# Patient Record
Sex: FEMALE | Race: WHITE | NOT HISPANIC OR LATINO
[De-identification: names, ages, dates, MRNs, and addresses within clinical notes are randomized per-mention and may not be internally consistent; named-entity substitution may affect disease eponyms.]

---

## 2017-05-02 ENCOUNTER — APPOINTMENT (OUTPATIENT)
Dept: VASCULAR SURGERY | Facility: CLINIC | Age: 72
End: 2017-05-02

## 2017-05-02 VITALS
TEMPERATURE: 97.6 F | DIASTOLIC BLOOD PRESSURE: 72 MMHG | HEART RATE: 73 BPM | HEIGHT: 58 IN | BODY MASS INDEX: 31.49 KG/M2 | WEIGHT: 150 LBS | SYSTOLIC BLOOD PRESSURE: 104 MMHG

## 2017-05-02 RX ORDER — ATORVASTATIN CALCIUM 80 MG/1
TABLET, FILM COATED ORAL
Refills: 0 | Status: ACTIVE | COMMUNITY

## 2017-05-02 RX ORDER — MONTELUKAST SODIUM 10 MG/1
10 TABLET, FILM COATED ORAL
Refills: 0 | Status: ACTIVE | COMMUNITY

## 2017-05-02 RX ORDER — MULTIVITAMIN
TABLET ORAL
Refills: 0 | Status: ACTIVE | COMMUNITY

## 2017-05-02 RX ORDER — FLUTICASONE PROPIONATE AND SALMETEROL 50; 100 UG/1; UG/1
100-50 POWDER RESPIRATORY (INHALATION)
Refills: 0 | Status: ACTIVE | COMMUNITY

## 2017-08-11 ENCOUNTER — APPOINTMENT (OUTPATIENT)
Dept: ORTHOPEDIC SURGERY | Facility: CLINIC | Age: 72
End: 2017-08-11
Payer: MEDICARE

## 2017-08-11 VITALS
HEART RATE: 58 BPM | BODY MASS INDEX: 36.44 KG/M2 | HEIGHT: 56 IN | DIASTOLIC BLOOD PRESSURE: 83 MMHG | SYSTOLIC BLOOD PRESSURE: 143 MMHG | WEIGHT: 162 LBS

## 2017-08-11 DIAGNOSIS — Z82.62 FAMILY HISTORY OF OSTEOPOROSIS: ICD-10-CM

## 2017-08-11 DIAGNOSIS — Z87.09 PERSONAL HISTORY OF OTHER DISEASES OF THE RESPIRATORY SYSTEM: ICD-10-CM

## 2017-08-11 DIAGNOSIS — Z86.39 PERSONAL HISTORY OF OTHER ENDOCRINE, NUTRITIONAL AND METABOLIC DISEASE: ICD-10-CM

## 2017-08-11 DIAGNOSIS — M17.0 BILATERAL PRIMARY OSTEOARTHRITIS OF KNEE: ICD-10-CM

## 2017-08-11 DIAGNOSIS — Z82.61 FAMILY HISTORY OF ARTHRITIS: ICD-10-CM

## 2017-08-11 PROCEDURE — 99203 OFFICE O/P NEW LOW 30 MIN: CPT

## 2017-08-11 PROCEDURE — 73562 X-RAY EXAM OF KNEE 3: CPT | Mod: LT

## 2017-08-11 PROCEDURE — 72170 X-RAY EXAM OF PELVIS: CPT | Mod: 59

## 2017-08-11 RX ORDER — ESOMEPRAZOLE MAGNESIUM 5 MG/1
GRANULE, DELAYED RELEASE ORAL
Refills: 0 | Status: DISCONTINUED | COMMUNITY
End: 2017-08-11

## 2017-08-11 RX ORDER — BUTALBITAL, ACETAMINOPHEN AND CAFFEINE 300; 50; 40 MG/1; MG/1; MG/1
50-300-40 CAPSULE ORAL
Refills: 0 | Status: ACTIVE | COMMUNITY

## 2017-08-15 ENCOUNTER — OUTPATIENT (OUTPATIENT)
Dept: OUTPATIENT SERVICES | Facility: HOSPITAL | Age: 72
LOS: 1 days | End: 2017-08-15
Payer: MEDICARE

## 2017-08-15 VITALS
OXYGEN SATURATION: 98 % | SYSTOLIC BLOOD PRESSURE: 140 MMHG | TEMPERATURE: 98 F | WEIGHT: 162.26 LBS | HEART RATE: 66 BPM | DIASTOLIC BLOOD PRESSURE: 82 MMHG | HEIGHT: 58 IN | RESPIRATION RATE: 16 BRPM

## 2017-08-15 DIAGNOSIS — Z90.12 ACQUIRED ABSENCE OF LEFT BREAST AND NIPPLE: Chronic | ICD-10-CM

## 2017-08-15 DIAGNOSIS — Z87.19 PERSONAL HISTORY OF OTHER DISEASES OF THE DIGESTIVE SYSTEM: Chronic | ICD-10-CM

## 2017-08-15 DIAGNOSIS — Z98.890 OTHER SPECIFIED POSTPROCEDURAL STATES: Chronic | ICD-10-CM

## 2017-08-15 DIAGNOSIS — M17.11 UNILATERAL PRIMARY OSTEOARTHRITIS, RIGHT KNEE: ICD-10-CM

## 2017-08-15 DIAGNOSIS — C50.912 MALIGNANT NEOPLASM OF UNSPECIFIED SITE OF LEFT FEMALE BREAST: ICD-10-CM

## 2017-08-15 DIAGNOSIS — Z01.818 ENCOUNTER FOR OTHER PREPROCEDURAL EXAMINATION: ICD-10-CM

## 2017-08-15 LAB
ALBUMIN SERPL ELPH-MCNC: 3.9 G/DL — SIGNIFICANT CHANGE UP (ref 3.3–5)
ALP SERPL-CCNC: 73 U/L — SIGNIFICANT CHANGE UP (ref 30–120)
ALT FLD-CCNC: 29 U/L DA — SIGNIFICANT CHANGE UP (ref 10–60)
ANION GAP SERPL CALC-SCNC: 10 MMOL/L — SIGNIFICANT CHANGE UP (ref 5–17)
APTT BLD: 27.6 SEC — SIGNIFICANT CHANGE UP (ref 27.5–37.4)
AST SERPL-CCNC: 28 U/L — SIGNIFICANT CHANGE UP (ref 10–40)
BILIRUB SERPL-MCNC: 0.4 MG/DL — SIGNIFICANT CHANGE UP (ref 0.2–1.2)
BLD GP AB SCN SERPL QL: SIGNIFICANT CHANGE UP
BUN SERPL-MCNC: 16 MG/DL — SIGNIFICANT CHANGE UP (ref 7–23)
CALCIUM SERPL-MCNC: 10.2 MG/DL — SIGNIFICANT CHANGE UP (ref 8.4–10.5)
CHLORIDE SERPL-SCNC: 110 MMOL/L — HIGH (ref 96–108)
CO2 SERPL-SCNC: 25 MMOL/L — SIGNIFICANT CHANGE UP (ref 22–31)
CREAT SERPL-MCNC: 0.84 MG/DL — SIGNIFICANT CHANGE UP (ref 0.5–1.3)
GLUCOSE SERPL-MCNC: 100 MG/DL — HIGH (ref 70–99)
HCT VFR BLD CALC: 45.4 % — HIGH (ref 34.5–45)
HGB BLD-MCNC: 14.7 G/DL — SIGNIFICANT CHANGE UP (ref 11.5–15.5)
INR BLD: 1.02 RATIO — SIGNIFICANT CHANGE UP (ref 0.88–1.16)
MCHC RBC-ENTMCNC: 30.6 PG — SIGNIFICANT CHANGE UP (ref 27–34)
MCHC RBC-ENTMCNC: 32.3 GM/DL — SIGNIFICANT CHANGE UP (ref 32–36)
MCV RBC AUTO: 94.8 FL — SIGNIFICANT CHANGE UP (ref 80–100)
MRSA PCR RESULT.: SIGNIFICANT CHANGE UP
PLATELET # BLD AUTO: 175 K/UL — SIGNIFICANT CHANGE UP (ref 150–400)
POTASSIUM SERPL-MCNC: 4.2 MMOL/L — SIGNIFICANT CHANGE UP (ref 3.5–5.3)
POTASSIUM SERPL-SCNC: 4.2 MMOL/L — SIGNIFICANT CHANGE UP (ref 3.5–5.3)
PROT SERPL-MCNC: 7.2 G/DL — SIGNIFICANT CHANGE UP (ref 6–8.3)
PROTHROM AB SERPL-ACNC: 11.1 SEC — SIGNIFICANT CHANGE UP (ref 9.8–12.7)
RBC # BLD: 4.8 M/UL — SIGNIFICANT CHANGE UP (ref 3.8–5.2)
RBC # FLD: 11.9 % — SIGNIFICANT CHANGE UP (ref 10.3–14.5)
S AUREUS DNA NOSE QL NAA+PROBE: SIGNIFICANT CHANGE UP
SODIUM SERPL-SCNC: 145 MMOL/L — SIGNIFICANT CHANGE UP (ref 135–145)
WBC # BLD: 6.1 K/UL — SIGNIFICANT CHANGE UP (ref 3.8–10.5)
WBC # FLD AUTO: 6.1 K/UL — SIGNIFICANT CHANGE UP (ref 3.8–10.5)

## 2017-08-15 PROCEDURE — 85730 THROMBOPLASTIN TIME PARTIAL: CPT

## 2017-08-15 PROCEDURE — 93010 ELECTROCARDIOGRAM REPORT: CPT | Mod: NC

## 2017-08-15 PROCEDURE — 80053 COMPREHEN METABOLIC PANEL: CPT

## 2017-08-15 PROCEDURE — 93005 ELECTROCARDIOGRAM TRACING: CPT

## 2017-08-15 PROCEDURE — 86850 RBC ANTIBODY SCREEN: CPT

## 2017-08-15 PROCEDURE — G0463: CPT

## 2017-08-15 PROCEDURE — 86901 BLOOD TYPING SEROLOGIC RH(D): CPT

## 2017-08-15 PROCEDURE — 87641 MR-STAPH DNA AMP PROBE: CPT

## 2017-08-15 PROCEDURE — 87640 STAPH A DNA AMP PROBE: CPT

## 2017-08-15 PROCEDURE — 86900 BLOOD TYPING SEROLOGIC ABO: CPT

## 2017-08-15 PROCEDURE — 85027 COMPLETE CBC AUTOMATED: CPT

## 2017-08-15 PROCEDURE — 85610 PROTHROMBIN TIME: CPT

## 2017-08-15 NOTE — H&P PST ADULT - PSH
H/O hiatal hernia  surgery approx 2002  S/P carpal tunnel release  right  S/P lumpectomy, left breast  approx 2008

## 2017-08-15 NOTE — H&P PST ADULT - PMH
Essential hypertension    Gastroesophageal reflux disease, esophagitis presence not specified    Hyperlipidemia, unspecified hyperlipidemia type    Malignant neoplasm of left female breast, unspecified estrogen receptor status, unspecified site of breast  2008- treated with lumpectomy, chemotherapy and radiation therapy.  Nonintractable migraine, unspecified migraine type    Obesity, Class I, BMI 30-34.9    Primary osteoarthritis of right knee    Uncomplicated asthma, unspecified asthma severity  mild-controlled w/ medication- has never been in ER or hospitalized for asthma. never had a severe exacerbation.

## 2017-08-15 NOTE — H&P PST ADULT - ASSESSMENT
71yo female patient scheduled for surgery on 8/23/17. She will obtain medical clearance from her PMD. She will be NPO as per Anesthesia and will take Pantoprazole, Atenolol, Topiramate (and if needed Butalbital) on AM of surgery with a sip of water. She will use Advair. All other pre-op instructions reviewed with patient.   Denies any metal allergies.

## 2017-08-15 NOTE — H&P PST ADULT - PROBLEM SELECTOR PROBLEM 2
Malignant neoplasm of left female breast, unspecified estrogen receptor status, unspecified site of breast

## 2017-08-15 NOTE — H&P PST ADULT - FAMILY HISTORY
Father  Still living? No  Family history of myocardial infarction at age less than 60, Age at diagnosis: Age Unknown     Sibling  Still living? No  Family history of acute myocardial infarction, Age at diagnosis: Age Unknown

## 2017-08-15 NOTE — H&P PST ADULT - HISTORY OF PRESENT ILLNESS
73yo female patient with approximately 3yr history of bilateral knee pain, worse on the right. She has had Cortisone injections with temporary relief, each time with diminishing improvement. She rates the pain at 5/10 when at rest, 7/10 when ambulatory, sometimes as high as 10/10. She is taking Meloxicam or Ibuprofen prn for pain with relief.   She was told that TKR is recommended and the plan is to do staged bilateral TKR. She presents today for PSTs.

## 2017-08-22 RX ORDER — DOCUSATE SODIUM 100 MG
100 CAPSULE ORAL THREE TIMES A DAY
Qty: 0 | Refills: 0 | Status: DISCONTINUED | OUTPATIENT
Start: 2017-08-23 | End: 2017-08-28

## 2017-08-22 RX ORDER — SODIUM CHLORIDE 9 MG/ML
1000 INJECTION, SOLUTION INTRAVENOUS
Qty: 0 | Refills: 0 | Status: DISCONTINUED | OUTPATIENT
Start: 2017-08-23 | End: 2017-08-24

## 2017-08-22 RX ORDER — ONDANSETRON 8 MG/1
4 TABLET, FILM COATED ORAL EVERY 6 HOURS
Qty: 0 | Refills: 0 | Status: DISCONTINUED | OUTPATIENT
Start: 2017-08-23 | End: 2017-08-28

## 2017-08-22 RX ORDER — POLYETHYLENE GLYCOL 3350 17 G/17G
17 POWDER, FOR SOLUTION ORAL DAILY
Qty: 0 | Refills: 0 | Status: DISCONTINUED | OUTPATIENT
Start: 2017-08-23 | End: 2017-08-28

## 2017-08-22 RX ORDER — PANTOPRAZOLE SODIUM 20 MG/1
40 TABLET, DELAYED RELEASE ORAL
Qty: 0 | Refills: 0 | Status: DISCONTINUED | OUTPATIENT
Start: 2017-08-23 | End: 2017-08-28

## 2017-08-22 RX ORDER — MAGNESIUM HYDROXIDE 400 MG/1
30 TABLET, CHEWABLE ORAL DAILY
Qty: 0 | Refills: 0 | Status: DISCONTINUED | OUTPATIENT
Start: 2017-08-23 | End: 2017-08-28

## 2017-08-22 RX ORDER — SENNA PLUS 8.6 MG/1
2 TABLET ORAL AT BEDTIME
Qty: 0 | Refills: 0 | Status: DISCONTINUED | OUTPATIENT
Start: 2017-08-23 | End: 2017-08-28

## 2017-08-23 ENCOUNTER — RESULT REVIEW (OUTPATIENT)
Age: 72
End: 2017-08-23

## 2017-08-23 ENCOUNTER — APPOINTMENT (OUTPATIENT)
Dept: ORTHOPEDIC SURGERY | Facility: HOSPITAL | Age: 72
End: 2017-08-23

## 2017-08-23 ENCOUNTER — INPATIENT (INPATIENT)
Facility: HOSPITAL | Age: 72
LOS: 8 days | Discharge: NURSING, REHAB OR LONG-TERM CARE | DRG: 462 | End: 2017-09-01
Attending: ORTHOPAEDIC SURGERY | Admitting: ORTHOPAEDIC SURGERY
Payer: MEDICARE

## 2017-08-23 VITALS
HEIGHT: 58 IN | DIASTOLIC BLOOD PRESSURE: 73 MMHG | SYSTOLIC BLOOD PRESSURE: 153 MMHG | WEIGHT: 160.06 LBS | RESPIRATION RATE: 17 BRPM | OXYGEN SATURATION: 100 % | TEMPERATURE: 98 F | HEART RATE: 60 BPM

## 2017-08-23 DIAGNOSIS — K21.9 GASTRO-ESOPHAGEAL REFLUX DISEASE WITHOUT ESOPHAGITIS: ICD-10-CM

## 2017-08-23 DIAGNOSIS — Z90.12 ACQUIRED ABSENCE OF LEFT BREAST AND NIPPLE: Chronic | ICD-10-CM

## 2017-08-23 DIAGNOSIS — Z98.890 OTHER SPECIFIED POSTPROCEDURAL STATES: Chronic | ICD-10-CM

## 2017-08-23 DIAGNOSIS — M17.11 UNILATERAL PRIMARY OSTEOARTHRITIS, RIGHT KNEE: ICD-10-CM

## 2017-08-23 DIAGNOSIS — Z87.19 PERSONAL HISTORY OF OTHER DISEASES OF THE DIGESTIVE SYSTEM: Chronic | ICD-10-CM

## 2017-08-23 DIAGNOSIS — Z01.818 ENCOUNTER FOR OTHER PREPROCEDURAL EXAMINATION: ICD-10-CM

## 2017-08-23 DIAGNOSIS — H10.9 UNSPECIFIED CONJUNCTIVITIS: ICD-10-CM

## 2017-08-23 DIAGNOSIS — J45.909 UNSPECIFIED ASTHMA, UNCOMPLICATED: ICD-10-CM

## 2017-08-23 DIAGNOSIS — E78.5 HYPERLIPIDEMIA, UNSPECIFIED: ICD-10-CM

## 2017-08-23 DIAGNOSIS — I10 ESSENTIAL (PRIMARY) HYPERTENSION: ICD-10-CM

## 2017-08-23 LAB
ANION GAP SERPL CALC-SCNC: 12 MMOL/L — SIGNIFICANT CHANGE UP (ref 5–17)
BUN SERPL-MCNC: 14 MG/DL — SIGNIFICANT CHANGE UP (ref 7–23)
CALCIUM SERPL-MCNC: 8.7 MG/DL — SIGNIFICANT CHANGE UP (ref 8.4–10.5)
CHLORIDE SERPL-SCNC: 109 MMOL/L — HIGH (ref 96–108)
CO2 SERPL-SCNC: 22 MMOL/L — SIGNIFICANT CHANGE UP (ref 22–31)
CREAT SERPL-MCNC: 0.76 MG/DL — SIGNIFICANT CHANGE UP (ref 0.5–1.3)
GLUCOSE SERPL-MCNC: 212 MG/DL — HIGH (ref 70–99)
HCT VFR BLD CALC: 38 % — SIGNIFICANT CHANGE UP (ref 34.5–45)
HGB BLD-MCNC: 12.3 G/DL — SIGNIFICANT CHANGE UP (ref 11.5–15.5)
MCHC RBC-ENTMCNC: 30.7 PG — SIGNIFICANT CHANGE UP (ref 27–34)
MCHC RBC-ENTMCNC: 32.4 GM/DL — SIGNIFICANT CHANGE UP (ref 32–36)
MCV RBC AUTO: 94.8 FL — SIGNIFICANT CHANGE UP (ref 80–100)
PLATELET # BLD AUTO: 153 K/UL — SIGNIFICANT CHANGE UP (ref 150–400)
POTASSIUM SERPL-MCNC: 3.8 MMOL/L — SIGNIFICANT CHANGE UP (ref 3.5–5.3)
POTASSIUM SERPL-SCNC: 3.8 MMOL/L — SIGNIFICANT CHANGE UP (ref 3.5–5.3)
RBC # BLD: 4.01 M/UL — SIGNIFICANT CHANGE UP (ref 3.8–5.2)
RBC # FLD: 12.6 % — SIGNIFICANT CHANGE UP (ref 10.3–14.5)
SODIUM SERPL-SCNC: 143 MMOL/L — SIGNIFICANT CHANGE UP (ref 135–145)
WBC # BLD: 10.6 K/UL — HIGH (ref 3.8–10.5)
WBC # FLD AUTO: 10.6 K/UL — HIGH (ref 3.8–10.5)

## 2017-08-23 PROCEDURE — 99222 1ST HOSP IP/OBS MODERATE 55: CPT

## 2017-08-23 PROCEDURE — 88305 TISSUE EXAM BY PATHOLOGIST: CPT | Mod: 26

## 2017-08-23 PROCEDURE — 73562 X-RAY EXAM OF KNEE 3: CPT | Mod: 26,RT

## 2017-08-23 PROCEDURE — 27447 TOTAL KNEE ARTHROPLASTY: CPT | Mod: RT

## 2017-08-23 PROCEDURE — 88311 DECALCIFY TISSUE: CPT | Mod: 26

## 2017-08-23 PROCEDURE — 27447 TOTAL KNEE ARTHROPLASTY: CPT | Mod: 82,RT

## 2017-08-23 RX ORDER — ACETAMINOPHEN 500 MG
1000 TABLET ORAL EVERY 6 HOURS
Qty: 0 | Refills: 0 | Status: COMPLETED | OUTPATIENT
Start: 2017-08-23 | End: 2017-08-24

## 2017-08-23 RX ORDER — ENOXAPARIN SODIUM 100 MG/ML
30 INJECTION SUBCUTANEOUS EVERY 12 HOURS
Qty: 0 | Refills: 0 | Status: COMPLETED | OUTPATIENT
Start: 2017-08-24 | End: 2017-08-27

## 2017-08-23 RX ORDER — APREPITANT 80 MG/1
40 CAPSULE ORAL ONCE
Qty: 0 | Refills: 0 | Status: COMPLETED | OUTPATIENT
Start: 2017-08-23 | End: 2017-08-23

## 2017-08-23 RX ORDER — BUDESONIDE AND FORMOTEROL FUMARATE DIHYDRATE 160; 4.5 UG/1; UG/1
2 AEROSOL RESPIRATORY (INHALATION)
Qty: 0 | Refills: 0 | Status: DISCONTINUED | OUTPATIENT
Start: 2017-08-23 | End: 2017-08-28

## 2017-08-23 RX ORDER — SODIUM CHLORIDE 9 MG/ML
1000 INJECTION, SOLUTION INTRAVENOUS
Qty: 0 | Refills: 0 | Status: DISCONTINUED | OUTPATIENT
Start: 2017-08-23 | End: 2017-08-23

## 2017-08-23 RX ORDER — OXYCODONE HYDROCHLORIDE 5 MG/1
5 TABLET ORAL
Qty: 0 | Refills: 0 | Status: DISCONTINUED | OUTPATIENT
Start: 2017-08-23 | End: 2017-08-24

## 2017-08-23 RX ORDER — MONTELUKAST 4 MG/1
10 TABLET, CHEWABLE ORAL DAILY
Qty: 0 | Refills: 0 | Status: DISCONTINUED | OUTPATIENT
Start: 2017-08-23 | End: 2017-08-28

## 2017-08-23 RX ORDER — ATENOLOL 25 MG/1
50 TABLET ORAL
Qty: 0 | Refills: 0 | Status: DISCONTINUED | OUTPATIENT
Start: 2017-08-23 | End: 2017-08-28

## 2017-08-23 RX ORDER — HYDROMORPHONE HYDROCHLORIDE 2 MG/ML
0.5 INJECTION INTRAMUSCULAR; INTRAVENOUS; SUBCUTANEOUS
Qty: 0 | Refills: 0 | Status: DISCONTINUED | OUTPATIENT
Start: 2017-08-23 | End: 2017-08-23

## 2017-08-23 RX ORDER — ACETAMINOPHEN 500 MG
1000 TABLET ORAL EVERY 8 HOURS
Qty: 0 | Refills: 0 | Status: DISCONTINUED | OUTPATIENT
Start: 2017-08-24 | End: 2017-08-28

## 2017-08-23 RX ORDER — ERYTHROMYCIN BASE 5 MG/GRAM
1 OINTMENT (GRAM) OPHTHALMIC (EYE)
Qty: 0 | Refills: 0 | Status: DISCONTINUED | OUTPATIENT
Start: 2017-08-23 | End: 2017-08-24

## 2017-08-23 RX ORDER — ALBUTEROL 90 UG/1
2 AEROSOL, METERED ORAL EVERY 6 HOURS
Qty: 0 | Refills: 0 | Status: DISCONTINUED | OUTPATIENT
Start: 2017-08-23 | End: 2017-09-01

## 2017-08-23 RX ORDER — CEFAZOLIN SODIUM 1 G
2000 VIAL (EA) INJECTION EVERY 8 HOURS
Qty: 0 | Refills: 0 | Status: COMPLETED | OUTPATIENT
Start: 2017-08-23 | End: 2017-08-24

## 2017-08-23 RX ORDER — HYDROMORPHONE HYDROCHLORIDE 2 MG/ML
0.5 INJECTION INTRAMUSCULAR; INTRAVENOUS; SUBCUTANEOUS
Qty: 0 | Refills: 0 | Status: DISCONTINUED | OUTPATIENT
Start: 2017-08-23 | End: 2017-08-28

## 2017-08-23 RX ORDER — ACETAMINOPHEN 500 MG
1000 TABLET ORAL ONCE
Qty: 0 | Refills: 0 | Status: COMPLETED | OUTPATIENT
Start: 2017-08-23 | End: 2017-08-23

## 2017-08-23 RX ORDER — OXYCODONE HYDROCHLORIDE 5 MG/1
10 TABLET ORAL
Qty: 0 | Refills: 0 | Status: DISCONTINUED | OUTPATIENT
Start: 2017-08-23 | End: 2017-08-24

## 2017-08-23 RX ORDER — TOPIRAMATE 25 MG
50 TABLET ORAL
Qty: 0 | Refills: 0 | Status: DISCONTINUED | OUTPATIENT
Start: 2017-08-23 | End: 2017-08-28

## 2017-08-23 RX ORDER — ONDANSETRON 8 MG/1
4 TABLET, FILM COATED ORAL ONCE
Qty: 0 | Refills: 0 | Status: DISCONTINUED | OUTPATIENT
Start: 2017-08-23 | End: 2017-08-23

## 2017-08-23 RX ORDER — CELECOXIB 200 MG/1
200 CAPSULE ORAL
Qty: 0 | Refills: 0 | Status: DISCONTINUED | OUTPATIENT
Start: 2017-08-23 | End: 2017-08-28

## 2017-08-23 RX ORDER — TRANEXAMIC ACID 100 MG/ML
1000 INJECTION, SOLUTION INTRAVENOUS ONCE
Qty: 0 | Refills: 0 | Status: COMPLETED | OUTPATIENT
Start: 2017-08-23 | End: 2017-08-23

## 2017-08-23 RX ORDER — ATORVASTATIN CALCIUM 80 MG/1
10 TABLET, FILM COATED ORAL AT BEDTIME
Qty: 0 | Refills: 0 | Status: DISCONTINUED | OUTPATIENT
Start: 2017-08-23 | End: 2017-08-28

## 2017-08-23 RX ORDER — CELECOXIB 200 MG/1
200 CAPSULE ORAL ONCE
Qty: 0 | Refills: 0 | Status: COMPLETED | OUTPATIENT
Start: 2017-08-23 | End: 2017-08-23

## 2017-08-23 RX ORDER — CEFAZOLIN SODIUM 1 G
2000 VIAL (EA) INJECTION ONCE
Qty: 0 | Refills: 0 | Status: COMPLETED | OUTPATIENT
Start: 2017-08-23 | End: 2017-08-23

## 2017-08-23 RX ADMIN — CELECOXIB 200 MILLIGRAM(S): 200 CAPSULE ORAL at 17:58

## 2017-08-23 RX ADMIN — Medication 1000 MILLIGRAM(S): at 21:21

## 2017-08-23 RX ADMIN — CELECOXIB 200 MILLIGRAM(S): 200 CAPSULE ORAL at 07:28

## 2017-08-23 RX ADMIN — Medication 100 MILLIGRAM(S): at 21:19

## 2017-08-23 RX ADMIN — Medication 100 MILLIGRAM(S): at 17:10

## 2017-08-23 RX ADMIN — APREPITANT 40 MILLIGRAM(S): 80 CAPSULE ORAL at 07:28

## 2017-08-23 RX ADMIN — SODIUM CHLORIDE 75 MILLILITER(S): 9 INJECTION, SOLUTION INTRAVENOUS at 18:45

## 2017-08-23 RX ADMIN — Medication 50 MILLIGRAM(S): at 17:58

## 2017-08-23 RX ADMIN — Medication 400 MILLIGRAM(S): at 21:18

## 2017-08-23 RX ADMIN — HYDROMORPHONE HYDROCHLORIDE 0.5 MILLIGRAM(S): 2 INJECTION INTRAMUSCULAR; INTRAVENOUS; SUBCUTANEOUS at 16:47

## 2017-08-23 RX ADMIN — ATORVASTATIN CALCIUM 10 MILLIGRAM(S): 80 TABLET, FILM COATED ORAL at 21:18

## 2017-08-23 RX ADMIN — HYDROMORPHONE HYDROCHLORIDE 0.5 MILLIGRAM(S): 2 INJECTION INTRAMUSCULAR; INTRAVENOUS; SUBCUTANEOUS at 16:17

## 2017-08-23 RX ADMIN — SENNA PLUS 2 TABLET(S): 8.6 TABLET ORAL at 18:07

## 2017-08-23 RX ADMIN — SODIUM CHLORIDE 75 MILLILITER(S): 9 INJECTION, SOLUTION INTRAVENOUS at 07:29

## 2017-08-23 RX ADMIN — SODIUM CHLORIDE 100 MILLILITER(S): 9 INJECTION, SOLUTION INTRAVENOUS at 12:07

## 2017-08-23 RX ADMIN — ATENOLOL 50 MILLIGRAM(S): 25 TABLET ORAL at 17:58

## 2017-08-23 RX ADMIN — Medication 1000 MILLIGRAM(S): at 16:42

## 2017-08-23 RX ADMIN — Medication 400 MILLIGRAM(S): at 16:42

## 2017-08-23 RX ADMIN — MONTELUKAST 10 MILLIGRAM(S): 4 TABLET, CHEWABLE ORAL at 21:23

## 2017-08-23 NOTE — PHYSICAL THERAPY INITIAL EVALUATION ADULT - GAIT DEVIATIONS NOTED, PT EVAL
decreased velocity of limb motion/decreased step length/decreased janneth/decreased weight-shifting ability

## 2017-08-23 NOTE — CONSULT NOTE ADULT - SUBJECTIVE AND OBJECTIVE BOX
Patient is 73 yo female with hx of HTN, and GERD presenting with S/P right Total knee replacement.  Pain well controlled.  + right eye redness with some clear discharge no crusting. + blurry vision.  no decrease in vision.  Offers no other complaints      Constitutional: No fever, fatigue or weight loss.  Skin: No rash.  Eyes: No recent vision problems or eye pain.  ENT: No congestion, ear pain, or sore throat.  Endocrine: No thyroid problems.  Cardiovascular: No chest pain or palpation.  Respiratory: No cough, shortness of breath, congestion, or wheezing.  Gastrointestinal: No abdominal pain, nausea, vomiting, or diarrhea.  Genitourinary: No dysuria.  Musculoskeletal: No joint swelling.  Neurologic: No headache.      Allergies:  No Known Allergies:        Intolerances:  	Demerol HCl: Drug, Nausea, Vomiting    Past Medical History:  Essential hypertension    Gastroesophageal reflux disease, esophagitis presence not specified    Hyperlipidemia, unspecified hyperlipidemia type    Malignant neoplasm of left female breast, unspecified estrogen receptor status, unspecified site of breast  2008- treated with lumpectomy, chemotherapy and radiation therapy.  Nonintractable migraine, unspecified migraine type    Obesity, Class I, BMI 30-34.9    Primary osteoarthritis of right knee    Uncomplicated asthma, unspecified asthma severity  mild-controlled w/ medication- has never been in ER or hospitalized for asthma. never had a severe exacerbation..    Past Surgical History:  H/O hiatal hernia  surgery approx 2002  S/P carpal tunnel release  right  S/P lumpectomy, left breast  approx 2008.        Home Medications:   * Patient Currently Takes Medications as of 23-Aug-2017 07:17 documented in Prescription Writer  · 	pantoprazole 40 mg oral delayed release tablet: Last Dose Taken: 23-Aug-2017 4:00 AM, 1 tab(s) orally once a day  · 	atenolol 50 mg oral tablet: Last Dose Taken: 23-Aug-2017 4:00 AM, 1 tab(s) orally 2 times a day  · 	Singulair 10 mg oral tablet: Last Dose Taken: 22-Aug-2017 , 1 tab(s) orally once a day  · 	Lipitor 40 mg oral tablet: Last Dose Taken: 22-Aug-2017 , 1 tab(s) orally once a day  · 	topiramate 50 mg oral tablet: Last Dose Taken: 23-Aug-2017 4:00 AM, 1 tab(s) orally 2 times a day  · 	meloxicam 7.5 mg oral tablet: Last Dose Taken: 16-Aug-2017 , 1 tab(s) orally once a day  · 	ibuprofen 800 mg oral tablet: Last Dose Taken: 09-Aug-2017 , 1 tab(s) orally 3 times a day, As Needed  · 	Advair Diskus 250 mcg-50 mcg inhalation powder: Last Dose Taken: 23-Aug-2017 4:00 AM, 1 puff(s) inhaled 2 times a day  · 	aspirin 81 mg oral tablet: Last Dose Taken: 16-Aug-2017 , 1 tab(s) orally once a day  · 	Centrum Silver Women's: Last Dose Taken: 16-Aug-2017 , 1 tab(s) orally once a day  · 	Super B Complex oral tablet: Last Dose Taken: 16-Aug-2017 , 1 tab(s) orally once a day  · 	biotin: Last Dose Taken: 16-Aug-2017 , 1 cap(s) orally once a day  · 	Caltrate 600 + D oral tablet: Last Dose Taken: 16-Aug-2017 , 1 tab(s) orally once a day  · 	Colace 100 mg oral capsule: Last Dose Taken: 22-Aug-2017 , 1 cap(s) orally 2 times a day  · 	Claritin 10 mg oral tablet: Last Dose Taken: 23-Aug-2017 4:00 AM, 1 tab(s) orally once a day, As Needed  · 	butalbital: Last Dose Taken: 20-Aug-2017 , 1 tab(s) orally once a day, As Needed        SHX denies ny ETOH/Tob/Illicit drug usage            Vital Signs Last 24 Hrs  T(C): 36.5 (23 Aug 2017 11:06), Max: 36.7 (23 Aug 2017 07:02)  T(F): 97.7 (23 Aug 2017 11:06), Max: 98 (23 Aug 2017 07:02)  HR: 59 (23 Aug 2017 11:45) (56 - 65)  BP: 133/71 (23 Aug 2017 11:45) (111/71 - 153/73)  BP(mean): --  RR: 13 (23 Aug 2017 11:45) (12 - 24)  SpO2: 100% (23 Aug 2017 11:45) (100% - 100%)      PHYSICAL EXAM-  GENERAL: NAD, well-groomed, well-developed  HEAD:  Atraumatic, Normocephalic  EYES: EOMI, PERRLA, Right subconjunctival hemorrhage with injection.  Clear Discharge.    NECK: Supple, No JVD, Normal thyroid  NERVOUS SYSTEM:  Alert & Oriented X3, Motor Strength 5/5 B/L upper and lower extremities; DTRs 2+ intact and symmetric  CHEST/LUNG: Clear to percussion bilaterally; No rales, rhonchi, wheezing, or rubs  HEART: Regular rate and rhythm; No murmurs, rubs, or gallops  ABDOMEN: Soft, Nontender, Nondistended; Bowel sounds present  EXTREMITIES:  2+ Peripheral Pulses, No clubbing, cyanosis, or edema  SKIN: No rashes or lesions

## 2017-08-23 NOTE — PROVIDER CONTACT NOTE (OTHER) - SITUATION
right eye redness started yesterday at home,Rx with visine drops at home,no pain or blurry vision ysterday

## 2017-08-23 NOTE — CONSULT NOTE ADULT - SUBJECTIVE AND OBJECTIVE BOX
[facility] Ophthalmology Consult Note    HPI:  73 yo F POD0 s/p knee replacement OD, has been on baby aspirin for a long time, woke up yesterday morning with red eye, no pain, blurry vision or other ocular or visual complaints. No straining, does not remember rubbing eye.    PMH: HTN, GERD, HLD, breast cancer in remission, OA  Meds:   Patient Currently Takes Medications as of 23-Aug-2017 07:17 documented in Prescription Writer  · 	pantoprazole 40 mg oral delayed release tablet: Last Dose Taken: 23-Aug-2017 4:00 AM, 1 tab(s) orally once a day  · 	atenolol 50 mg oral tablet: Last Dose Taken: 23-Aug-2017 4:00 AM, 1 tab(s) orally 2 times a day  · 	Singulair 10 mg oral tablet: Last Dose Taken: 22-Aug-2017 , 1 tab(s) orally once a day  · 	Lipitor 40 mg oral tablet: Last Dose Taken: 22-Aug-2017 , 1 tab(s) orally once a day  · 	topiramate 50 mg oral tablet: Last Dose Taken: 23-Aug-2017 4:00 AM, 1 tab(s) orally 2 times a day  · 	meloxicam 7.5 mg oral tablet: Last Dose Taken: 16-Aug-2017 , 1 tab(s) orally once a day  · 	ibuprofen 800 mg oral tablet: Last Dose Taken: 09-Aug-2017 , 1 tab(s) orally 3 times a day, As Needed  · 	Advair Diskus 250 mcg-50 mcg inhalation powder: Last Dose Taken: 23-Aug-2017 4:00 AM, 1 puff(s) inhaled 2 times a day  · 	aspirin 81 mg oral tablet: Last Dose Taken: 16-Aug-2017 , 1 tab(s) orally once a day  · 	Centrum Silver Women's: Last Dose Taken: 16-Aug-2017 , 1 tab(s) orally once a day  · 	Super B Complex oral tablet: Last Dose Taken: 16-Aug-2017 , 1 tab(s) orally once a day  · 	biotin: Last Dose Taken: 16-Aug-2017 , 1 cap(s) orally once a day  · 	Caltrate 600 + D oral tablet: Last Dose Taken: 16-Aug-2017 , 1 tab(s) orally once a day  · 	Colace 100 mg oral capsule: Last Dose Taken: 22-Aug-2017 , 1 cap(s) orally 2 times a day  · 	Claritin 10 mg oral tablet: Last Dose Taken: 23-Aug-2017 4:00 AM, 1 tab(s) orally once a day, As Needed  · 	butalbital: Last Dose Taken: 20-Aug-2017 , 1 tab(s) orally once a day, As Needed  POcHx (including surgeries/lasers/trauma):  None  Drops: None  FamHx: None  Social Hx: None  Allergies: NKDA    ROS:  General (neg), Vision (per HPI), Head and Neck (neg), Pulm (neg), CV (neg), GI (neg),  (neg), Musculoskeletal (neg), Skin/Integ (neg), Neuro (neg), Endocrine (neg), Heme (neg), All/Immuno (neg)    Mood and Affect Appropriate ( x ),  Oriented to Time, Place, and Person x 3 ( x )    Ophthalmology Exam    Visual acuity (sc): 20/20 OU  Pupils: PERRL OU, no APD  Ttono: 16 OU  Extraocular movements (EOMs): Full OU, no pain, no diplopia   Confrontational Visual Field (CVF):  Full OU  Color Plates: 12/12 OU    Pen Light Exam (PLE)  External:  Flat OU  Lids/Lashes/Lacrimal Ducts: Flat OU    Sclera/Conjunctiva:  OD scattered subconjunctival hemorrhage, no injection. W+Q OS  Cornea: Cl OU  Anterior Chamber: D+F OU  Iris:  Flat OU  Lens:  NS OU    Fundus Exam: dilated with 1% tropicamide and 2.5% phenylephrine  Approval obtained from primary team for dilation  Patient aware that pupils can remained dilated for at least 4-6 hours  Exam performed with 20D lens    Vitreous: wnl OU  Disc, cup/disc: sharp and pink, 0.4 OU  Macula:  wnl OU  Vessels:  wnl OU  Periphery: wnl OU

## 2017-08-23 NOTE — CONSULT NOTE ADULT - ATTENDING COMMENTS
I have reviewed the residents note including the history, exam, assessment, and plan.  I agree with the residents assessment and plan.    Deepika Dominguez MD
Plan of care was discussed with patient in great details, All questions were answered to their satisfaction.  Seems to understand, and in agreement

## 2017-08-23 NOTE — CONSULT NOTE ADULT - PROBLEM SELECTOR RECOMMENDATION 9
S/P right Total knee replacement.  Continue with pain management, DVT proph, and wound care as per Ortho.  PT/OT

## 2017-08-23 NOTE — CONSULT NOTE ADULT - ASSESSMENT
Assessment: Subconjunctival hemorrhage OD. Otherwise normal eye exam.    Plan: No ophthalmologic intervention      Follow-Up:  Patient should follow up his/her ophthalmologist or in the [facility] Op[facility] Practice within 1 week of discharge (or within 1 week of discharge from rehab).  [address] [place] [address].  [place] [address] [phone]    D/W Dr Dominguez (attending) Assessment: Subconjunctival hemorrhage OD, likely from baby ASA.  First episode. Otherwise normal eye exam.    Plan: No ophthalmologic intervention  AT PRN      Follow-Up:  Patient should follow up his/her ophthalmologist or in the [facility] Ophthalmology Practice within 1 week of discharge (or within 1 week of discharge from rehab).  [address] [place] [address].  [place] [address] [phone]    D/W Dr Dominguez (attending)

## 2017-08-23 NOTE — PROGRESS NOTE ADULT - SUBJECTIVE AND OBJECTIVE BOX
Orthopaedic Post Op Note    Procedure: Right  TKR  Surgeon: Michi Samuel MD    72y Female comfortable, without complaints. Reported pain score = 2  Denies N/V, CP, SOB, numbness/tingling of extremities.    PE:  Vital Signs Last 24 Hrs  T(C): 36.5 (23 Aug 2017 11:06), Max: 36.7 (23 Aug 2017 07:02)  T(F): 97.7 (23 Aug 2017 11:06), Max: 98 (23 Aug 2017 07:02)  HR: 66 (23 Aug 2017 15:35) (56 - 67)  BP: 141/73 (23 Aug 2017 15:35) (111/71 - 153/73)    RR: 20 (23 Aug 2017 15:35) (12 - 24)  SpO2: 100% (23 Aug 2017 15:35) (98% - 100%)  General: Pt alert and oriented   Lungs: + BS CTA bilaterally  Heart: +S1 & S2 heard, RRR  Abd: + BS heard, soft, NT, ND  Right  Knee Dressing: C/D/I   Bilateral LEs:  Motor:   5/5 dorsiflexion, plantarflexion, EHL  Sensation intact to LT   + DP Pulses      A/P: 72y Female POD#0 s/p Right TKR  - Stable  - Acetaminophen, Celebrex, Dilaudid/Oxycodone for Pain Control   - DVT ppx: Lovenox  - Julianne op IV abx: Ancef  - PT, OT per protocol  - F/U AM Labs  Patient is staged for Left TKR on 8/28/17

## 2017-08-23 NOTE — PHYSICAL THERAPY INITIAL EVALUATION ADULT - RANGE OF MOTION EXAMINATION, REHAB EVAL
Right knee 0-50 deg/bilateral upper extremity ROM was WFL (within functional limits)/deficits as listed below

## 2017-08-24 ENCOUNTER — TRANSCRIPTION ENCOUNTER (OUTPATIENT)
Age: 72
End: 2017-08-24

## 2017-08-24 LAB
ANION GAP SERPL CALC-SCNC: 8 MMOL/L — SIGNIFICANT CHANGE UP (ref 5–17)
BUN SERPL-MCNC: 12 MG/DL — SIGNIFICANT CHANGE UP (ref 7–23)
CALCIUM SERPL-MCNC: 9.1 MG/DL — SIGNIFICANT CHANGE UP (ref 8.4–10.5)
CHLORIDE SERPL-SCNC: 111 MMOL/L — HIGH (ref 96–108)
CO2 SERPL-SCNC: 26 MMOL/L — SIGNIFICANT CHANGE UP (ref 22–31)
CREAT SERPL-MCNC: 0.65 MG/DL — SIGNIFICANT CHANGE UP (ref 0.5–1.3)
GLUCOSE SERPL-MCNC: 110 MG/DL — HIGH (ref 70–99)
HCT VFR BLD CALC: 35.6 % — SIGNIFICANT CHANGE UP (ref 34.5–45)
HGB BLD-MCNC: 11.9 G/DL — SIGNIFICANT CHANGE UP (ref 11.5–15.5)
MCHC RBC-ENTMCNC: 31.7 PG — SIGNIFICANT CHANGE UP (ref 27–34)
MCHC RBC-ENTMCNC: 33.4 GM/DL — SIGNIFICANT CHANGE UP (ref 32–36)
MCV RBC AUTO: 94.9 FL — SIGNIFICANT CHANGE UP (ref 80–100)
PLATELET # BLD AUTO: 147 K/UL — LOW (ref 150–400)
POTASSIUM SERPL-MCNC: 4.1 MMOL/L — SIGNIFICANT CHANGE UP (ref 3.5–5.3)
POTASSIUM SERPL-SCNC: 4.1 MMOL/L — SIGNIFICANT CHANGE UP (ref 3.5–5.3)
RBC # BLD: 3.76 M/UL — LOW (ref 3.8–5.2)
RBC # FLD: 12.2 % — SIGNIFICANT CHANGE UP (ref 10.3–14.5)
SODIUM SERPL-SCNC: 145 MMOL/L — SIGNIFICANT CHANGE UP (ref 135–145)
WBC # BLD: 7.5 K/UL — SIGNIFICANT CHANGE UP (ref 3.8–10.5)
WBC # FLD AUTO: 7.5 K/UL — SIGNIFICANT CHANGE UP (ref 3.8–10.5)

## 2017-08-24 PROCEDURE — 99232 SBSQ HOSP IP/OBS MODERATE 35: CPT

## 2017-08-24 RX ORDER — TRAMADOL HYDROCHLORIDE 50 MG/1
100 TABLET ORAL EVERY 6 HOURS
Qty: 0 | Refills: 0 | Status: DISCONTINUED | OUTPATIENT
Start: 2017-08-24 | End: 2017-08-24

## 2017-08-24 RX ORDER — TRAMADOL HYDROCHLORIDE 50 MG/1
25 TABLET ORAL EVERY 4 HOURS
Qty: 0 | Refills: 0 | Status: DISCONTINUED | OUTPATIENT
Start: 2017-08-24 | End: 2017-08-24

## 2017-08-24 RX ORDER — TRAMADOL HYDROCHLORIDE 50 MG/1
100 TABLET ORAL EVERY 6 HOURS
Qty: 0 | Refills: 0 | Status: DISCONTINUED | OUTPATIENT
Start: 2017-08-24 | End: 2017-08-25

## 2017-08-24 RX ORDER — TRAMADOL HYDROCHLORIDE 50 MG/1
50 TABLET ORAL EVERY 4 HOURS
Qty: 0 | Refills: 0 | Status: DISCONTINUED | OUTPATIENT
Start: 2017-08-24 | End: 2017-08-24

## 2017-08-24 RX ORDER — TRAMADOL HYDROCHLORIDE 50 MG/1
50 TABLET ORAL EVERY 4 HOURS
Qty: 0 | Refills: 0 | Status: DISCONTINUED | OUTPATIENT
Start: 2017-08-24 | End: 2017-08-25

## 2017-08-24 RX ORDER — FLUTICASONE PROPIONATE 50 MCG
2 SPRAY, SUSPENSION NASAL DAILY
Qty: 0 | Refills: 0 | Status: DISCONTINUED | OUTPATIENT
Start: 2017-08-24 | End: 2017-09-01

## 2017-08-24 RX ORDER — LORATADINE 10 MG/1
10 TABLET ORAL DAILY
Qty: 0 | Refills: 0 | Status: DISCONTINUED | OUTPATIENT
Start: 2017-08-24 | End: 2017-08-28

## 2017-08-24 RX ADMIN — ATENOLOL 50 MILLIGRAM(S): 25 TABLET ORAL at 05:13

## 2017-08-24 RX ADMIN — CELECOXIB 200 MILLIGRAM(S): 200 CAPSULE ORAL at 09:00

## 2017-08-24 RX ADMIN — TRAMADOL HYDROCHLORIDE 50 MILLIGRAM(S): 50 TABLET ORAL at 09:25

## 2017-08-24 RX ADMIN — Medication 1000 MILLIGRAM(S): at 10:29

## 2017-08-24 RX ADMIN — Medication 1000 MILLIGRAM(S): at 19:04

## 2017-08-24 RX ADMIN — TRAMADOL HYDROCHLORIDE 50 MILLIGRAM(S): 50 TABLET ORAL at 13:34

## 2017-08-24 RX ADMIN — Medication 50 MILLIGRAM(S): at 05:13

## 2017-08-24 RX ADMIN — TRAMADOL HYDROCHLORIDE 100 MILLIGRAM(S): 50 TABLET ORAL at 20:12

## 2017-08-24 RX ADMIN — ATENOLOL 50 MILLIGRAM(S): 25 TABLET ORAL at 17:31

## 2017-08-24 RX ADMIN — PANTOPRAZOLE SODIUM 40 MILLIGRAM(S): 20 TABLET, DELAYED RELEASE ORAL at 05:13

## 2017-08-24 RX ADMIN — TRAMADOL HYDROCHLORIDE 50 MILLIGRAM(S): 50 TABLET ORAL at 14:00

## 2017-08-24 RX ADMIN — Medication 1000 MILLIGRAM(S): at 09:53

## 2017-08-24 RX ADMIN — Medication 400 MILLIGRAM(S): at 03:05

## 2017-08-24 RX ADMIN — ATORVASTATIN CALCIUM 10 MILLIGRAM(S): 80 TABLET, FILM COATED ORAL at 19:21

## 2017-08-24 RX ADMIN — Medication 2 SPRAY(S): at 08:30

## 2017-08-24 RX ADMIN — ENOXAPARIN SODIUM 30 MILLIGRAM(S): 100 INJECTION SUBCUTANEOUS at 21:43

## 2017-08-24 RX ADMIN — ENOXAPARIN SODIUM 30 MILLIGRAM(S): 100 INJECTION SUBCUTANEOUS at 08:43

## 2017-08-24 RX ADMIN — CELECOXIB 200 MILLIGRAM(S): 200 CAPSULE ORAL at 19:04

## 2017-08-24 RX ADMIN — Medication 100 MILLIGRAM(S): at 00:06

## 2017-08-24 RX ADMIN — CELECOXIB 200 MILLIGRAM(S): 200 CAPSULE ORAL at 08:26

## 2017-08-24 RX ADMIN — Medication 1000 MILLIGRAM(S): at 03:08

## 2017-08-24 RX ADMIN — MONTELUKAST 10 MILLIGRAM(S): 4 TABLET, CHEWABLE ORAL at 12:21

## 2017-08-24 RX ADMIN — Medication 1000 MILLIGRAM(S): at 17:31

## 2017-08-24 RX ADMIN — BUDESONIDE AND FORMOTEROL FUMARATE DIHYDRATE 2 PUFF(S): 160; 4.5 AEROSOL RESPIRATORY (INHALATION) at 05:15

## 2017-08-24 RX ADMIN — Medication 50 MILLIGRAM(S): at 17:31

## 2017-08-24 RX ADMIN — TRAMADOL HYDROCHLORIDE 50 MILLIGRAM(S): 50 TABLET ORAL at 08:40

## 2017-08-24 RX ADMIN — TRAMADOL HYDROCHLORIDE 100 MILLIGRAM(S): 50 TABLET ORAL at 19:21

## 2017-08-24 RX ADMIN — CELECOXIB 200 MILLIGRAM(S): 200 CAPSULE ORAL at 17:31

## 2017-08-24 RX ADMIN — Medication 100 MILLIGRAM(S): at 05:13

## 2017-08-24 NOTE — PROGRESS NOTE ADULT - SUBJECTIVE AND OBJECTIVE BOX
pain well controlled.  Offers no other complaints                 Constitutional: No fever, fatigue or weight loss.  Skin: No rash.  Eyes: No recent vision problems or eye pain.  ENT: No congestion, ear pain, or sore throat.  Endocrine: No thyroid problems.  Cardiovascular: No chest pain or palpation.  Respiratory: No cough, shortness of breath, congestion, or wheezing.  Gastrointestinal: No abdominal pain, nausea, vomiting, or diarrhea.  Genitourinary: No dysuria.  Musculoskeletal: No joint swelling.  Neurologic: No headache.      Vital Signs Last 24 Hrs  T(C): 36.7 (08-24-17 @ 07:30), Max: 36.7 (08-24-17 @ 07:30)  T(F): 98 (08-24-17 @ 07:30), Max: 98 (08-24-17 @ 07:30)  HR: 56 (08-24-17 @ 07:30) (56 - 67)  BP: 127/66 (08-24-17 @ 07:30) (110/67 - 146/60)  BP(mean): --  RR: 16 (08-24-17 @ 07:30) (12 - 24)  SpO2: 97% (08-24-17 @ 07:30) (95% - 100%)        PHYSICAL EXAM-  GENERAL: NAD, well-groomed, well-developed  HEAD:  Atraumatic, Normocephalic  EYES: EOMI, PERRLA, conjunctiva and sclera clear  NECK: Supple, No JVD, Normal thyroid  NERVOUS SYSTEM:  Alert & Oriented X3, Motor Strength 5/5 B/L upper and lower extremities; DTRs 2+ intact and symmetric  CHEST/LUNG: Clear to percussion bilaterally; No rales, rhonchi, wheezing, or rubs  HEART: Regular rate and rhythm; No murmurs, rubs, or gallops  ABDOMEN: Soft, Nontender, Nondistended; Bowel sounds present  EXTREMITIES:  2+ Peripheral Pulses, No clubbing, cyanosis, or edema  SKIN: No rashes or lesions                                  11.9   7.5   )-----------( 147      ( 24 Aug 2017 07:11 )             35.6     08-24    145  |  111<H>  |  12  ----------------------------<  110<H>  4.1   |  26  |  0.65    Ca    9.1      24 Aug 2017 07:11                      MEDICATIONS  (STANDING):  lactated ringers. 1000 milliLiter(s) (75 mL/Hr) IV Continuous <Continuous>  pantoprazole    Tablet 40 milliGRAM(s) Oral before breakfast  senna 2 Tablet(s) Oral at bedtime  docusate sodium 100 milliGRAM(s) Oral three times a day  enoxaparin Injectable 30 milliGRAM(s) SubCutaneous every 12 hours  celecoxib 200 milliGRAM(s) Oral two times a day after meals  acetaminophen   Tablet. 1000 milliGRAM(s) Oral every 8 hours  topiramate 50 milliGRAM(s) Oral two times a day  buDESOnide 160 MICROgram(s)/formoterol 4.5 MICROgram(s) Inhaler 2 Puff(s) Inhalation two times a day  atorvastatin 10 milliGRAM(s) Oral at bedtime  ATENolol  Tablet 50 milliGRAM(s) Oral two times a day  montelukast 10 milliGRAM(s) Oral daily  erythromycin   Ointment 1 Application(s) Right EYE four times a day  fluticasone propionate 50 MICROgram(s)/spray Nasal Spray 2 Spray(s) Both Nostrils daily    MEDICATIONS  (PRN):  aluminum hydroxide/magnesium hydroxide/simethicone Suspension 30 milliLiter(s) Oral four times a day PRN Indigestion  ondansetron Injectable 4 milliGRAM(s) IV Push every 6 hours PRN Nausea and/or Vomiting  magnesium hydroxide Suspension 30 milliLiter(s) Oral daily PRN Constipation  polyethylene glycol 3350 17 Gram(s) Oral daily PRN Constipation  HYDROmorphone  Injectable 0.5 milliGRAM(s) IV Push every 3 hours PRN Severe Pain (7 - 10)  ALBUTerol    90 MICROgram(s) HFA Inhaler 2 Puff(s) Inhalation every 6 hours PRN Shortness of Breath and/or Wheezing  traMADol 50 milliGRAM(s) Oral every 4 hours PRN Moderate Pain (4 - 6)          RADIOLOGY RESULTS:

## 2017-08-24 NOTE — DISCHARGE NOTE ADULT - CARE PROVIDER_API CALL
Michi Samuel), Orthopaedic Surgery  [address] [place] [address]  Phone: [phone]  Fax: [phone]

## 2017-08-24 NOTE — DISCHARGE NOTE ADULT - HOSPITAL COURSE
This patient was admitted to [facility] with a history of severe degenerative joint disease of the bilateral knees.  Patient went to Pre-Surgical Testing at [facility] and was medically cleared to undergo elective procedure. Patient had Right knee replacement on 8/24/17 and Left knee replacement on*****. No operative or patrick-operative complications arose during patients hospital course.  Patient received antibiotic according to SCIP guidelines for infection prevention.  ***** was given for DVT prophylaxis.  Anesthesia, Medical Hospitalist, Physical Therapy and Occupational Therapy were consulted. Patient is stable for discharge with a good prognosis.  Appropriate discharge instructions and medications are provided in this document. This patient was admitted to [facility] with a history of severe degenerative joint disease of the bilateral knees.  Patient went to Pre-Surgical Testing at [facility] and was medically cleared to undergo elective procedure. Patient had Right knee replacement on 8/24/17 and Left knee replacement on 8/28/2017, both performed by Dr. Samuel. No operative or patrick-operative complications arose during patients hospital course.  Patient received antibiotic according to SCIP guidelines for infection prevention.  Lovenox was given for DVT prophylaxis following the 1st procedure and Eliquis was given for DVT prophylaxis following the second procedure.  Anesthesia, Medical Hospitalist, Physical Therapy and Occupational Therapy were consulted. Patient is stable for discharge with a good prognosis.  Appropriate discharge instructions and medications are provided in this document. This patient was admitted to [facility] with a history of severe degenerative joint disease of the bilateral knees failing conservative Tx, planned for staged bilateral TKR,  Right 1st, Left 2nd..  Patient went to Pre-Surgical Testing at [facility] and had Medical & Cardiology clearances to undergo elective procedures. Patient had Right knee replacement on 8/23/17 by Dr. Samuel under Regional anesthesia by Dr. R. Pallack  and Left TKR on 8/28/2017 by Dr. Samuel under regional anesthesia by Dr. JACQUELINE Agustin. No operative or patrick-operative complications arose during patients hospital course.  Patient received antibiotic according to SCIP guidelines for infection prevention.  Lovenox was given for DVT prophylaxis + Venodynes following the 1st procedure and Eliquis was given for DVT prophylaxis + Venodynes bilat. following the second procedure.  Anesthesia, Medical Hospitalist, Physical Therapy and Occupational Therapy were consulted. Labs followed by Medicine & Orthopedics. She had a stable Neuro & vascular exam Post-Op. Had clean incision with no dehiscence or SSI. Prineo closures intact. Patient is stable for discharge to [facility] with a good prognosis.  Appropriate discharge instructions and medications are provided in this document.

## 2017-08-24 NOTE — DISCHARGE NOTE ADULT - PATIENT PORTAL LINK FT
“You can access the Follow[facility] Patient Portal, offered by [facility], by registering with the following website: [email or web address]”

## 2017-08-24 NOTE — DISCHARGE NOTE ADULT - INSTRUCTIONS
For Constipation :   • Increase your water intake. Drink at least 8 glasses of water daily.  • Try adding fiber to your diet by eating fruits, vegetables and foods that are rich in grains.  • If you do experience constipation, you may take an over-the-counter stool softener/laxative such as Julianne Colace, [place] or  Milk of Magnesia.

## 2017-08-24 NOTE — DISCHARGE NOTE ADULT - PLAN OF CARE
Improvement of Activities of Daily Living Physical Therapy/Occupational Therapy for ambulation, transfers, stairs, ADL's, Range of Motion Exercises, Isometrics.  Full weight bearing as tolerated with rolling walker  Range of Motion Goals: Flexion 120 degrees; Extension 0 degrees  Keep incision clean and dry.  Suture/prineo dressing removal 14 days after surgery at rehab facility or Surgeon's office  May shower post-op day #5 if no drainage from incision  Follow all verbal and written instructions. Take medications as prescribed. DO NOT drive, operate machinery, and/or make important decisions while on prescription pain medication. DO NOT hesitate to call Doctor's office with questions or concerns.  - Call your doctor if you experience:  • An increase in pain not controlled by pain medication or change in activity or  position.  • Temperature greater than 101° F.  • Redness, increased swelling or foul smelling drainage from or around the  incision.  • Numbness, tingling or a change in color or temperature of the operative extremity.  • Call your doctor immediately if you experience chest pain, shortness of breath or calf pain. See below.

## 2017-08-24 NOTE — DISCHARGE NOTE ADULT - MEDICATION SUMMARY - MEDICATIONS TO STOP TAKING
I will STOP taking the medications listed below when I get home from the hospital:  None I will STOP taking the medications listed below when I get home from the hospital:    meloxicam 7.5 mg oral tablet  -- 1 tab(s) by mouth once a day    ibuprofen 800 mg oral tablet  -- 1 tab(s) by mouth 3 times a day, As Needed

## 2017-08-24 NOTE — PROGRESS NOTE ADULT - SUBJECTIVE AND OBJECTIVE BOX
ORTHOPEDIC PA PROGRESS NOTE  ALIYA MCNEILL      72y Female                                                                                                                               POD # 1       STATUS POST:               Pre-Op Dx: Primary osteoarthritis of right knee    Post-Op Dx:  Primary osteoarthritis of right knee    Procedure: Right knee replacement by Dr. Samuel 8/23/17                                                Pain (0-10):  Pt reports 5/10 pain controlled with medication. Pt denies any CP, SOB, fever, chills, numbness/tingling. Pt is positive void. negative BM.  Current Pain Management:   [X ] Po Analgesics [X ] IM /IV Analgesics     T(F): 98  HR: 56  BP: 127/66  RR: 16  SpO2: 97%                         11.9   7.5   )-----------( 147      ( 24 Aug 2017 07:11 )             35.6         08-24    145  |  111<H>  |  12  ----------------------------<  110<H>  4.1   |  26  |  0.65    Ca    9.1      24 Aug 2017 07:11      Physical Exam :    -   Dressing  C/D/I.   -   Distal Neurvascular status intact grossly.   -   Warm well perfused; capillary refill <3 seconds   -   (+)EHL/FHL   -   (+) Sensation to light touch  -   (-) Calf tenderness Bilaterally    A/P: 72y Female s/p Right knee replacement     -   Continue incentive  -   Continue PT/OT  -   f/u am labs  -   Pt did not like oxycodone so changed pain meds to tramadol  -   Pain control   -   Medicine to follow  -   DVT ppx:     [ x]SCDs       [x ] Lovenox  -   Weight bearing status:  WBAT [x ]          -  Dispo:     Pt staged for Left knee replacement with Dr. Claros on monday 8/24/17, dispo pending after

## 2017-08-24 NOTE — DISCHARGE NOTE ADULT - CARE PLAN
Principal Discharge DX:	Primary osteoarthritis of right knee  Goal:	Improvement of Activities of Daily Living  Instructions for follow-up, activity and diet:	Physical Therapy/Occupational Therapy for ambulation, transfers, stairs, ADL's, Range of Motion Exercises, Isometrics.  Full weight bearing as tolerated with rolling walker  Range of Motion Goals: Flexion 120 degrees; Extension 0 degrees  Keep incision clean and dry.  Suture/prineo dressing removal 14 days after surgery at rehab facility or Surgeon's office  May shower post-op day #5 if no drainage from incision  Follow all verbal and written instructions. Take medications as prescribed. DO NOT drive, operate machinery, and/or make important decisions while on prescription pain medication. DO NOT hesitate to call Doctor's office with questions or concerns.  - Call your doctor if you experience:  • An increase in pain not controlled by pain medication or change in activity or  position.  • Temperature greater than 101° F.  • Redness, increased swelling or foul smelling drainage from or around the  incision.  • Numbness, tingling or a change in color or temperature of the operative extremity.  • Call your doctor immediately if you experience chest pain, shortness of breath or calf pain. Principal Discharge DX:	Primary osteoarthritis of right knee  Goal:	Improvement of Activities of Daily Living  Instructions for follow-up, activity and diet:	See below.

## 2017-08-24 NOTE — OCCUPATIONAL THERAPY INITIAL EVALUATION ADULT - ADDITIONAL COMMENTS
Pt lives with spouse in  a private home with 6 LISA + railing Pt lives with spouse in  a private home with 6 LISA + railing. Pt has a tub. No DME/AE in place at this time

## 2017-08-24 NOTE — OCCUPATIONAL THERAPY INITIAL EVALUATION ADULT - ANTICIPATED DISCHARGE DISPOSITION, OT EVAL
pending progress after second TKR home w/ level of assist/rehabilitation facility/pending progress after second TKR

## 2017-08-24 NOTE — DISCHARGE NOTE ADULT - MEDICATION SUMMARY - MEDICATIONS TO TAKE
I will START or STAY ON the medications listed below when I get home from the hospital:    acetaminophen 500 mg oral tablet  -- 2 tab(s) by mouth every 8 hours  -- Indication: For Pain adjunct    celecoxib 200 mg oral capsule  -- 1 cap(s) by mouth 2 times a day (with meals)  -- Indication: For Pain adjunct    aspirin 81 mg oral tablet  -- 1 tab(s) by mouth once a day - as Ecotrin only  -- Indication: For Heart    oxyCODONE 10 mg oral tablet  -- 1 tab(s) by mouth every 4 hours, As needed, Moderate Pain (4 - 6)  -- Indication: For Surgical pain    apixaban 2.5 mg oral tablet  -- 1 tab(s) by mouth every 12 hours x 10 days more to complete total 12 days Post-Op  -- Indication: For VTE Prophylaxis    topiramate 50 mg oral tablet  -- 1 tab(s) by mouth 2 times a day  -- Indication: For CNS    Claritin 10 mg oral tablet  -- 1 tab(s) by mouth once a day, As Needed  -- Indication: For Allergy    Lipitor 40 mg oral tablet  -- 1 tab(s) by mouth once a day  -- Indication: For Lipids    butalbital  -- 1 tab(s) by mouth once a day, As Needed  -- Indication: For CNS    atenolol 50 mg oral tablet  -- 1 tab(s) by mouth 2 times a day  -- Indication: For Heart    albuterol 90 mcg/inh inhalation aerosol  -- 2 puff(s) inhaled every 6 hours, As needed, Shortness of Breath and/or Wheezing  -- Indication: For Lungs    Advair Diskus 250 mcg-50 mcg inhalation powder  -- 1 puff(s) inhaled 2 times a day  -- Indication: For Lungs    mometasone 0.1% topical cream  -- Apply on skin to affected area 2 times a day, As Needed to affected area  -- Indication: For Skin    lidocaine 5% topical film  -- Apply on skin to affected area once a day, As Needed for knee pain. Not to be placed near incisions.  -- Indication: For Knee pain adjunct    senna oral tablet  -- 2 tab(s) by mouth once a day (at bedtime). Stop for loose BMs  -- Indication: For GI    polyethylene glycol 3350 oral powder for reconstitution  -- 17 gram(s) by mouth once a day, As needed, Constipation  -- Indication: For GI    Colace 100 mg oral capsule  -- 1 cap(s) by mouth 2 times a day. Stop for loose BMs  -- Indication: For GI    Singulair 10 mg oral tablet  -- 1 tab(s) by mouth once a day  -- Indication: For Lungs    azelastine 137 mcg/inh (0.1%) nasal spray  -- 2 spray(s) into nose 2 times a day  -- Indication: For Nasal    pantoprazole 40 mg oral delayed release tablet  -- 1 tab(s) by mouth once a day  -- Indication: For GI    fluticasone 0.5 mg/2 mL inhalation suspension  -- 2 inhaler(s) inhaled once a day  -- Indication: For Lungs    Centrum Silver Women's  -- 1 tab(s) by mouth once a day  -- Indication: For Vitamins    Super B Complex oral tablet  -- 1 tab(s) by mouth once a day  -- Indication: For Vitamins    Caltrate 600 + D oral tablet  -- 1 tab(s) by mouth once a day  -- Indication: For Vitamins    biotin  -- 1 cap(s) by mouth once a day  -- Indication: For Vitamins I will START or STAY ON the medications listed below when I get home from the hospital:    acetaminophen 500 mg oral tablet  -- 2 tab(s) by mouth every 8 hours  -- Indication: For Pain adjunct    celecoxib 200 mg oral capsule  -- 1 cap(s) by mouth 2 times a day (with meals)  -- Indication: For Pain adjunct    aspirin 81 mg oral tablet  -- 1 tab(s) by mouth once a day - as Ecotrin only  -- Indication: For Heart    oxyCODONE 10 mg oral tablet  -- 1 tab(s) by mouth every 3 hours, As needed, Moderate Pain (4 - 6)  -- Indication: For Pain    apixaban 2.5 mg oral tablet  -- 1 tab(s) by mouth every 12 hours x 10 days more to complete total 12 days Post-Op  -- Indication: For VTE Prophylaxis    topiramate 50 mg oral tablet  -- 1 tab(s) by mouth 2 times a day  -- Indication: For CNS    Claritin 10 mg oral tablet  -- 1 tab(s) by mouth once a day, As Needed  -- Indication: For Allergy    Lipitor 40 mg oral tablet  -- 1 tab(s) by mouth once a day  -- Indication: For Lipids    butalbital  -- 1 tab(s) by mouth once a day, As Needed  -- Indication: For CNS    atenolol 50 mg oral tablet  -- 1 tab(s) by mouth 2 times a day  -- Indication: For Heart    albuterol 90 mcg/inh inhalation aerosol  -- 2 puff(s) inhaled every 6 hours, As needed, Shortness of Breath and/or Wheezing  -- Indication: For Lungs    Advair Diskus 250 mcg-50 mcg inhalation powder  -- 1 puff(s) inhaled 2 times a day  -- Indication: For Lungs    mometasone 0.1% topical cream  -- Apply on skin to affected area 2 times a day, As Needed to affected area  -- Indication: For Skin    lidocaine 5% topical film  -- Apply on skin to affected area once a day, As Needed for knee pain. Not to be placed near incisions.  -- Indication: For Knee pain adjunct    senna oral tablet  -- 2 tab(s) by mouth once a day (at bedtime). Stop for loose BMs  -- Indication: For GI    polyethylene glycol 3350 oral powder for reconstitution  -- 17 gram(s) by mouth once a day, As needed, Constipation  -- Indication: For GI    Colace 100 mg oral capsule  -- 1 cap(s) by mouth 2 times a day. Stop for loose BMs  -- Indication: For GI    Singulair 10 mg oral tablet  -- 1 tab(s) by mouth once a day  -- Indication: For Lungs    azelastine 137 mcg/inh (0.1%) nasal spray  -- 2 spray(s) into nose 2 times a day  -- Indication: For Nasal    pantoprazole 40 mg oral delayed release tablet  -- 1 tab(s) by mouth once a day  -- Indication: For GI    fluticasone 0.5 mg/2 mL inhalation suspension  -- 2 inhaler(s) inhaled once a day  -- Indication: For Lungs    Centrum Silver Women's  -- 1 tab(s) by mouth once a day  -- Indication: For Vitamins    Super B Complex oral tablet  -- 1 tab(s) by mouth once a day  -- Indication: For Vitamins    Caltrate 600 + D oral tablet  -- 1 tab(s) by mouth once a day  -- Indication: For Vitamins    biotin  -- 1 cap(s) by mouth once a day  -- Indication: For Vitamins

## 2017-08-24 NOTE — ANESTHESIA FOLLOW-UP NOTE - NSEVALATIONFT_GEN_ALL_CORE
pt arrived in preop prior to procedure with right red eye. Pt asymptomatic. in postop s/p procedure pt c/o right eye slightly blurry, no pain. Pt was evaluated by ophthalmology which said pt has subconjunctival hemorrhage, no intervention needed. POD#1 right eye redness improved. Pt denies pain or blurry vision. Pt comfortable.

## 2017-08-24 NOTE — DISCHARGE NOTE ADULT - NS AS ACTIVITY OBS
No Heavy lifting/straining/Showering allowed after post-operative day 5 of left knee/Do not make important decisions/Do not drive or operate machinery

## 2017-08-24 NOTE — DISCHARGE NOTE ADULT - ADDITIONAL INSTRUCTIONS
follow up with Dr. Samuel Sept 5th at 1:40pm PT/OT Total Knee Protocol; Ambulation, transfers, stairs, & ADLs.  Active & Passive knee ROM.  Full weight bearing as tolerated on operative knee. Ambulation with walker as advised by PT/OT.  ROM Goals: Ext = 0 drg.; Flex = 110-120 deg as tolerated  Apply Ice paks to operative knee daily for 30 min. every 3 hrs and post PT.  Eliquis for 10 days more, then follow with Ecotrin 162mg BID x 4 wks more( if no ASA contraindications).  CBC / Chem / LFTs per house MD in rehab to follow Eliquis.  Notify Surgeon for signs of knee cellulitis, severe knee pain, fever, fall/injury to operative knee/leg.  Routine office visit for Prineo tape removal in rehab surgeon's office 9/5/17 at 140 PM.  Instruct patient to follow up with her PCP within 2 weeks from arrival home for follow up exam/labs.

## 2017-08-25 LAB
ANION GAP SERPL CALC-SCNC: 8 MMOL/L — SIGNIFICANT CHANGE UP (ref 5–17)
BUN SERPL-MCNC: 12 MG/DL — SIGNIFICANT CHANGE UP (ref 7–23)
CALCIUM SERPL-MCNC: 9.1 MG/DL — SIGNIFICANT CHANGE UP (ref 8.4–10.5)
CHLORIDE SERPL-SCNC: 110 MMOL/L — HIGH (ref 96–108)
CO2 SERPL-SCNC: 25 MMOL/L — SIGNIFICANT CHANGE UP (ref 22–31)
CREAT SERPL-MCNC: 0.81 MG/DL — SIGNIFICANT CHANGE UP (ref 0.5–1.3)
GLUCOSE SERPL-MCNC: 119 MG/DL — HIGH (ref 70–99)
HCT VFR BLD CALC: 34.8 % — SIGNIFICANT CHANGE UP (ref 34.5–45)
HGB BLD-MCNC: 12.1 G/DL — SIGNIFICANT CHANGE UP (ref 11.5–15.5)
MCHC RBC-ENTMCNC: 32.7 PG — SIGNIFICANT CHANGE UP (ref 27–34)
MCHC RBC-ENTMCNC: 34.8 GM/DL — SIGNIFICANT CHANGE UP (ref 32–36)
MCV RBC AUTO: 93.8 FL — SIGNIFICANT CHANGE UP (ref 80–100)
PLATELET # BLD AUTO: 138 K/UL — LOW (ref 150–400)
POTASSIUM SERPL-MCNC: 4.6 MMOL/L — SIGNIFICANT CHANGE UP (ref 3.5–5.3)
POTASSIUM SERPL-SCNC: 4.6 MMOL/L — SIGNIFICANT CHANGE UP (ref 3.5–5.3)
RBC # BLD: 3.71 M/UL — LOW (ref 3.8–5.2)
RBC # FLD: 11.8 % — SIGNIFICANT CHANGE UP (ref 10.3–14.5)
SODIUM SERPL-SCNC: 143 MMOL/L — SIGNIFICANT CHANGE UP (ref 135–145)
WBC # BLD: 7.5 K/UL — SIGNIFICANT CHANGE UP (ref 3.8–10.5)
WBC # FLD AUTO: 7.5 K/UL — SIGNIFICANT CHANGE UP (ref 3.8–10.5)

## 2017-08-25 PROCEDURE — 99232 SBSQ HOSP IP/OBS MODERATE 35: CPT

## 2017-08-25 PROCEDURE — 93970 EXTREMITY STUDY: CPT | Mod: 26

## 2017-08-25 RX ORDER — TRAMADOL HYDROCHLORIDE 50 MG/1
50 TABLET ORAL EVERY 4 HOURS
Qty: 0 | Refills: 0 | Status: DISCONTINUED | OUTPATIENT
Start: 2017-08-25 | End: 2017-08-28

## 2017-08-25 RX ORDER — TRAMADOL HYDROCHLORIDE 50 MG/1
100 TABLET ORAL EVERY 4 HOURS
Qty: 0 | Refills: 0 | Status: DISCONTINUED | OUTPATIENT
Start: 2017-08-25 | End: 2017-08-25

## 2017-08-25 RX ORDER — TRAMADOL HYDROCHLORIDE 50 MG/1
100 TABLET ORAL EVERY 4 HOURS
Qty: 0 | Refills: 0 | Status: DISCONTINUED | OUTPATIENT
Start: 2017-08-25 | End: 2017-08-28

## 2017-08-25 RX ADMIN — Medication 1000 MILLIGRAM(S): at 12:52

## 2017-08-25 RX ADMIN — CELECOXIB 200 MILLIGRAM(S): 200 CAPSULE ORAL at 17:34

## 2017-08-25 RX ADMIN — Medication 50 MILLIGRAM(S): at 06:05

## 2017-08-25 RX ADMIN — ENOXAPARIN SODIUM 30 MILLIGRAM(S): 100 INJECTION SUBCUTANEOUS at 08:22

## 2017-08-25 RX ADMIN — ATENOLOL 50 MILLIGRAM(S): 25 TABLET ORAL at 17:34

## 2017-08-25 RX ADMIN — TRAMADOL HYDROCHLORIDE 100 MILLIGRAM(S): 50 TABLET ORAL at 23:05

## 2017-08-25 RX ADMIN — ATENOLOL 50 MILLIGRAM(S): 25 TABLET ORAL at 06:05

## 2017-08-25 RX ADMIN — ENOXAPARIN SODIUM 30 MILLIGRAM(S): 100 INJECTION SUBCUTANEOUS at 21:22

## 2017-08-25 RX ADMIN — TRAMADOL HYDROCHLORIDE 100 MILLIGRAM(S): 50 TABLET ORAL at 22:06

## 2017-08-25 RX ADMIN — PANTOPRAZOLE SODIUM 40 MILLIGRAM(S): 20 TABLET, DELAYED RELEASE ORAL at 06:05

## 2017-08-25 RX ADMIN — TRAMADOL HYDROCHLORIDE 100 MILLIGRAM(S): 50 TABLET ORAL at 08:22

## 2017-08-25 RX ADMIN — TRAMADOL HYDROCHLORIDE 100 MILLIGRAM(S): 50 TABLET ORAL at 17:48

## 2017-08-25 RX ADMIN — BUDESONIDE AND FORMOTEROL FUMARATE DIHYDRATE 2 PUFF(S): 160; 4.5 AEROSOL RESPIRATORY (INHALATION) at 06:06

## 2017-08-25 RX ADMIN — TRAMADOL HYDROCHLORIDE 100 MILLIGRAM(S): 50 TABLET ORAL at 12:52

## 2017-08-25 RX ADMIN — CELECOXIB 200 MILLIGRAM(S): 200 CAPSULE ORAL at 08:23

## 2017-08-25 RX ADMIN — Medication 2 SPRAY(S): at 12:52

## 2017-08-25 RX ADMIN — Medication 50 MILLIGRAM(S): at 17:34

## 2017-08-25 RX ADMIN — Medication 1000 MILLIGRAM(S): at 03:10

## 2017-08-25 RX ADMIN — TRAMADOL HYDROCHLORIDE 100 MILLIGRAM(S): 50 TABLET ORAL at 13:30

## 2017-08-25 RX ADMIN — Medication 1000 MILLIGRAM(S): at 21:24

## 2017-08-25 RX ADMIN — ATORVASTATIN CALCIUM 10 MILLIGRAM(S): 80 TABLET, FILM COATED ORAL at 21:22

## 2017-08-25 RX ADMIN — MONTELUKAST 10 MILLIGRAM(S): 4 TABLET, CHEWABLE ORAL at 17:34

## 2017-08-25 RX ADMIN — Medication 1000 MILLIGRAM(S): at 21:22

## 2017-08-25 RX ADMIN — CELECOXIB 200 MILLIGRAM(S): 200 CAPSULE ORAL at 08:22

## 2017-08-25 RX ADMIN — TRAMADOL HYDROCHLORIDE 100 MILLIGRAM(S): 50 TABLET ORAL at 09:05

## 2017-08-25 RX ADMIN — Medication 1000 MILLIGRAM(S): at 12:53

## 2017-08-25 RX ADMIN — TRAMADOL HYDROCHLORIDE 100 MILLIGRAM(S): 50 TABLET ORAL at 18:34

## 2017-08-25 NOTE — PROGRESS NOTE ADULT - SUBJECTIVE AND OBJECTIVE BOX
SUBJECTIVE: Patient seen and examined. No nausea/vomiting, nor shortness of breath. Patient has also had loose stool and has stopped the stool softners. Patient agrees to proceed for the left TKR this coming monday    OBJECTIVE:   Vital Signs Last 24 Hrs  T(C): 36.9 (25 Aug 2017 07:30), Max: 37 (24 Aug 2017 19:00)  T(F): 98.4 (25 Aug 2017 07:30), Max: 98.6 (24 Aug 2017 19:00)  HR: 59 (25 Aug 2017 07:30) (59 - 95)  BP: 152/86 (25 Aug 2017 07:30) (106/65 - 152/86)  BP(mean): --  RR: 17 (25 Aug 2017 07:30) (16 - 17)  SpO2: 97% (25 Aug 2017 07:30) (94% - 98%)    PAIN SCORE:   3      SCALE USED: (1-10 VNRS)        Affected extremity:          Dressing: clean/dry/intact  and removed this morning. Surgical incision with Prineo and no erythema.          Sensation:  intact to light touch and +2DP         Motor exam:          5/ 5 Tibialis Anterior/Gastrocnemius-Soleus , EHL         warm well perfused; capillary refill <3 seconds     LABS:                        12.1   7.5   )-----------( 138      ( 25 Aug 2017 07:41 )             34.8     08-25    143  |  110<H>  |  12  ----------------------------<  119<H>  4.6   |  25  |  0.81    Ca    9.1      25 Aug 2017 07:41        CAPILLARY BLOOD GLUCOSE          MEDICATIONS:    Anticoagulation:  enoxaparin Injectable 30 milliGRAM(s) SubCutaneous every 12 hours      Antibiotics: finished      Pain medications:   ondansetron Injectable 4 milliGRAM(s) IV Push every 6 hours PRN  celecoxib 200 milliGRAM(s) Oral two times a day after meals  HYDROmorphone  Injectable 0.5 milliGRAM(s) IV Push every 3 hours PRN  acetaminophen   Tablet. 1000 milliGRAM(s) Oral every 8 hours  topiramate 50 milliGRAM(s) Oral two times a day  traMADol 50 milliGRAM(s) Oral every 4 hours PRN  traMADol 100 milliGRAM(s) Oral every 6 hours PRN      A/P :  s/p Right TKR stage 1  POD # 2; preop for monday for the left knee  -    Pain control  -    DVT ppx: Lovenox then will stop on Sunday for surgery on Monday. Us doppler to r/o DVT  -   Noted AM labs  -    Weight bearing status: WBAT   -    Physical Therapy  -    Dispo: Home

## 2017-08-25 NOTE — PROGRESS NOTE ADULT - SUBJECTIVE AND OBJECTIVE BOX
Pain well controlled.  Offers no other complaints      Constitutional: No fever, fatigue or weight loss.  Skin: No rash.  Eyes: No recent vision problems or eye pain.  ENT: No congestion, ear pain, or sore throat.  Endocrine: No thyroid problems.  Cardiovascular: No chest pain or palpation.  Respiratory: No cough, shortness of breath, congestion, or wheezing.  Gastrointestinal: No abdominal pain, nausea, vomiting, or diarrhea.  Genitourinary: No dysuria.  Musculoskeletal: No joint swelling.  Neurologic: No headache.      MEDICATIONS  (STANDING):  pantoprazole    Tablet 40 milliGRAM(s) Oral before breakfast  senna 2 Tablet(s) Oral at bedtime  docusate sodium 100 milliGRAM(s) Oral three times a day  enoxaparin Injectable 30 milliGRAM(s) SubCutaneous every 12 hours  celecoxib 200 milliGRAM(s) Oral two times a day after meals  acetaminophen   Tablet. 1000 milliGRAM(s) Oral every 8 hours  topiramate 50 milliGRAM(s) Oral two times a day  buDESOnide 160 MICROgram(s)/formoterol 4.5 MICROgram(s) Inhaler 2 Puff(s) Inhalation two times a day  atorvastatin 10 milliGRAM(s) Oral at bedtime  ATENolol  Tablet 50 milliGRAM(s) Oral two times a day  montelukast 10 milliGRAM(s) Oral daily  fluticasone propionate 50 MICROgram(s)/spray Nasal Spray 2 Spray(s) Both Nostrils daily    MEDICATIONS  (PRN):  aluminum hydroxide/magnesium hydroxide/simethicone Suspension 30 milliLiter(s) Oral four times a day PRN Indigestion  ondansetron Injectable 4 milliGRAM(s) IV Push every 6 hours PRN Nausea and/or Vomiting  magnesium hydroxide Suspension 30 milliLiter(s) Oral daily PRN Constipation  polyethylene glycol 3350 17 Gram(s) Oral daily PRN Constipation  bisacodyl Suppository 10 milliGRAM(s) Rectal daily PRN If no bowel movement by postoperative day #2  HYDROmorphone  Injectable 0.5 milliGRAM(s) IV Push every 3 hours PRN Severe Pain (7 - 10)  ALBUTerol    90 MICROgram(s) HFA Inhaler 2 Puff(s) Inhalation every 6 hours PRN Shortness of Breath and/or Wheezing  traMADol 50 milliGRAM(s) Oral every 4 hours PRN Mild Pain (1 - 3)  loratadine 10 milliGRAM(s) Oral daily PRN allergies  traMADol 100 milliGRAM(s) Oral every 6 hours PRN Moderate Pain (4 - 6)      Vital Signs Last 24 Hrs  T(C): 36.9 (25 Aug 2017 07:30), Max: 37 (24 Aug 2017 19:00)  T(F): 98.4 (25 Aug 2017 07:30), Max: 98.6 (24 Aug 2017 19:00)  HR: 59 (25 Aug 2017 07:30) (59 - 95)  BP: 152/86 (25 Aug 2017 07:30) (106/65 - 152/86)  BP(mean): --  RR: 17 (25 Aug 2017 07:30) (16 - 17)  SpO2: 97% (25 Aug 2017 07:30) (94% - 98%)        PHYSICAL EXAM-  GENERAL: NAD, well-groomed, well-developed  HEAD:  Atraumatic, Normocephalic  EYES: EOMI, PERRLA, conjunctiva and sclera clear  NECK: Supple, No JVD, Normal thyroid  NERVOUS SYSTEM:  Alert & Oriented X3, Motor Strength 5/5 B/L upper and lower extremities; DTRs 2+ intact and symmetric  CHEST/LUNG: Clear to percussion bilaterally; No rales, rhonchi, wheezing, or rubs  HEART: Regular rate and rhythm; No murmurs, rubs, or gallops  ABDOMEN: Soft, Nontender, Nondistended; Bowel sounds present  EXTREMITIES:  2+ Peripheral Pulses, No clubbing, cyanosis, or edema  SKIN: No rashes or lesions                                12.1   7.5   )-----------( 138      ( 25 Aug 2017 07:41 )             34.8     08-25    143  |  110<H>  |  12  ----------------------------<  119<H>  4.6   |  25  |  0.81    Ca    9.1      25 Aug 2017 07:41

## 2017-08-26 LAB
ANION GAP SERPL CALC-SCNC: 8 MMOL/L — SIGNIFICANT CHANGE UP (ref 5–17)
BUN SERPL-MCNC: 12 MG/DL — SIGNIFICANT CHANGE UP (ref 7–23)
CALCIUM SERPL-MCNC: 9.2 MG/DL — SIGNIFICANT CHANGE UP (ref 8.4–10.5)
CHLORIDE SERPL-SCNC: 108 MMOL/L — SIGNIFICANT CHANGE UP (ref 96–108)
CO2 SERPL-SCNC: 26 MMOL/L — SIGNIFICANT CHANGE UP (ref 22–31)
CREAT SERPL-MCNC: 0.78 MG/DL — SIGNIFICANT CHANGE UP (ref 0.5–1.3)
GLUCOSE SERPL-MCNC: 117 MG/DL — HIGH (ref 70–99)
HCT VFR BLD CALC: 35 % — SIGNIFICANT CHANGE UP (ref 34.5–45)
HGB BLD-MCNC: 11.3 G/DL — LOW (ref 11.5–15.5)
MCHC RBC-ENTMCNC: 30.8 PG — SIGNIFICANT CHANGE UP (ref 27–34)
MCHC RBC-ENTMCNC: 32.3 GM/DL — SIGNIFICANT CHANGE UP (ref 32–36)
MCV RBC AUTO: 95.4 FL — SIGNIFICANT CHANGE UP (ref 80–100)
PLATELET # BLD AUTO: 166 K/UL — SIGNIFICANT CHANGE UP (ref 150–400)
POTASSIUM SERPL-MCNC: 4.2 MMOL/L — SIGNIFICANT CHANGE UP (ref 3.5–5.3)
POTASSIUM SERPL-SCNC: 4.2 MMOL/L — SIGNIFICANT CHANGE UP (ref 3.5–5.3)
RBC # BLD: 3.68 M/UL — LOW (ref 3.8–5.2)
RBC # FLD: 12.4 % — SIGNIFICANT CHANGE UP (ref 10.3–14.5)
SODIUM SERPL-SCNC: 142 MMOL/L — SIGNIFICANT CHANGE UP (ref 135–145)
WBC # BLD: 6.7 K/UL — SIGNIFICANT CHANGE UP (ref 3.8–10.5)
WBC # FLD AUTO: 6.7 K/UL — SIGNIFICANT CHANGE UP (ref 3.8–10.5)

## 2017-08-26 PROCEDURE — 99233 SBSQ HOSP IP/OBS HIGH 50: CPT

## 2017-08-26 RX ADMIN — PANTOPRAZOLE SODIUM 40 MILLIGRAM(S): 20 TABLET, DELAYED RELEASE ORAL at 05:54

## 2017-08-26 RX ADMIN — ENOXAPARIN SODIUM 30 MILLIGRAM(S): 100 INJECTION SUBCUTANEOUS at 21:00

## 2017-08-26 RX ADMIN — CELECOXIB 200 MILLIGRAM(S): 200 CAPSULE ORAL at 17:21

## 2017-08-26 RX ADMIN — Medication 1000 MILLIGRAM(S): at 21:11

## 2017-08-26 RX ADMIN — TRAMADOL HYDROCHLORIDE 100 MILLIGRAM(S): 50 TABLET ORAL at 08:49

## 2017-08-26 RX ADMIN — Medication 1000 MILLIGRAM(S): at 17:25

## 2017-08-26 RX ADMIN — TRAMADOL HYDROCHLORIDE 100 MILLIGRAM(S): 50 TABLET ORAL at 17:22

## 2017-08-26 RX ADMIN — TRAMADOL HYDROCHLORIDE 100 MILLIGRAM(S): 50 TABLET ORAL at 14:00

## 2017-08-26 RX ADMIN — CELECOXIB 200 MILLIGRAM(S): 200 CAPSULE ORAL at 17:25

## 2017-08-26 RX ADMIN — TRAMADOL HYDROCHLORIDE 100 MILLIGRAM(S): 50 TABLET ORAL at 18:02

## 2017-08-26 RX ADMIN — ENOXAPARIN SODIUM 30 MILLIGRAM(S): 100 INJECTION SUBCUTANEOUS at 08:49

## 2017-08-26 RX ADMIN — Medication 50 MILLIGRAM(S): at 05:54

## 2017-08-26 RX ADMIN — Medication 2 SPRAY(S): at 12:21

## 2017-08-26 RX ADMIN — TRAMADOL HYDROCHLORIDE 100 MILLIGRAM(S): 50 TABLET ORAL at 09:30

## 2017-08-26 RX ADMIN — SENNA PLUS 2 TABLET(S): 8.6 TABLET ORAL at 21:10

## 2017-08-26 RX ADMIN — Medication 1000 MILLIGRAM(S): at 05:56

## 2017-08-26 RX ADMIN — TRAMADOL HYDROCHLORIDE 100 MILLIGRAM(S): 50 TABLET ORAL at 13:13

## 2017-08-26 RX ADMIN — Medication 1000 MILLIGRAM(S): at 05:54

## 2017-08-26 RX ADMIN — ATENOLOL 50 MILLIGRAM(S): 25 TABLET ORAL at 17:21

## 2017-08-26 RX ADMIN — CELECOXIB 200 MILLIGRAM(S): 200 CAPSULE ORAL at 08:53

## 2017-08-26 RX ADMIN — TRAMADOL HYDROCHLORIDE 50 MILLIGRAM(S): 50 TABLET ORAL at 21:00

## 2017-08-26 RX ADMIN — Medication 100 MILLIGRAM(S): at 21:10

## 2017-08-26 RX ADMIN — CELECOXIB 200 MILLIGRAM(S): 200 CAPSULE ORAL at 08:48

## 2017-08-26 RX ADMIN — BUDESONIDE AND FORMOTEROL FUMARATE DIHYDRATE 2 PUFF(S): 160; 4.5 AEROSOL RESPIRATORY (INHALATION) at 05:59

## 2017-08-26 RX ADMIN — ATORVASTATIN CALCIUM 10 MILLIGRAM(S): 80 TABLET, FILM COATED ORAL at 21:10

## 2017-08-26 RX ADMIN — Medication 50 MILLIGRAM(S): at 17:21

## 2017-08-26 RX ADMIN — BUDESONIDE AND FORMOTEROL FUMARATE DIHYDRATE 2 PUFF(S): 160; 4.5 AEROSOL RESPIRATORY (INHALATION) at 18:42

## 2017-08-26 RX ADMIN — Medication 1000 MILLIGRAM(S): at 17:20

## 2017-08-26 RX ADMIN — MONTELUKAST 10 MILLIGRAM(S): 4 TABLET, CHEWABLE ORAL at 17:21

## 2017-08-26 RX ADMIN — TRAMADOL HYDROCHLORIDE 50 MILLIGRAM(S): 50 TABLET ORAL at 21:30

## 2017-08-26 RX ADMIN — ATENOLOL 50 MILLIGRAM(S): 25 TABLET ORAL at 05:54

## 2017-08-26 NOTE — PROGRESS NOTE ADULT - SUBJECTIVE AND OBJECTIVE BOX
Patient is a 72y old  Female who presents with a chief complaint of Knee Pain (24 Aug 2017 10:26)      INTERVAL HPI/OVERNIGHT EVENTS: no acute events overnight.     MEDICATIONS  (STANDING):  pantoprazole    Tablet 40 milliGRAM(s) Oral before breakfast  senna 2 Tablet(s) Oral at bedtime  docusate sodium 100 milliGRAM(s) Oral three times a day  enoxaparin Injectable 30 milliGRAM(s) SubCutaneous every 12 hours  celecoxib 200 milliGRAM(s) Oral two times a day after meals  acetaminophen   Tablet. 1000 milliGRAM(s) Oral every 8 hours  topiramate 50 milliGRAM(s) Oral two times a day  buDESOnide 160 MICROgram(s)/formoterol 4.5 MICROgram(s) Inhaler 2 Puff(s) Inhalation two times a day  atorvastatin 10 milliGRAM(s) Oral at bedtime  ATENolol  Tablet 50 milliGRAM(s) Oral two times a day  montelukast 10 milliGRAM(s) Oral daily  fluticasone propionate 50 MICROgram(s)/spray Nasal Spray 2 Spray(s) Both Nostrils daily    MEDICATIONS  (PRN):  aluminum hydroxide/magnesium hydroxide/simethicone Suspension 30 milliLiter(s) Oral four times a day PRN Indigestion  ondansetron Injectable 4 milliGRAM(s) IV Push every 6 hours PRN Nausea and/or Vomiting  magnesium hydroxide Suspension 30 milliLiter(s) Oral daily PRN Constipation  polyethylene glycol 3350 17 Gram(s) Oral daily PRN Constipation  bisacodyl Suppository 10 milliGRAM(s) Rectal daily PRN If no bowel movement by postoperative day #2  HYDROmorphone  Injectable 0.5 milliGRAM(s) IV Push every 3 hours PRN Severe Pain (7 - 10)  ALBUTerol    90 MICROgram(s) HFA Inhaler 2 Puff(s) Inhalation every 6 hours PRN Shortness of Breath and/or Wheezing  loratadine 10 milliGRAM(s) Oral daily PRN allergies  traMADol 50 milliGRAM(s) Oral every 4 hours PRN Mild Pain (1 - 3)  traMADol 100 milliGRAM(s) Oral every 4 hours PRN Moderate Pain (4 - 6)      Allergies    No Known Allergies    Intolerances    Demerol HCl (Nausea; Vomiting)      REVIEW OF SYSTEMS:  CONSTITUTIONAL: No fever, weight loss, or fatigue  EYES: No eye pain, visual disturbances, or discharge  ENMT:  No difficulty hearing, tinnitus, vertigo; No sinus or throat pain  NECK: No pain or stiffness  BREASTS: No pain, masses, or nipple discharge  RESPIRATORY: No cough, wheezing, chills or hemoptysis; No shortness of breath  CARDIOVASCULAR: No chest pain, palpitations, dizziness, or leg swelling  GASTROINTESTINAL: No abdominal or epigastric pain. No nausea, vomiting, or hematemesis; No diarrhea or constipation. No melena or hematochezia.  GENITOURINARY: No dysuria, frequency, hematuria, or incontinence  NEUROLOGICAL: No headaches, memory loss, loss of strength, numbness, or tremors  SKIN: No itching, burning, rashes, or lesions   LYMPH NODES: No enlarged glands  ENDOCRINE: No heat or cold intolerance; No hair loss; No polydipsia or polyuria  MUSCULOSKELETAL:c/o knee pain.       Vital Signs Last 24 Hrs  T(C): 36.5 (26 Aug 2017 08:13), Max: 36.8 (25 Aug 2017 23:00)  T(F): 97.7 (26 Aug 2017 08:13), Max: 98.2 (25 Aug 2017 23:00)  HR: 60 (26 Aug 2017 08:13) (60 - 67)  BP: 119/63 (26 Aug 2017 08:13) (99/72 - 124/75)  BP(mean): --  RR: 16 (26 Aug 2017 08:13) (16 - 16)  SpO2: 94% (26 Aug 2017 08:13) (94% - 98%)    PHYSICAL EXAM:  GENERAL: NAD, well-groomed, well-developed  HEAD:  Atraumatic, Normocephalic  EYES: EOMI, PERRLA, conjunctiva and sclera clear    NECK: Supple,   NERVOUS SYSTEM:  Alert & Oriented X3, Good concentration; no focal neurology.   CHEST/LUNG: Clear to auscultation bilaterally; No rales, rhonchi, wheezing, or rubs  HEART: Regular rate and rhythm; No murmurs, rubs, or gallops  ABDOMEN: Soft, Nontender, Nondistended; Bowel sounds present  EXTREMITIES:  2+ Peripheral Pulses, No clubbing, cyanosis, or edema      LABS:                        11.3   6.7   )-----------( 166      ( 26 Aug 2017 07:15 )             35.0     26 Aug 2017 07:15    142    |  108    |  12     ----------------------------<  117    4.2     |  26     |  0.78     Ca    9.2        26 Aug 2017 07:15              CAPILLARY BLOOD GLUCOSE          RADIOLOGY & ADDITIONAL TESTS:    Imaging Personally Reviewed:  [ ] YES     Consultant(s) Notes Reviewed:      Care Discussed with Consultants/Other Providers:

## 2017-08-26 NOTE — PROGRESS NOTE ADULT - SUBJECTIVE AND OBJECTIVE BOX
ORTHOPEDIC PA PROGRESS NOTE  ALIYA MCNEILL      72y Female                                                                                                                               POD #  3      STATUS POST:               Pre-Op Dx: Primary osteoarthritis of left knee    Post-Op Dx:  Primary osteoarthritis of right knee    Procedure: Right knee replacement                                                Pain (0-10):   Current Pain Management:  [ ] PCA   [X ] Po Analgesics [ ] IM /IV Anagesics     T(F): --  HR: 62  BP: 124/75  RR: --  SpO2: --                        11.3   6.7   )-----------( 166      ( 26 Aug 2017 07:15 )             35.0                     08-26    142  |  108  |  12  ----------------------------<  117<H>  4.2   |  26  |  0.78    Ca    9.2      26 Aug 2017 07:15      Physical Exam :    -   Dressing changed sterile.   -   Wound C/D/I.   -   Distal Neurvascular status intact grossly.   -   Warm well perfused; capillary refill <3 seconds   -   (+)EHL/FHL 5/5  -   (+) Sensation to light touch  -   (-) Calf tenderness Bilaterally  < from: US Duplex Venous Lower Ext Complete, Bilateral (08.25.17 @ 12:23) >  EXAM:  US DPLX LWR EXT VEINS COMPL BI                                  PROCEDURE DATE:  08/25/2017          INTERPRETATION:  CLINICAL INFORMATION: Status post right total knee   replacement. Leg pain.    COMPARISON: None available.    TECHNIQUE: Duplex sonography of the BILATERAL LOWER extremities with   color and spectral Doppler, with and without compression.      FINDINGS:    There is normal compressibility of the bilateral common femoral, femoral   and popliteal veins. No calf vein thrombosis is detected.    Doppler examination shows normal spontaneous and phasic flow.    IMPRESSION:     No evidence of bilateral lower extremity deep venous thrombosis.                      NICOLE TROTTER M.D., ATTENDING RADIOLOGIST  This document has been electronically signed. Aug 25 2017  1:44PM    < end of copied text >      A/P: 72y Female s/p Primary osteoarthritis of right knee  -   staged for Monday Lt TKA  -   Ortho Stable  -   Pain control   -   Medicine to follow  -   DVT ppx:     [ ]SCDs     [ ] ASA     [ ] Eliquis     [ ] Lovenox  -   Weight bearing status:  WBAT [X ]        PWB    [ ]     TTWB  [ ]      NWB  [ ]   -  Dispo:     Home [ ]     Acute Rehab [ ]     [facility] [X ]     TBD [ ]

## 2017-08-27 LAB
ANION GAP SERPL CALC-SCNC: 5 MMOL/L — SIGNIFICANT CHANGE UP (ref 5–17)
BLD GP AB SCN SERPL QL: SIGNIFICANT CHANGE UP
BUN SERPL-MCNC: 15 MG/DL — SIGNIFICANT CHANGE UP (ref 7–23)
CALCIUM SERPL-MCNC: 8.7 MG/DL — SIGNIFICANT CHANGE UP (ref 8.4–10.5)
CHLORIDE SERPL-SCNC: 108 MMOL/L — SIGNIFICANT CHANGE UP (ref 96–108)
CO2 SERPL-SCNC: 28 MMOL/L — SIGNIFICANT CHANGE UP (ref 22–31)
CREAT SERPL-MCNC: 0.78 MG/DL — SIGNIFICANT CHANGE UP (ref 0.5–1.3)
GLUCOSE SERPL-MCNC: 104 MG/DL — HIGH (ref 70–99)
HCT VFR BLD CALC: 33.5 % — LOW (ref 34.5–45)
HGB BLD-MCNC: 11 G/DL — LOW (ref 11.5–15.5)
INR BLD: 1.07 RATIO — SIGNIFICANT CHANGE UP (ref 0.88–1.16)
MCHC RBC-ENTMCNC: 31 PG — SIGNIFICANT CHANGE UP (ref 27–34)
MCHC RBC-ENTMCNC: 32.8 GM/DL — SIGNIFICANT CHANGE UP (ref 32–36)
MCV RBC AUTO: 94.4 FL — SIGNIFICANT CHANGE UP (ref 80–100)
PLATELET # BLD AUTO: 161 K/UL — SIGNIFICANT CHANGE UP (ref 150–400)
POTASSIUM SERPL-MCNC: 3.4 MMOL/L — LOW (ref 3.5–5.3)
POTASSIUM SERPL-SCNC: 3.4 MMOL/L — LOW (ref 3.5–5.3)
PROTHROM AB SERPL-ACNC: 11.7 SEC — SIGNIFICANT CHANGE UP (ref 9.8–12.7)
RBC # BLD: 3.54 M/UL — LOW (ref 3.8–5.2)
RBC # FLD: 12 % — SIGNIFICANT CHANGE UP (ref 10.3–14.5)
SODIUM SERPL-SCNC: 141 MMOL/L — SIGNIFICANT CHANGE UP (ref 135–145)
WBC # BLD: 5.1 K/UL — SIGNIFICANT CHANGE UP (ref 3.8–10.5)
WBC # FLD AUTO: 5.1 K/UL — SIGNIFICANT CHANGE UP (ref 3.8–10.5)

## 2017-08-27 PROCEDURE — 99233 SBSQ HOSP IP/OBS HIGH 50: CPT

## 2017-08-27 RX ORDER — POTASSIUM CHLORIDE 20 MEQ
40 PACKET (EA) ORAL ONCE
Qty: 0 | Refills: 0 | Status: COMPLETED | OUTPATIENT
Start: 2017-08-27 | End: 2017-08-27

## 2017-08-27 RX ORDER — DIPHENHYDRAMINE HCL 50 MG
25 CAPSULE ORAL EVERY 4 HOURS
Qty: 0 | Refills: 0 | Status: DISCONTINUED | OUTPATIENT
Start: 2017-08-27 | End: 2017-08-27

## 2017-08-27 RX ADMIN — Medication 50 MILLIGRAM(S): at 17:41

## 2017-08-27 RX ADMIN — CELECOXIB 200 MILLIGRAM(S): 200 CAPSULE ORAL at 17:44

## 2017-08-27 RX ADMIN — Medication 1000 MILLIGRAM(S): at 05:53

## 2017-08-27 RX ADMIN — TRAMADOL HYDROCHLORIDE 100 MILLIGRAM(S): 50 TABLET ORAL at 14:00

## 2017-08-27 RX ADMIN — Medication 1000 MILLIGRAM(S): at 21:39

## 2017-08-27 RX ADMIN — TRAMADOL HYDROCHLORIDE 100 MILLIGRAM(S): 50 TABLET ORAL at 13:04

## 2017-08-27 RX ADMIN — Medication 100 MILLIGRAM(S): at 05:53

## 2017-08-27 RX ADMIN — TRAMADOL HYDROCHLORIDE 50 MILLIGRAM(S): 50 TABLET ORAL at 09:00

## 2017-08-27 RX ADMIN — ATORVASTATIN CALCIUM 10 MILLIGRAM(S): 80 TABLET, FILM COATED ORAL at 21:38

## 2017-08-27 RX ADMIN — TRAMADOL HYDROCHLORIDE 50 MILLIGRAM(S): 50 TABLET ORAL at 08:25

## 2017-08-27 RX ADMIN — Medication 100 MILLIGRAM(S): at 21:42

## 2017-08-27 RX ADMIN — TRAMADOL HYDROCHLORIDE 100 MILLIGRAM(S): 50 TABLET ORAL at 17:42

## 2017-08-27 RX ADMIN — TRAMADOL HYDROCHLORIDE 100 MILLIGRAM(S): 50 TABLET ORAL at 18:15

## 2017-08-27 RX ADMIN — ATENOLOL 50 MILLIGRAM(S): 25 TABLET ORAL at 17:41

## 2017-08-27 RX ADMIN — Medication 1000 MILLIGRAM(S): at 16:55

## 2017-08-27 RX ADMIN — ENOXAPARIN SODIUM 30 MILLIGRAM(S): 100 INJECTION SUBCUTANEOUS at 08:25

## 2017-08-27 RX ADMIN — Medication 50 MILLIGRAM(S): at 05:53

## 2017-08-27 RX ADMIN — CELECOXIB 200 MILLIGRAM(S): 200 CAPSULE ORAL at 17:41

## 2017-08-27 RX ADMIN — TRAMADOL HYDROCHLORIDE 100 MILLIGRAM(S): 50 TABLET ORAL at 21:39

## 2017-08-27 RX ADMIN — Medication 1000 MILLIGRAM(S): at 15:35

## 2017-08-27 RX ADMIN — CELECOXIB 200 MILLIGRAM(S): 200 CAPSULE ORAL at 08:25

## 2017-08-27 RX ADMIN — PANTOPRAZOLE SODIUM 40 MILLIGRAM(S): 20 TABLET, DELAYED RELEASE ORAL at 05:52

## 2017-08-27 RX ADMIN — Medication 40 MILLIEQUIVALENT(S): at 13:01

## 2017-08-27 RX ADMIN — Medication 2 SPRAY(S): at 08:26

## 2017-08-27 RX ADMIN — MONTELUKAST 10 MILLIGRAM(S): 4 TABLET, CHEWABLE ORAL at 17:41

## 2017-08-27 RX ADMIN — BUDESONIDE AND FORMOTEROL FUMARATE DIHYDRATE 2 PUFF(S): 160; 4.5 AEROSOL RESPIRATORY (INHALATION) at 17:44

## 2017-08-27 RX ADMIN — CELECOXIB 200 MILLIGRAM(S): 200 CAPSULE ORAL at 09:00

## 2017-08-27 RX ADMIN — Medication 100 MILLIGRAM(S): at 13:01

## 2017-08-27 RX ADMIN — TRAMADOL HYDROCHLORIDE 100 MILLIGRAM(S): 50 TABLET ORAL at 22:30

## 2017-08-27 RX ADMIN — ATENOLOL 50 MILLIGRAM(S): 25 TABLET ORAL at 05:53

## 2017-08-27 RX ADMIN — Medication 1000 MILLIGRAM(S): at 21:42

## 2017-08-27 RX ADMIN — BUDESONIDE AND FORMOTEROL FUMARATE DIHYDRATE 2 PUFF(S): 160; 4.5 AEROSOL RESPIRATORY (INHALATION) at 08:00

## 2017-08-27 NOTE — PROGRESS NOTE ADULT - SUBJECTIVE AND OBJECTIVE BOX
Patient is a 72y old  Female who presents with a chief complaint of Knee Pain (24 Aug 2017 10:26)      INTERVAL HPI/OVERNIGHT EVENTS: no acute events overnight. pt is active with PT , seen and examined at the bed side , no acute complaints .   stage 2 left TKR in am.     MEDICATIONS  (STANDING):  pantoprazole    Tablet 40 milliGRAM(s) Oral before breakfast  senna 2 Tablet(s) Oral at bedtime  docusate sodium 100 milliGRAM(s) Oral three times a day  celecoxib 200 milliGRAM(s) Oral two times a day after meals  acetaminophen   Tablet. 1000 milliGRAM(s) Oral every 8 hours  topiramate 50 milliGRAM(s) Oral two times a day  buDESOnide 160 MICROgram(s)/formoterol 4.5 MICROgram(s) Inhaler 2 Puff(s) Inhalation two times a day  atorvastatin 10 milliGRAM(s) Oral at bedtime  ATENolol  Tablet 50 milliGRAM(s) Oral two times a day  montelukast 10 milliGRAM(s) Oral daily  fluticasone propionate 50 MICROgram(s)/spray Nasal Spray 2 Spray(s) Both Nostrils daily    MEDICATIONS  (PRN):  aluminum hydroxide/magnesium hydroxide/simethicone Suspension 30 milliLiter(s) Oral four times a day PRN Indigestion  ondansetron Injectable 4 milliGRAM(s) IV Push every 6 hours PRN Nausea and/or Vomiting  magnesium hydroxide Suspension 30 milliLiter(s) Oral daily PRN Constipation  polyethylene glycol 3350 17 Gram(s) Oral daily PRN Constipation  bisacodyl Suppository 10 milliGRAM(s) Rectal daily PRN If no bowel movement by postoperative day #2  HYDROmorphone  Injectable 0.5 milliGRAM(s) IV Push every 3 hours PRN Severe Pain (7 - 10)  ALBUTerol    90 MICROgram(s) HFA Inhaler 2 Puff(s) Inhalation every 6 hours PRN Shortness of Breath and/or Wheezing  loratadine 10 milliGRAM(s) Oral daily PRN allergies  traMADol 50 milliGRAM(s) Oral every 4 hours PRN Mild Pain (1 - 3)  traMADol 100 milliGRAM(s) Oral every 4 hours PRN Moderate Pain (4 - 6)      Allergies    No Known Allergies    Intolerances    Demerol HCl (Nausea; Vomiting)      REVIEW OF SYSTEMS:  CONSTITUTIONAL: No fever, weight loss, or fatigue  EYES: No eye pain, visual disturbances, or discharge  e  RESPIRATORY: No cough, wheezing, chills or hemoptysis; No shortness of breath  CARDIOVASCULAR: No chest pain, palpitations, dizziness, or leg swelling  GASTROINTESTINAL: No abdominal or epigastric pain. No nausea, vomiting, or hematemesis; No diarrhea or constipation. No melena or hematochezia.  GENITOURINARY: No dysuria, frequency, hematuria, or incontinence  NEUROLOGICAL: No headaches, memory loss, loss of strength, numbness, or tremors    ENDOCRINE: No heat or cold intolerance; No hair loss; No polydipsia or polyuria  MUSCULOSKELETAL: No joint pain or swelling; No muscle, back, or extremity pain      Vital Signs Last 24 Hrs  T(C): 36.4 (27 Aug 2017 07:58), Max: 36.7 (26 Aug 2017 16:00)  T(F): 97.6 (27 Aug 2017 07:58), Max: 98.1 (26 Aug 2017 16:00)  HR: 60 (27 Aug 2017 07:58) (60 - 72)  BP: 115/74 (27 Aug 2017 07:58) (96/58 - 130/77)  BP(mean): --  RR: 14 (27 Aug 2017 05:57) (14 - 17)  SpO2: 95% (27 Aug 2017 07:58) (93% - 95%)    PHYSICAL EXAM:  GENERAL: NAD, elderly female lying in the bed not in acute distress.   NECK: Supple, No JVD, Normal thyroid  NERVOUS SYSTEM:  Alert & Oriented X3, Good concentration; Motor Strength 5/5 B/L upper and lower extremities; DTRs 2+ intact and symmetric  CHEST/LUNG: Clear to auscultation bilaterally; No rales, rhonchi, wheezing, or rubs  HEART: Regular rate and rhythm; No murmurs, rubs, or gallops  ABDOMEN: Soft, Nontender, Nondistended; Bowel sounds present  EXTREMITIES:  2+ Peripheral Pulses, No clubbing, cyanosis, or edema      LABS:                        11.0   5.1   )-----------( 161      ( 27 Aug 2017 06:54 )             33.5     27 Aug 2017 06:54    141    |  108    |  15     ----------------------------<  104    3.4     |  28     |  0.78     Ca    8.7        27 Aug 2017 06:54      PT/INR - ( 27 Aug 2017 11:14 )   PT: 11.7 sec;   INR: 1.07 ratio                 CAPILLARY BLOOD GLUCOSE          RADIOLOGY & ADDITIONAL TESTS:    Imaging Personally Reviewed:  [ ] YES     Consultant(s) Notes Reviewed:      Care Discussed with Consultants/Other Providers:

## 2017-08-27 NOTE — PROGRESS NOTE ADULT - SUBJECTIVE AND OBJECTIVE BOX
Procedure: Right TKR  POD #: 4  S: Pt without complaints. No SOB,CP, N/V. Tolerated Diet well.   Pain comfortable (3/10 ) on Interval Rx.   Had [+BM], + flatus, No abdominal pain.   PT activity yesterday:  Walked with walker, sat in chair    Pain Rx:  celecoxib 200 milliGRAM(s) Oral two times a day after meals  HYDROmorphone  Injectable 0.5 milliGRAM(s) IV Push every 3 hours PRN  acetaminophen   Tablet. 1000 milliGRAM(s) Oral every 8 hours  topiramate 50 milliGRAM(s) Oral two times a day  traMADol 50 milliGRAM(s) Oral every 4 hours PRN  traMADol 100 milliGRAM(s) Oral every 4 hours PRN    O: General: On exam, No Apparent Distress  Vital Signs Last 24 Hrs  T(C): 36.4 (27 Aug 2017 07:58), Max: 36.7 (26 Aug 2017 16:00)  T(F): 97.6 (27 Aug 2017 07:58), Max: 98.1 (26 Aug 2017 16:00)  HR: 60 (27 Aug 2017 07:58) (60 - 72)  BP: 115/74 (27 Aug 2017 07:58) (96/58 - 130/77)  RR: 14 (27 Aug 2017 05:57) (14 - 17)  SpO2: 95% (27 Aug 2017 07:58) (93% - 95%)    Lungs: BS clear bilat.  [Abdomen]: + BS, soft , benign exam     Ext(Knee): Right Knee [Incision] clean, dry, & intact; Prineo intact; no  dehiscence; No  cellulitis; Min effusion- soft,]. Minimal soft tissue swelling; No fluctuance, No crepitus].  ROM: Extension 0 deg.; Flexion = 85 deg with soft endpoint  Neurologic:  Has sensation over feet & toes bilat. Full AROM bilat feet & toes. EHL/AT = 5/5  Vascular: Feet toes warm, pink. DP = 2+. No calf tenderness bilat..  Urine Out Voided  VTEP: On Bilat. Venodynes + Lovenox ( ended this AM )    Hospitalist input noted.    CBC:                      11.0   5.1   )-----------( 161      ( 27 Aug 2017 06:54 )             33.5     Chem:  08-27    141  |  108  |  15  ----------------------------<  104<H>  3.4<L>   |  28  |  0.78    Ca    8.7      27 Aug 2017 06:54      Primary Orthopedic Assessment:  • Stable from Orthopedic perspective  • Neuro motor exam stable  • Labs: CBC stable; Chem with min hypokalemia      Plan:   • Continue:  PT/OT/WBAT with assistance of a walker/Ice to knee/ Knee ROM         Incentive spirometry encouraged   • Continue DVT prophylaxis as prescribed, including use of compression devices and ankle pumps  • Continue Pain Rx  • Plans per Medicine / Anesthesia - Added KDUR 1 dose today per Dr. Mark - Hospitalist  • Planning for staged Side 2 Left TKR in AM

## 2017-08-28 ENCOUNTER — RESULT REVIEW (OUTPATIENT)
Age: 72
End: 2017-08-28

## 2017-08-28 ENCOUNTER — APPOINTMENT (OUTPATIENT)
Dept: ORTHOPEDIC SURGERY | Facility: HOSPITAL | Age: 72
End: 2017-08-28

## 2017-08-28 LAB
ANION GAP SERPL CALC-SCNC: 6 MMOL/L — SIGNIFICANT CHANGE UP (ref 5–17)
ANION GAP SERPL CALC-SCNC: 8 MMOL/L — SIGNIFICANT CHANGE UP (ref 5–17)
BUN SERPL-MCNC: 17 MG/DL — SIGNIFICANT CHANGE UP (ref 7–23)
BUN SERPL-MCNC: 19 MG/DL — SIGNIFICANT CHANGE UP (ref 7–23)
CALCIUM SERPL-MCNC: 9 MG/DL — SIGNIFICANT CHANGE UP (ref 8.4–10.5)
CALCIUM SERPL-MCNC: 9.2 MG/DL — SIGNIFICANT CHANGE UP (ref 8.4–10.5)
CHLORIDE SERPL-SCNC: 109 MMOL/L — HIGH (ref 96–108)
CHLORIDE SERPL-SCNC: 109 MMOL/L — HIGH (ref 96–108)
CO2 SERPL-SCNC: 24 MMOL/L — SIGNIFICANT CHANGE UP (ref 22–31)
CO2 SERPL-SCNC: 26 MMOL/L — SIGNIFICANT CHANGE UP (ref 22–31)
CREAT SERPL-MCNC: 0.72 MG/DL — SIGNIFICANT CHANGE UP (ref 0.5–1.3)
CREAT SERPL-MCNC: 0.75 MG/DL — SIGNIFICANT CHANGE UP (ref 0.5–1.3)
GLUCOSE SERPL-MCNC: 109 MG/DL — HIGH (ref 70–99)
GLUCOSE SERPL-MCNC: 139 MG/DL — HIGH (ref 70–99)
HCT VFR BLD CALC: 32.2 % — LOW (ref 34.5–45)
HCT VFR BLD CALC: 32.4 % — LOW (ref 34.5–45)
HGB BLD-MCNC: 10.6 G/DL — LOW (ref 11.5–15.5)
HGB BLD-MCNC: 11 G/DL — LOW (ref 11.5–15.5)
MCHC RBC-ENTMCNC: 32.1 PG — SIGNIFICANT CHANGE UP (ref 27–34)
MCHC RBC-ENTMCNC: 33.9 GM/DL — SIGNIFICANT CHANGE UP (ref 32–36)
MCV RBC AUTO: 94.6 FL — SIGNIFICANT CHANGE UP (ref 80–100)
PLATELET # BLD AUTO: 167 K/UL — SIGNIFICANT CHANGE UP (ref 150–400)
POTASSIUM SERPL-MCNC: 4.1 MMOL/L — SIGNIFICANT CHANGE UP (ref 3.5–5.3)
POTASSIUM SERPL-MCNC: 4.9 MMOL/L — SIGNIFICANT CHANGE UP (ref 3.5–5.3)
POTASSIUM SERPL-SCNC: 4.1 MMOL/L — SIGNIFICANT CHANGE UP (ref 3.5–5.3)
POTASSIUM SERPL-SCNC: 4.9 MMOL/L — SIGNIFICANT CHANGE UP (ref 3.5–5.3)
RBC # BLD: 3.43 M/UL — LOW (ref 3.8–5.2)
RBC # FLD: 12.2 % — SIGNIFICANT CHANGE UP (ref 10.3–14.5)
SODIUM SERPL-SCNC: 141 MMOL/L — SIGNIFICANT CHANGE UP (ref 135–145)
SODIUM SERPL-SCNC: 141 MMOL/L — SIGNIFICANT CHANGE UP (ref 135–145)
WBC # BLD: 4.9 K/UL — SIGNIFICANT CHANGE UP (ref 3.8–10.5)
WBC # FLD AUTO: 4.9 K/UL — SIGNIFICANT CHANGE UP (ref 3.8–10.5)

## 2017-08-28 PROCEDURE — 88311 DECALCIFY TISSUE: CPT | Mod: 26

## 2017-08-28 PROCEDURE — 99233 SBSQ HOSP IP/OBS HIGH 50: CPT

## 2017-08-28 PROCEDURE — 88305 TISSUE EXAM BY PATHOLOGIST: CPT | Mod: 26

## 2017-08-28 PROCEDURE — 27447 TOTAL KNEE ARTHROPLASTY: CPT | Mod: 58,LT

## 2017-08-28 PROCEDURE — 73562 X-RAY EXAM OF KNEE 3: CPT | Mod: 26,LT

## 2017-08-28 RX ORDER — ACETAMINOPHEN 500 MG
1000 TABLET ORAL EVERY 6 HOURS
Qty: 0 | Refills: 0 | Status: COMPLETED | OUTPATIENT
Start: 2017-08-28 | End: 2017-08-29

## 2017-08-28 RX ORDER — SODIUM CHLORIDE 9 MG/ML
1000 INJECTION, SOLUTION INTRAVENOUS
Qty: 0 | Refills: 0 | Status: DISCONTINUED | OUTPATIENT
Start: 2017-08-28 | End: 2017-08-28

## 2017-08-28 RX ORDER — TRAMADOL HYDROCHLORIDE 50 MG/1
50 TABLET ORAL
Qty: 0 | Refills: 0 | Status: DISCONTINUED | OUTPATIENT
Start: 2017-08-28 | End: 2017-08-29

## 2017-08-28 RX ORDER — BUDESONIDE AND FORMOTEROL FUMARATE DIHYDRATE 160; 4.5 UG/1; UG/1
2 AEROSOL RESPIRATORY (INHALATION)
Qty: 0 | Refills: 0 | Status: DISCONTINUED | OUTPATIENT
Start: 2017-08-28 | End: 2017-09-01

## 2017-08-28 RX ORDER — CELECOXIB 200 MG/1
200 CAPSULE ORAL
Qty: 0 | Refills: 0 | Status: DISCONTINUED | OUTPATIENT
Start: 2017-08-28 | End: 2017-09-01

## 2017-08-28 RX ORDER — DOCUSATE SODIUM 100 MG
100 CAPSULE ORAL THREE TIMES A DAY
Qty: 0 | Refills: 0 | Status: DISCONTINUED | OUTPATIENT
Start: 2017-08-28 | End: 2017-09-01

## 2017-08-28 RX ORDER — MONTELUKAST 4 MG/1
10 TABLET, CHEWABLE ORAL DAILY
Qty: 0 | Refills: 0 | Status: DISCONTINUED | OUTPATIENT
Start: 2017-08-28 | End: 2017-09-01

## 2017-08-28 RX ORDER — TRANEXAMIC ACID 100 MG/ML
1000 INJECTION, SOLUTION INTRAVENOUS ONCE
Qty: 0 | Refills: 0 | Status: COMPLETED | OUTPATIENT
Start: 2017-08-28 | End: 2017-08-28

## 2017-08-28 RX ORDER — VANCOMYCIN HCL 1 G
1000 VIAL (EA) INTRAVENOUS ONCE
Qty: 0 | Refills: 0 | Status: COMPLETED | OUTPATIENT
Start: 2017-08-28 | End: 2017-08-28

## 2017-08-28 RX ORDER — FLUOCINONIDE/EMOLLIENT BASE 0.05 %
1 CREAM (GRAM) TOPICAL
Qty: 0 | Refills: 0 | Status: DISCONTINUED | OUTPATIENT
Start: 2017-08-28 | End: 2017-09-01

## 2017-08-28 RX ORDER — APIXABAN 2.5 MG/1
2.5 TABLET, FILM COATED ORAL EVERY 12 HOURS
Qty: 0 | Refills: 0 | Status: DISCONTINUED | OUTPATIENT
Start: 2017-08-29 | End: 2017-09-01

## 2017-08-28 RX ORDER — ONDANSETRON 8 MG/1
4 TABLET, FILM COATED ORAL EVERY 6 HOURS
Qty: 0 | Refills: 0 | Status: DISCONTINUED | OUTPATIENT
Start: 2017-08-28 | End: 2017-09-01

## 2017-08-28 RX ORDER — FENTANYL CITRATE 50 UG/ML
25 INJECTION INTRAVENOUS
Qty: 0 | Refills: 0 | Status: DISCONTINUED | OUTPATIENT
Start: 2017-08-28 | End: 2017-08-28

## 2017-08-28 RX ORDER — SODIUM CHLORIDE 9 MG/ML
1000 INJECTION, SOLUTION INTRAVENOUS
Qty: 0 | Refills: 0 | Status: DISCONTINUED | OUTPATIENT
Start: 2017-08-28 | End: 2017-09-01

## 2017-08-28 RX ORDER — POLYETHYLENE GLYCOL 3350 17 G/17G
17 POWDER, FOR SOLUTION ORAL DAILY
Qty: 0 | Refills: 0 | Status: DISCONTINUED | OUTPATIENT
Start: 2017-08-28 | End: 2017-09-01

## 2017-08-28 RX ORDER — TOPIRAMATE 25 MG
50 TABLET ORAL
Qty: 0 | Refills: 0 | Status: DISCONTINUED | OUTPATIENT
Start: 2017-08-28 | End: 2017-09-01

## 2017-08-28 RX ORDER — ATENOLOL 25 MG/1
50 TABLET ORAL
Qty: 0 | Refills: 0 | Status: DISCONTINUED | OUTPATIENT
Start: 2017-08-28 | End: 2017-09-01

## 2017-08-28 RX ORDER — ACETAMINOPHEN 500 MG
1000 TABLET ORAL EVERY 8 HOURS
Qty: 0 | Refills: 0 | Status: DISCONTINUED | OUTPATIENT
Start: 2017-08-29 | End: 2017-09-01

## 2017-08-28 RX ORDER — ATORVASTATIN CALCIUM 80 MG/1
40 TABLET, FILM COATED ORAL AT BEDTIME
Qty: 0 | Refills: 0 | Status: DISCONTINUED | OUTPATIENT
Start: 2017-08-28 | End: 2017-09-01

## 2017-08-28 RX ORDER — MAGNESIUM HYDROXIDE 400 MG/1
30 TABLET, CHEWABLE ORAL DAILY
Qty: 0 | Refills: 0 | Status: DISCONTINUED | OUTPATIENT
Start: 2017-08-28 | End: 2017-09-01

## 2017-08-28 RX ORDER — TRAMADOL HYDROCHLORIDE 50 MG/1
100 TABLET ORAL
Qty: 0 | Refills: 0 | Status: DISCONTINUED | OUTPATIENT
Start: 2017-08-28 | End: 2017-08-29

## 2017-08-28 RX ORDER — ASPIRIN/CALCIUM CARB/MAGNESIUM 324 MG
81 TABLET ORAL DAILY
Qty: 0 | Refills: 0 | Status: DISCONTINUED | OUTPATIENT
Start: 2017-08-28 | End: 2017-09-01

## 2017-08-28 RX ORDER — SENNA PLUS 8.6 MG/1
2 TABLET ORAL AT BEDTIME
Qty: 0 | Refills: 0 | Status: DISCONTINUED | OUTPATIENT
Start: 2017-08-28 | End: 2017-09-01

## 2017-08-28 RX ORDER — PANTOPRAZOLE SODIUM 20 MG/1
40 TABLET, DELAYED RELEASE ORAL
Qty: 0 | Refills: 0 | Status: DISCONTINUED | OUTPATIENT
Start: 2017-08-28 | End: 2017-09-01

## 2017-08-28 RX ORDER — HYDROMORPHONE HYDROCHLORIDE 2 MG/ML
0.5 INJECTION INTRAMUSCULAR; INTRAVENOUS; SUBCUTANEOUS
Qty: 0 | Refills: 0 | Status: DISCONTINUED | OUTPATIENT
Start: 2017-08-28 | End: 2017-09-01

## 2017-08-28 RX ORDER — LORATADINE 10 MG/1
10 TABLET ORAL DAILY
Qty: 0 | Refills: 0 | Status: DISCONTINUED | OUTPATIENT
Start: 2017-08-28 | End: 2017-09-01

## 2017-08-28 RX ADMIN — SODIUM CHLORIDE 75 MILLILITER(S): 9 INJECTION, SOLUTION INTRAVENOUS at 13:44

## 2017-08-28 RX ADMIN — ALBUTEROL 2 PUFF(S): 90 AEROSOL, METERED ORAL at 16:43

## 2017-08-28 RX ADMIN — ATORVASTATIN CALCIUM 40 MILLIGRAM(S): 80 TABLET, FILM COATED ORAL at 22:25

## 2017-08-28 RX ADMIN — TRAMADOL HYDROCHLORIDE 100 MILLIGRAM(S): 50 TABLET ORAL at 20:30

## 2017-08-28 RX ADMIN — Medication 50 MILLIGRAM(S): at 05:33

## 2017-08-28 RX ADMIN — PANTOPRAZOLE SODIUM 40 MILLIGRAM(S): 20 TABLET, DELAYED RELEASE ORAL at 05:33

## 2017-08-28 RX ADMIN — TRAMADOL HYDROCHLORIDE 100 MILLIGRAM(S): 50 TABLET ORAL at 13:03

## 2017-08-28 RX ADMIN — SODIUM CHLORIDE 75 MILLILITER(S): 9 INJECTION, SOLUTION INTRAVENOUS at 11:23

## 2017-08-28 RX ADMIN — CELECOXIB 200 MILLIGRAM(S): 200 CAPSULE ORAL at 16:45

## 2017-08-28 RX ADMIN — TRAMADOL HYDROCHLORIDE 100 MILLIGRAM(S): 50 TABLET ORAL at 13:40

## 2017-08-28 RX ADMIN — SENNA PLUS 2 TABLET(S): 8.6 TABLET ORAL at 22:25

## 2017-08-28 RX ADMIN — ATENOLOL 50 MILLIGRAM(S): 25 TABLET ORAL at 05:33

## 2017-08-28 RX ADMIN — Medication 1000 MILLIGRAM(S): at 16:38

## 2017-08-28 RX ADMIN — TRAMADOL HYDROCHLORIDE 100 MILLIGRAM(S): 50 TABLET ORAL at 18:00

## 2017-08-28 RX ADMIN — Medication 100 MILLIGRAM(S): at 16:42

## 2017-08-28 RX ADMIN — ATENOLOL 50 MILLIGRAM(S): 25 TABLET ORAL at 17:16

## 2017-08-28 RX ADMIN — HYDROMORPHONE HYDROCHLORIDE 0.5 MILLIGRAM(S): 2 INJECTION INTRAMUSCULAR; INTRAVENOUS; SUBCUTANEOUS at 13:40

## 2017-08-28 RX ADMIN — Medication 50 MILLIGRAM(S): at 17:16

## 2017-08-28 RX ADMIN — CELECOXIB 200 MILLIGRAM(S): 200 CAPSULE ORAL at 16:42

## 2017-08-28 RX ADMIN — Medication 400 MILLIGRAM(S): at 22:26

## 2017-08-28 RX ADMIN — TRAMADOL HYDROCHLORIDE 100 MILLIGRAM(S): 50 TABLET ORAL at 17:19

## 2017-08-28 RX ADMIN — Medication 100 MILLIGRAM(S): at 22:26

## 2017-08-28 RX ADMIN — TRAMADOL HYDROCHLORIDE 100 MILLIGRAM(S): 50 TABLET ORAL at 19:59

## 2017-08-28 RX ADMIN — HYDROMORPHONE HYDROCHLORIDE 0.5 MILLIGRAM(S): 2 INJECTION INTRAMUSCULAR; INTRAVENOUS; SUBCUTANEOUS at 12:59

## 2017-08-28 RX ADMIN — MONTELUKAST 10 MILLIGRAM(S): 4 TABLET, CHEWABLE ORAL at 17:16

## 2017-08-28 RX ADMIN — Medication 400 MILLIGRAM(S): at 16:00

## 2017-08-28 RX ADMIN — BUDESONIDE AND FORMOTEROL FUMARATE DIHYDRATE 2 PUFF(S): 160; 4.5 AEROSOL RESPIRATORY (INHALATION) at 05:34

## 2017-08-28 RX ADMIN — Medication 250 MILLIGRAM(S): at 19:55

## 2017-08-28 RX ADMIN — Medication 2 SPRAY(S): at 16:42

## 2017-08-28 NOTE — OCCUPATIONAL THERAPY INITIAL EVALUATION ADULT - BED MOBILITY TRAINING, PT EVAL
Pt will perform bed mobility with supervision within 2-3 sessions.
Pt will perform bed mobility with supervision within 2-3 sessions.

## 2017-08-28 NOTE — OCCUPATIONAL THERAPY INITIAL EVALUATION ADULT - ADDITIONAL COMMENTS
Pt lives with spouse in  a private home with 6 LISA + railing. Pt has a tub. No DME/AE in place at this time

## 2017-08-28 NOTE — BRIEF OPERATIVE NOTE - POST-OP DX
Primary osteoarthritis of right knee  08/23/2017    Active  Arslan Huff
Primary localized osteoarthritis of left knee  08/28/2017    Active  Tc York  Primary osteoarthritis of right knee  08/23/2017    Active  Arslan Huff

## 2017-08-28 NOTE — PROGRESS NOTE ADULT - SUBJECTIVE AND OBJECTIVE BOX
Patient is a 72y old  Female who presents with a chief complaint of Knee Pain (24 Aug 2017 10:26)        HPI:      SUBJECTIVE & OBJECTIVE: Pt seen and examined at bedside.     PHYSICAL EXAM:  T(C): 36.8 (08-28-17 @ 15:10), Max: 36.8 (08-28-17 @ 02:34)  HR: 69 (08-28-17 @ 15:10) (62 - 79)  BP: 119/76 (08-28-17 @ 15:10) (86/59 - 133/59)  RR: 16 (08-28-17 @ 15:10) (13 - 19)  SpO2: 94% (08-28-17 @ 15:10) (94% - 199%)  Wt(kg): -- Height (cm): 147.32 (08-28 @ 07:23)  Weight (kg): 72.6 (08-28 @ 07:23)  BMI (kg/m2): 33.5 (08-28 @ 07:23)  BSA (m2): 1.66 (08-28 @ 07:23)  GENERAL: NAD, well-groomed, well-developed  HEAD:  Atraumatic, Normocephalic  EYES: EOMI, PERRLA, conjunctiva and sclera clear  ENMT: Moist mucous membranes  NECK: Supple, No JVD  NERVOUS SYSTEM:  Alert & Oriented X3, Motor Strength 5/5 B/L upper and lower extremities; DTRs 2+ intact and symmetric  CHEST/LUNG: Clear to auscultation bilaterally; No rales, rhonchi, wheezing, or rubs  HEART: Regular rate and rhythm; No murmurs, rubs, or gallops  ABDOMEN: Soft, Nontender, Nondistended; Bowel sounds present  EXTREMITIES:  2+ Peripheral Pulses, No clubbing, cyanosis, or edema        MEDICATIONS  (STANDING):  fluticasone propionate 50 MICROgram(s)/spray Nasal Spray 2 Spray(s) Both Nostrils daily  lactated ringers. 1000 milliLiter(s) (75 mL/Hr) IV Continuous <Continuous>  pantoprazole    Tablet 40 milliGRAM(s) Oral before breakfast  senna 2 Tablet(s) Oral at bedtime  docusate sodium 100 milliGRAM(s) Oral three times a day  acetaminophen  IVPB. 1000 milliGRAM(s) IV Intermittent every 6 hours  celecoxib 200 milliGRAM(s) Oral two times a day with meals  vancomycin  IVPB 1000 milliGRAM(s) IV Intermittent once  aspirin  chewable 81 milliGRAM(s) Oral daily  topiramate 50 milliGRAM(s) Oral two times a day  atorvastatin 40 milliGRAM(s) Oral at bedtime  ATENolol  Tablet 50 milliGRAM(s) Oral two times a day  buDESOnide 160 MICROgram(s)/formoterol 4.5 MICROgram(s) Inhaler 2 Puff(s) Inhalation two times a day  fluocinonide 0.05% Cream 1 Application(s) Topical two times a day  montelukast 10 milliGRAM(s) Oral daily    MEDICATIONS  (PRN):  aluminum hydroxide/magnesium hydroxide/simethicone Suspension 30 milliLiter(s) Oral four times a day PRN Indigestion  bisacodyl Suppository 10 milliGRAM(s) Rectal daily PRN If no bowel movement by postoperative day #2  ALBUTerol    90 MICROgram(s) HFA Inhaler 2 Puff(s) Inhalation every 6 hours PRN Shortness of Breath and/or Wheezing  aluminum hydroxide/magnesium hydroxide/simethicone Suspension 30 milliLiter(s) Oral four times a day PRN Indigestion  ondansetron Injectable 4 milliGRAM(s) IV Push every 6 hours PRN Nausea and/or Vomiting  magnesium hydroxide Suspension 30 milliLiter(s) Oral daily PRN Constipation  polyethylene glycol 3350 17 Gram(s) Oral daily PRN Constipation  traMADol 50 milliGRAM(s) Oral every 3 hours PRN Mild Pain (1 - 3)  traMADol 100 milliGRAM(s) Oral every 3 hours PRN Moderate Pain (4 - 6)  HYDROmorphone  Injectable 0.5 milliGRAM(s) IV Push every 3 hours PRN Severe Pain (7 - 10)  loratadine 10 milliGRAM(s) Oral daily PRN allergies      LABS:                        11.0   4.9   )-----------( 167      ( 28 Aug 2017 06:39 )             32.4     08-28    141  |  109<H>  |  19  ----------------------------<  109<H>  4.1   |  24  |  0.75    Ca    9.0      28 Aug 2017 06:39      PT/INR - ( 27 Aug 2017 11:14 )   PT: 11.7 sec;   INR: 1.07 ratio               CAPILLARY BLOOD GLUCOSE          CAPILLARY BLOOD GLUCOSE        CAPILLARY BLOOD GLUCOSE                RECENT CULTURES:      RADIOLOGY & ADDITIONAL TESTS:                        DVT/GI ppx  Discussed with pt @ bedside

## 2017-08-28 NOTE — OCCUPATIONAL THERAPY INITIAL EVALUATION ADULT - ADL RETRAINING, OT EVAL
Patient will dress lower body with set-up , AE as needed within 2-3 sessions.
Patient will dress lower body with minimal assistance, AE as needed within 2-3 sessions.

## 2017-08-28 NOTE — BRIEF OPERATIVE NOTE - PROCEDURE
Right knee replacement  08/23/2017    Active  BLORUSSO
Knee replacement  08/28/2017    Active  Tc York

## 2017-08-28 NOTE — BRIEF OPERATIVE NOTE - PRE-OP DX
Primary osteoarthritis of left knee  08/23/2017    Active  Arslan Huff
Primary localized osteoarthritis of left knee  08/28/2017    Active  Tc York  Primary osteoarthritis of left knee  08/23/2017    Active  Arslan Huff

## 2017-08-28 NOTE — OCCUPATIONAL THERAPY INITIAL EVALUATION ADULT - PLANNED THERAPY INTERVENTIONS, OT EVAL
ADL retraining/transfer training/bed mobility training
transfer training/ADL retraining/bed mobility training

## 2017-08-28 NOTE — OCCUPATIONAL THERAPY INITIAL EVALUATION ADULT - NS ASR FOLLOW COMMAND OT EVAL
Pt educated regarding fall prevention in hospital and recommendation to use call bell/ask for assistance with all ADL's/transfers etc./100% of the time
100% of the time

## 2017-08-28 NOTE — OCCUPATIONAL THERAPY INITIAL EVALUATION ADULT - TRANSFER TRAINING, PT EVAL
Patient will transfer to toilet with DME as needed with supervision within 2-3 sessions.
Patient will transfer to toilet with DME as needed with supervision within 2-3 sessions.

## 2017-08-28 NOTE — PHYSICAL THERAPY INITIAL EVALUATION ADULT - GAIT DEVIATIONS NOTED, PT EVAL
decreased weight-shifting ability/decreased step length/decreased janneth/decreased stride length/decreased velocity of limb motion

## 2017-08-28 NOTE — PROGRESS NOTE ADULT - SUBJECTIVE AND OBJECTIVE BOX
Ortho PA - Post Op Check - s/p Left TKR with spinal and adductor canal block      Pt alert and comfortable with no complaints, pain controlled  Denies nausea     Vital Signs Last 24 Hrs  T(C): 36.8 (08-28-17 @ 12:48), Max: 36.8 (08-28-17 @ 10:54)  T(F): 98.2 (08-28-17 @ 12:48), Max: 98.2 (08-28-17 @ 10:54)  HR: 70 (08-28-17 @ 12:48) (63 - 79)  BP: 104/65 (08-28-17 @ 12:48) (86/59 - 122/74)  BP(mean): --  RR: 16 (08-28-17 @ 12:48) (14 - 19)  SpO2: 94% (08-28-17 @ 12:48) (94% - 100%)  I&O's Detail    27 Aug 2017 07:01  -  28 Aug 2017 07:00  --------------------------------------------------------  IN:  Total IN: 0 mL    OUT:    Voided: 2 mL      * No drains.  Total OUT: 2 mL    Total NET: -2 mL      28 Aug 2017 07:01  -  28 Aug 2017 13:29  --------------------------------------------------------  IN:    lactated ringers.: 2000 mL  Total IN: 2000 mL    OUT:    Estimated Blood Loss: 100 mL  Total OUT: 100 mL    Total NET: 1900 mL        I&O's Summary    27 Aug 2017 07:01  -  28 Aug 2017 07:00  --------------------------------------------------------  IN: 0 mL / OUT: 2 mL / NET: -2 mL    28 Aug 2017 07:01  -  28 Aug 2017 13:29  --------------------------------------------------------  IN: 2000 mL / OUT: 100 mL / NET: 1900 mL                                11.0   4.9   )-----------( 167      ( 28 Aug 2017 06:39 )             32.4        08-28    141  |  109<H>  |  19  ----------------------------<  109<H>  4.1   |  24  |  0.75    Ca    9.0      28 Aug 2017 06:39      MEDICATIONS:aluminum hydroxide/magnesium hydroxide/simethicone Suspension 30 milliLiter(s) Oral four times a day PRN  bisacodyl Suppository 10 milliGRAM(s) Rectal daily PRN  ALBUTerol    90 MICROgram(s) HFA Inhaler 2 Puff(s) Inhalation every 6 hours PRN  fluticasone propionate 50 MICROgram(s)/spray Nasal Spray 2 Spray(s) Both Nostrils daily  lactated ringers. 1000 milliLiter(s) IV Continuous <Continuous>  aluminum hydroxide/magnesium hydroxide/simethicone Suspension 30 milliLiter(s) Oral four times a day PRN  ondansetron Injectable 4 milliGRAM(s) IV Push every 6 hours PRN  pantoprazole    Tablet 40 milliGRAM(s) Oral before breakfast  magnesium hydroxide Suspension 30 milliLiter(s) Oral daily PRN  polyethylene glycol 3350 17 Gram(s) Oral daily PRN  senna 2 Tablet(s) Oral at bedtime  docusate sodium 100 milliGRAM(s) Oral three times a day  traMADol 50 milliGRAM(s) Oral every 3 hours PRN  traMADol 100 milliGRAM(s) Oral every 3 hours PRN  HYDROmorphone  Injectable 0.5 milliGRAM(s) IV Push every 3 hours PRN  acetaminophen  IVPB. 1000 milliGRAM(s) IV Intermittent every 6 hours  celecoxib 200 milliGRAM(s) Oral two times a day with meals  vancomycin  IVPB 1000 milliGRAM(s) IV Intermittent once  aspirin  chewable 81 milliGRAM(s) Oral daily  topiramate 50 milliGRAM(s) Oral two times a day  loratadine 10 milliGRAM(s) Oral daily PRN  atorvastatin 40 milliGRAM(s) Oral at bedtime  ATENolol  Tablet 50 milliGRAM(s) Oral two times a day  buDESOnide 160 MICROgram(s)/formoterol 4.5 MICROgram(s) Inhaler 2 Puff(s) Inhalation two times a day  fluocinonide 0.05% Cream 1 Application(s) Topical two times a day  montelukast 10 milliGRAM(s) Oral daily    Anticoagulation:  aspirin  chewable 81 milliGRAM(s) Oral daily      Antibiotics:   vancomycin  IVPB 1000 milliGRAM(s) IV Intermittent once, one more dose post-op, then complete.      Pain medications:   ondansetron Injectable 4 milliGRAM(s) IV Push every 6 hours PRN  traMADol 50 milliGRAM(s) Oral every 3 hours PRN  traMADol 100 milliGRAM(s) Oral every 3 hours PRN  HYDROmorphone  Injectable 0.5 milliGRAM(s) IV Push every 3 hours PRN  acetaminophen  IVPB. 1000 milliGRAM(s) IV Intermittent every 6 hours  celecoxib 200 milliGRAM(s) Oral two times a day with meals  topiramate 50 milliGRAM(s) Oral two times a day          PE:  Left Knee-primary surgical bandage dry and intact.  Feet mobile and sensate. DP pulse palpable 1+. *EHLs/ant.tibs. 5/5  PAS on LE's.  Calves soft and nontender.    A/P: Ortho stable post-op  - Continue post-op orders; pain management with prn Tramadol and scheduled Celebrex and acetaminophen  - Check labs today and in A.M.  - DVT prevention with Eliquis 2.5mg po every 12 hours for 12 days starting tomorrow, then additional 4 weeks with Ecotrin 162mg po every 12 hours.  - PT /OT for OOB, full WBAT  - Medical consult with Dr. Farmer.  -Discharge planning for Rehab in 2 -3 days.  -Will continue to monitor closely with attendings.

## 2017-08-29 LAB
ANION GAP SERPL CALC-SCNC: 6 MMOL/L — SIGNIFICANT CHANGE UP (ref 5–17)
BUN SERPL-MCNC: 14 MG/DL — SIGNIFICANT CHANGE UP (ref 7–23)
CALCIUM SERPL-MCNC: 8.8 MG/DL — SIGNIFICANT CHANGE UP (ref 8.4–10.5)
CHLORIDE SERPL-SCNC: 108 MMOL/L — SIGNIFICANT CHANGE UP (ref 96–108)
CO2 SERPL-SCNC: 26 MMOL/L — SIGNIFICANT CHANGE UP (ref 22–31)
CREAT SERPL-MCNC: 0.79 MG/DL — SIGNIFICANT CHANGE UP (ref 0.5–1.3)
GLUCOSE SERPL-MCNC: 112 MG/DL — HIGH (ref 70–99)
HCT VFR BLD CALC: 29.8 % — LOW (ref 34.5–45)
HGB BLD-MCNC: 9.8 G/DL — LOW (ref 11.5–15.5)
MCHC RBC-ENTMCNC: 30.9 PG — SIGNIFICANT CHANGE UP (ref 27–34)
MCHC RBC-ENTMCNC: 32.8 GM/DL — SIGNIFICANT CHANGE UP (ref 32–36)
MCV RBC AUTO: 94.3 FL — SIGNIFICANT CHANGE UP (ref 80–100)
PLATELET # BLD AUTO: 165 K/UL — SIGNIFICANT CHANGE UP (ref 150–400)
POTASSIUM SERPL-MCNC: 4.6 MMOL/L — SIGNIFICANT CHANGE UP (ref 3.5–5.3)
POTASSIUM SERPL-SCNC: 4.6 MMOL/L — SIGNIFICANT CHANGE UP (ref 3.5–5.3)
RBC # BLD: 3.16 M/UL — LOW (ref 3.8–5.2)
RBC # FLD: 12.4 % — SIGNIFICANT CHANGE UP (ref 10.3–14.5)
SODIUM SERPL-SCNC: 140 MMOL/L — SIGNIFICANT CHANGE UP (ref 135–145)
WBC # BLD: 5.5 K/UL — SIGNIFICANT CHANGE UP (ref 3.8–10.5)
WBC # FLD AUTO: 5.5 K/UL — SIGNIFICANT CHANGE UP (ref 3.8–10.5)

## 2017-08-29 PROCEDURE — 99233 SBSQ HOSP IP/OBS HIGH 50: CPT

## 2017-08-29 RX ORDER — HYDROMORPHONE HYDROCHLORIDE 2 MG/ML
4 INJECTION INTRAMUSCULAR; INTRAVENOUS; SUBCUTANEOUS EVERY 4 HOURS
Qty: 0 | Refills: 0 | Status: DISCONTINUED | OUTPATIENT
Start: 2017-08-29 | End: 2017-08-30

## 2017-08-29 RX ORDER — DIAZEPAM 5 MG
5 TABLET ORAL EVERY 8 HOURS
Qty: 0 | Refills: 0 | Status: DISCONTINUED | OUTPATIENT
Start: 2017-08-29 | End: 2017-09-01

## 2017-08-29 RX ORDER — HYDROMORPHONE HYDROCHLORIDE 2 MG/ML
2 INJECTION INTRAMUSCULAR; INTRAVENOUS; SUBCUTANEOUS EVERY 4 HOURS
Qty: 0 | Refills: 0 | Status: DISCONTINUED | OUTPATIENT
Start: 2017-08-29 | End: 2017-08-30

## 2017-08-29 RX ORDER — MORPHINE SULFATE 50 MG/1
2 CAPSULE, EXTENDED RELEASE ORAL ONCE
Qty: 0 | Refills: 0 | Status: DISCONTINUED | OUTPATIENT
Start: 2017-08-29 | End: 2017-08-29

## 2017-08-29 RX ADMIN — HYDROMORPHONE HYDROCHLORIDE 4 MILLIGRAM(S): 2 INJECTION INTRAMUSCULAR; INTRAVENOUS; SUBCUTANEOUS at 13:50

## 2017-08-29 RX ADMIN — Medication 2 SPRAY(S): at 11:41

## 2017-08-29 RX ADMIN — CELECOXIB 200 MILLIGRAM(S): 200 CAPSULE ORAL at 08:48

## 2017-08-29 RX ADMIN — PANTOPRAZOLE SODIUM 40 MILLIGRAM(S): 20 TABLET, DELAYED RELEASE ORAL at 06:40

## 2017-08-29 RX ADMIN — Medication 1000 MILLIGRAM(S): at 11:00

## 2017-08-29 RX ADMIN — Medication 5 MILLIGRAM(S): at 10:12

## 2017-08-29 RX ADMIN — BUDESONIDE AND FORMOTEROL FUMARATE DIHYDRATE 2 PUFF(S): 160; 4.5 AEROSOL RESPIRATORY (INHALATION) at 19:07

## 2017-08-29 RX ADMIN — TRAMADOL HYDROCHLORIDE 100 MILLIGRAM(S): 50 TABLET ORAL at 02:30

## 2017-08-29 RX ADMIN — CELECOXIB 200 MILLIGRAM(S): 200 CAPSULE ORAL at 18:09

## 2017-08-29 RX ADMIN — HYDROMORPHONE HYDROCHLORIDE 4 MILLIGRAM(S): 2 INJECTION INTRAMUSCULAR; INTRAVENOUS; SUBCUTANEOUS at 21:11

## 2017-08-29 RX ADMIN — TRAMADOL HYDROCHLORIDE 100 MILLIGRAM(S): 50 TABLET ORAL at 06:30

## 2017-08-29 RX ADMIN — Medication 50 MILLIGRAM(S): at 05:52

## 2017-08-29 RX ADMIN — MORPHINE SULFATE 2 MILLIGRAM(S): 50 CAPSULE, EXTENDED RELEASE ORAL at 10:42

## 2017-08-29 RX ADMIN — Medication 100 MILLIGRAM(S): at 21:11

## 2017-08-29 RX ADMIN — MORPHINE SULFATE 2 MILLIGRAM(S): 50 CAPSULE, EXTENDED RELEASE ORAL at 11:15

## 2017-08-29 RX ADMIN — ATORVASTATIN CALCIUM 40 MILLIGRAM(S): 80 TABLET, FILM COATED ORAL at 21:11

## 2017-08-29 RX ADMIN — Medication 1000 MILLIGRAM(S): at 18:09

## 2017-08-29 RX ADMIN — HYDROMORPHONE HYDROCHLORIDE 2 MILLIGRAM(S): 2 INJECTION INTRAMUSCULAR; INTRAVENOUS; SUBCUTANEOUS at 16:02

## 2017-08-29 RX ADMIN — HYDROMORPHONE HYDROCHLORIDE 0.5 MILLIGRAM(S): 2 INJECTION INTRAMUSCULAR; INTRAVENOUS; SUBCUTANEOUS at 18:15

## 2017-08-29 RX ADMIN — HYDROMORPHONE HYDROCHLORIDE 4 MILLIGRAM(S): 2 INJECTION INTRAMUSCULAR; INTRAVENOUS; SUBCUTANEOUS at 13:20

## 2017-08-29 RX ADMIN — CELECOXIB 200 MILLIGRAM(S): 200 CAPSULE ORAL at 09:35

## 2017-08-29 RX ADMIN — TRAMADOL HYDROCHLORIDE 100 MILLIGRAM(S): 50 TABLET ORAL at 11:45

## 2017-08-29 RX ADMIN — Medication 1000 MILLIGRAM(S): at 10:12

## 2017-08-29 RX ADMIN — APIXABAN 2.5 MILLIGRAM(S): 2.5 TABLET, FILM COATED ORAL at 21:11

## 2017-08-29 RX ADMIN — Medication 1000 MILLIGRAM(S): at 17:28

## 2017-08-29 RX ADMIN — BUDESONIDE AND FORMOTEROL FUMARATE DIHYDRATE 2 PUFF(S): 160; 4.5 AEROSOL RESPIRATORY (INHALATION) at 06:41

## 2017-08-29 RX ADMIN — TRAMADOL HYDROCHLORIDE 100 MILLIGRAM(S): 50 TABLET ORAL at 05:53

## 2017-08-29 RX ADMIN — MONTELUKAST 10 MILLIGRAM(S): 4 TABLET, CHEWABLE ORAL at 11:41

## 2017-08-29 RX ADMIN — TRAMADOL HYDROCHLORIDE 100 MILLIGRAM(S): 50 TABLET ORAL at 09:30

## 2017-08-29 RX ADMIN — Medication 81 MILLIGRAM(S): at 11:41

## 2017-08-29 RX ADMIN — Medication 50 MILLIGRAM(S): at 17:28

## 2017-08-29 RX ADMIN — Medication 400 MILLIGRAM(S): at 05:00

## 2017-08-29 RX ADMIN — ATENOLOL 50 MILLIGRAM(S): 25 TABLET ORAL at 17:28

## 2017-08-29 RX ADMIN — TRAMADOL HYDROCHLORIDE 100 MILLIGRAM(S): 50 TABLET ORAL at 08:54

## 2017-08-29 RX ADMIN — Medication 100 MILLIGRAM(S): at 05:52

## 2017-08-29 RX ADMIN — CELECOXIB 200 MILLIGRAM(S): 200 CAPSULE ORAL at 17:28

## 2017-08-29 RX ADMIN — HYDROMORPHONE HYDROCHLORIDE 2 MILLIGRAM(S): 2 INJECTION INTRAMUSCULAR; INTRAVENOUS; SUBCUTANEOUS at 15:32

## 2017-08-29 RX ADMIN — TRAMADOL HYDROCHLORIDE 100 MILLIGRAM(S): 50 TABLET ORAL at 12:15

## 2017-08-29 RX ADMIN — ATENOLOL 50 MILLIGRAM(S): 25 TABLET ORAL at 05:52

## 2017-08-29 RX ADMIN — HYDROMORPHONE HYDROCHLORIDE 4 MILLIGRAM(S): 2 INJECTION INTRAMUSCULAR; INTRAVENOUS; SUBCUTANEOUS at 22:00

## 2017-08-29 RX ADMIN — HYDROMORPHONE HYDROCHLORIDE 0.5 MILLIGRAM(S): 2 INJECTION INTRAMUSCULAR; INTRAVENOUS; SUBCUTANEOUS at 17:53

## 2017-08-29 RX ADMIN — APIXABAN 2.5 MILLIGRAM(S): 2.5 TABLET, FILM COATED ORAL at 08:48

## 2017-08-29 RX ADMIN — TRAMADOL HYDROCHLORIDE 100 MILLIGRAM(S): 50 TABLET ORAL at 01:50

## 2017-08-29 RX ADMIN — SENNA PLUS 2 TABLET(S): 8.6 TABLET ORAL at 21:10

## 2017-08-29 RX ADMIN — Medication 100 MILLIGRAM(S): at 13:21

## 2017-08-29 NOTE — PROGRESS NOTE ADULT - SUBJECTIVE AND OBJECTIVE BOX
ORTHOPEDIC PA PROGRESS NOTE  ALIYA MCNEILL      72y Female                                                                                                                               POD #    1 STAGE 2    STATUS POST:               Pre-Op Dx: Primary localized osteoarthritis of left knee  Primary osteoarthritis of left knee STAGE 2    Post-Op Dx:  Primary localized osteoarthritis of left knee  Primary osteoarthritis of right knee STAGE 1    Procedure: Knee replacement  Right knee replacement                                                Pain (0-10):  Pt reports 5  Current Pain Management:  [ ] PCA   [x ] Po Analgesics [ ] IM /IV Anagesics     T(F): 98.3  HR: 60  BP: 113/63  RR: 16  SpO2: 95%                         9.8    5.5   )-----------( 165      ( 29 Aug 2017 06:13 )             29.8         08-29    140  |  108  |  14  ----------------------------<  112<H>  4.6   |  26  |  0.79    Ca    8.8      29 Aug 2017 06:13      Physical Exam :    -   Dressing C/D/I.   -   Distal Neurvascular status intact grossly.   -   Warm well perfused; capillary refill <3 seconds   -   (+)EHL/FHL   -   (+) Sensation to light touch  -   (-) Calf tenderness Bilaterally    A/P: 72y Female s/p Knee replacement  Right knee replacement     -   Ortho Stable  -   Pain control, + valium for muscle spasm  -   Medicine to follow  -   DVT ppx:     [x ]SCDs     [ ] ASA     [ x] Eliquis     [ ] Lovenox  -   Weight bearing status:  WBAT [x ]        PWB    [ ]     TTWB  [ ]      NWB  [ ]   -  Dispo:     Home [ ]     Acute Rehab [ ]     [facility] [x ]     TBD [ ] ORTHOPEDIC PA PROGRESS NOTE  ALIYA MCNEILL      72y Female                                                                                                                               POD #    1 STAGE 2    STATUS POST:               Pre-Op Dx: Primary localized osteoarthritis of left knee  Primary osteoarthritis of left knee     Post-Op Dx:  Primary localized osteoarthritis of left knee  Primary osteoarthritis of right knee     Procedure: Knee replacement STAGE 2  Right knee replacement STAGE 1                                                Pain (0-10):  Pt reports 5  Current Pain Management:  [ ] PCA   [x ] Po Analgesics [ ] IM /IV Anagesics     T(F): 98.3  HR: 60  BP: 113/63  RR: 16  SpO2: 95%                         9.8    5.5   )-----------( 165      ( 29 Aug 2017 06:13 )             29.8         08-29    140  |  108  |  14  ----------------------------<  112<H>  4.6   |  26  |  0.79    Ca    8.8      29 Aug 2017 06:13      Physical Exam :    -   Right Knee Dressing C/D/I.   -   Left Hip wound with sutures open to air C/D/I  -   Distal Neurvascular status intact grossly.   -   Warm well perfused; capillary refill <3 seconds   -   (+)EHL/FHL   -   (+) Sensation to light touch  -   (-) Calf tenderness Bilaterally    A/P: 72y Female s/p Knee replacement  Right knee replacement     -   Ortho Stable  -   Pain control, + valium for muscle spasm  -   Medicine to follow  -   DVT ppx:     [x ]SCDs     [ ] ASA     [ x] Eliquis     [ ] Lovenox  -   Weight bearing status:  WBAT [x ]        PWB    [ ]     TTWB  [ ]      NWB  [ ]   -  Dispo:     Home [ ]     Acute Rehab [ ]     [facility] [x ]     TBD [ ]

## 2017-08-29 NOTE — PROGRESS NOTE ADULT - SUBJECTIVE AND OBJECTIVE BOX
Patient is a 72y old  Female who presents with a chief complaint of Knee Pain (24 Aug 2017 10:26)        HPI:      SUBJECTIVE & OBJECTIVE: Pt seen and examined at bedside.     PHYSICAL EXAM:  T(C): 36.8 (08-29-17 @ 07:30), Max: 36.9 (08-28-17 @ 19:13)  HR: 60 (08-29-17 @ 07:30) (60 - 79)  BP: 113/63 (08-29-17 @ 07:30) (86/59 - 122/74)  RR: 16 (08-29-17 @ 07:30) (15 - 19)  SpO2: 95% (08-29-17 @ 07:30) (94% - 100%)  Wt(kg): --   GENERAL: NAD, well-groomed, well-developed  HEAD:  Atraumatic, Normocephalic  EYES: EOMI, PERRLA, conjunctiva and sclera clear  ENMT: Moist mucous membranes  NECK: Supple, No JVD  NERVOUS SYSTEM:  Alert & Oriented X3, Motor Strength 5/5 B/L upper and lower extremities; DTRs 2+ intact and symmetric  CHEST/LUNG: Clear to auscultation bilaterally; No rales, rhonchi, wheezing, or rubs  HEART: Regular rate and rhythm; No murmurs, rubs, or gallops  ABDOMEN: Soft, Nontender, Nondistended; Bowel sounds present  EXTREMITIES:  2+ Peripheral Pulses, No clubbing, cyanosis, or edema        MEDICATIONS  (STANDING):  fluticasone propionate 50 MICROgram(s)/spray Nasal Spray 2 Spray(s) Both Nostrils daily  lactated ringers. 1000 milliLiter(s) (75 mL/Hr) IV Continuous <Continuous>  pantoprazole    Tablet 40 milliGRAM(s) Oral before breakfast  senna 2 Tablet(s) Oral at bedtime  docusate sodium 100 milliGRAM(s) Oral three times a day  apixaban 2.5 milliGRAM(s) Oral every 12 hours  celecoxib 200 milliGRAM(s) Oral two times a day with meals  aspirin  chewable 81 milliGRAM(s) Oral daily  topiramate 50 milliGRAM(s) Oral two times a day  acetaminophen   Tablet. 1000 milliGRAM(s) Oral every 8 hours  atorvastatin 40 milliGRAM(s) Oral at bedtime  ATENolol  Tablet 50 milliGRAM(s) Oral two times a day  buDESOnide 160 MICROgram(s)/formoterol 4.5 MICROgram(s) Inhaler 2 Puff(s) Inhalation two times a day  fluocinonide 0.05% Cream 1 Application(s) Topical two times a day  montelukast 10 milliGRAM(s) Oral daily  morphine  - Injectable 2 milliGRAM(s) IV Push once    MEDICATIONS  (PRN):  aluminum hydroxide/magnesium hydroxide/simethicone Suspension 30 milliLiter(s) Oral four times a day PRN Indigestion  bisacodyl Suppository 10 milliGRAM(s) Rectal daily PRN If no bowel movement by postoperative day #2  ALBUTerol    90 MICROgram(s) HFA Inhaler 2 Puff(s) Inhalation every 6 hours PRN Shortness of Breath and/or Wheezing  aluminum hydroxide/magnesium hydroxide/simethicone Suspension 30 milliLiter(s) Oral four times a day PRN Indigestion  ondansetron Injectable 4 milliGRAM(s) IV Push every 6 hours PRN Nausea and/or Vomiting  magnesium hydroxide Suspension 30 milliLiter(s) Oral daily PRN Constipation  polyethylene glycol 3350 17 Gram(s) Oral daily PRN Constipation  traMADol 50 milliGRAM(s) Oral every 3 hours PRN Mild Pain (1 - 3)  traMADol 100 milliGRAM(s) Oral every 3 hours PRN Moderate Pain (4 - 6)  HYDROmorphone  Injectable 0.5 milliGRAM(s) IV Push every 3 hours PRN Severe Pain (7 - 10)  loratadine 10 milliGRAM(s) Oral daily PRN allergies  diazepam    Tablet 5 milliGRAM(s) Oral every 8 hours PRN muscle spasm      LABS:                        9.8    5.5   )-----------( 165      ( 29 Aug 2017 06:13 )             29.8     08-29    140  |  108  |  14  ----------------------------<  112<H>  4.6   |  26  |  0.79    Ca    8.8      29 Aug 2017 06:13      PT/INR - ( 27 Aug 2017 11:14 )   PT: 11.7 sec;   INR: 1.07 ratio               CAPILLARY BLOOD GLUCOSE  112 (29 Aug 2017 08:03)          CAPILLARY BLOOD GLUCOSE  112 (29 Aug 2017 08:03)        CAPILLARY BLOOD GLUCOSE  112 (29 Aug 2017 08:03)                RECENT CULTURES:      RADIOLOGY & ADDITIONAL TESTS:                        DVT/GI ppx  Discussed with pt @ bedside

## 2017-08-29 NOTE — DIETITIAN INITIAL EVALUATION ADULT. - OTHER INFO
72F s/p BL TKR, seen per LOS policy. Appetite and intake generally good throughout adm on regular diet. Po decreased at present secondary to increased pain, meal pref obtained to optimize po intake. NKFA. Skin intact c surgical incisions; maria victoria 20.

## 2017-08-29 NOTE — ANESTHESIA FOLLOW-UP NOTE - NSEVALATION_GEN_ALL_CORE
No apparent complications or complaints reguarding anesthesia care at this time/All questions were answered
All questions were answered/No apparent complications or complaints reguarding anesthesia care at this time

## 2017-08-30 LAB
ANION GAP SERPL CALC-SCNC: 8 MMOL/L — SIGNIFICANT CHANGE UP (ref 5–17)
BUN SERPL-MCNC: 14 MG/DL — SIGNIFICANT CHANGE UP (ref 7–23)
CALCIUM SERPL-MCNC: 9.4 MG/DL — SIGNIFICANT CHANGE UP (ref 8.4–10.5)
CHLORIDE SERPL-SCNC: 107 MMOL/L — SIGNIFICANT CHANGE UP (ref 96–108)
CO2 SERPL-SCNC: 24 MMOL/L — SIGNIFICANT CHANGE UP (ref 22–31)
CREAT SERPL-MCNC: 0.72 MG/DL — SIGNIFICANT CHANGE UP (ref 0.5–1.3)
GLUCOSE SERPL-MCNC: 130 MG/DL — HIGH (ref 70–99)
HCT VFR BLD CALC: 31.4 % — LOW (ref 34.5–45)
HGB BLD-MCNC: 10.4 G/DL — LOW (ref 11.5–15.5)
MCHC RBC-ENTMCNC: 31.5 PG — SIGNIFICANT CHANGE UP (ref 27–34)
MCHC RBC-ENTMCNC: 33.2 GM/DL — SIGNIFICANT CHANGE UP (ref 32–36)
MCV RBC AUTO: 94.8 FL — SIGNIFICANT CHANGE UP (ref 80–100)
PLATELET # BLD AUTO: 191 K/UL — SIGNIFICANT CHANGE UP (ref 150–400)
POTASSIUM SERPL-MCNC: 4.3 MMOL/L — SIGNIFICANT CHANGE UP (ref 3.5–5.3)
POTASSIUM SERPL-SCNC: 4.3 MMOL/L — SIGNIFICANT CHANGE UP (ref 3.5–5.3)
RBC # BLD: 3.32 M/UL — LOW (ref 3.8–5.2)
RBC # FLD: 12.5 % — SIGNIFICANT CHANGE UP (ref 10.3–14.5)
SODIUM SERPL-SCNC: 139 MMOL/L — SIGNIFICANT CHANGE UP (ref 135–145)
WBC # BLD: 6.6 K/UL — SIGNIFICANT CHANGE UP (ref 3.8–10.5)
WBC # FLD AUTO: 6.6 K/UL — SIGNIFICANT CHANGE UP (ref 3.8–10.5)

## 2017-08-30 PROCEDURE — 99233 SBSQ HOSP IP/OBS HIGH 50: CPT

## 2017-08-30 RX ORDER — HYDROMORPHONE HYDROCHLORIDE 2 MG/ML
2 INJECTION INTRAMUSCULAR; INTRAVENOUS; SUBCUTANEOUS
Qty: 0 | Refills: 0 | Status: DISCONTINUED | OUTPATIENT
Start: 2017-08-30 | End: 2017-08-30

## 2017-08-30 RX ORDER — OXYCODONE HYDROCHLORIDE 5 MG/1
10 TABLET ORAL EVERY 4 HOURS
Qty: 0 | Refills: 0 | Status: DISCONTINUED | OUTPATIENT
Start: 2017-08-30 | End: 2017-08-31

## 2017-08-30 RX ORDER — HYDROMORPHONE HYDROCHLORIDE 2 MG/ML
4 INJECTION INTRAMUSCULAR; INTRAVENOUS; SUBCUTANEOUS
Qty: 0 | Refills: 0 | Status: DISCONTINUED | OUTPATIENT
Start: 2017-08-30 | End: 2017-08-30

## 2017-08-30 RX ORDER — LIDOCAINE 4 G/100G
1 CREAM TOPICAL DAILY
Qty: 0 | Refills: 0 | Status: DISCONTINUED | OUTPATIENT
Start: 2017-08-30 | End: 2017-09-01

## 2017-08-30 RX ORDER — OXYCODONE HYDROCHLORIDE 5 MG/1
10 TABLET ORAL ONCE
Qty: 0 | Refills: 0 | Status: DISCONTINUED | OUTPATIENT
Start: 2017-08-30 | End: 2017-08-30

## 2017-08-30 RX ADMIN — CELECOXIB 200 MILLIGRAM(S): 200 CAPSULE ORAL at 09:00

## 2017-08-30 RX ADMIN — Medication 100 MILLIGRAM(S): at 06:35

## 2017-08-30 RX ADMIN — APIXABAN 2.5 MILLIGRAM(S): 2.5 TABLET, FILM COATED ORAL at 21:07

## 2017-08-30 RX ADMIN — LIDOCAINE 1 PATCH: 4 CREAM TOPICAL at 21:08

## 2017-08-30 RX ADMIN — HYDROMORPHONE HYDROCHLORIDE 4 MILLIGRAM(S): 2 INJECTION INTRAMUSCULAR; INTRAVENOUS; SUBCUTANEOUS at 14:14

## 2017-08-30 RX ADMIN — CELECOXIB 200 MILLIGRAM(S): 200 CAPSULE ORAL at 17:47

## 2017-08-30 RX ADMIN — HYDROMORPHONE HYDROCHLORIDE 4 MILLIGRAM(S): 2 INJECTION INTRAMUSCULAR; INTRAVENOUS; SUBCUTANEOUS at 04:33

## 2017-08-30 RX ADMIN — OXYCODONE HYDROCHLORIDE 10 MILLIGRAM(S): 5 TABLET ORAL at 22:00

## 2017-08-30 RX ADMIN — OXYCODONE HYDROCHLORIDE 10 MILLIGRAM(S): 5 TABLET ORAL at 18:15

## 2017-08-30 RX ADMIN — ATENOLOL 50 MILLIGRAM(S): 25 TABLET ORAL at 06:35

## 2017-08-30 RX ADMIN — HYDROMORPHONE HYDROCHLORIDE 4 MILLIGRAM(S): 2 INJECTION INTRAMUSCULAR; INTRAVENOUS; SUBCUTANEOUS at 08:30

## 2017-08-30 RX ADMIN — Medication 81 MILLIGRAM(S): at 12:44

## 2017-08-30 RX ADMIN — Medication 1000 MILLIGRAM(S): at 18:11

## 2017-08-30 RX ADMIN — OXYCODONE HYDROCHLORIDE 10 MILLIGRAM(S): 5 TABLET ORAL at 10:13

## 2017-08-30 RX ADMIN — Medication 1000 MILLIGRAM(S): at 10:12

## 2017-08-30 RX ADMIN — CELECOXIB 200 MILLIGRAM(S): 200 CAPSULE ORAL at 08:30

## 2017-08-30 RX ADMIN — Medication 1000 MILLIGRAM(S): at 02:36

## 2017-08-30 RX ADMIN — OXYCODONE HYDROCHLORIDE 10 MILLIGRAM(S): 5 TABLET ORAL at 21:07

## 2017-08-30 RX ADMIN — ATENOLOL 50 MILLIGRAM(S): 25 TABLET ORAL at 17:49

## 2017-08-30 RX ADMIN — Medication 2 SPRAY(S): at 12:44

## 2017-08-30 RX ADMIN — Medication 1000 MILLIGRAM(S): at 17:47

## 2017-08-30 RX ADMIN — Medication 1000 MILLIGRAM(S): at 10:45

## 2017-08-30 RX ADMIN — POLYETHYLENE GLYCOL 3350 17 GRAM(S): 17 POWDER, FOR SOLUTION ORAL at 12:44

## 2017-08-30 RX ADMIN — OXYCODONE HYDROCHLORIDE 10 MILLIGRAM(S): 5 TABLET ORAL at 10:45

## 2017-08-30 RX ADMIN — BUDESONIDE AND FORMOTEROL FUMARATE DIHYDRATE 2 PUFF(S): 160; 4.5 AEROSOL RESPIRATORY (INHALATION) at 18:48

## 2017-08-30 RX ADMIN — Medication 50 MILLIGRAM(S): at 06:35

## 2017-08-30 RX ADMIN — LIDOCAINE 1 PATCH: 4 CREAM TOPICAL at 08:49

## 2017-08-30 RX ADMIN — HYDROMORPHONE HYDROCHLORIDE 4 MILLIGRAM(S): 2 INJECTION INTRAMUSCULAR; INTRAVENOUS; SUBCUTANEOUS at 05:10

## 2017-08-30 RX ADMIN — Medication 50 MILLIGRAM(S): at 17:47

## 2017-08-30 RX ADMIN — APIXABAN 2.5 MILLIGRAM(S): 2.5 TABLET, FILM COATED ORAL at 08:31

## 2017-08-30 RX ADMIN — HYDROMORPHONE HYDROCHLORIDE 4 MILLIGRAM(S): 2 INJECTION INTRAMUSCULAR; INTRAVENOUS; SUBCUTANEOUS at 00:40

## 2017-08-30 RX ADMIN — CELECOXIB 200 MILLIGRAM(S): 200 CAPSULE ORAL at 18:11

## 2017-08-30 RX ADMIN — BUDESONIDE AND FORMOTEROL FUMARATE DIHYDRATE 2 PUFF(S): 160; 4.5 AEROSOL RESPIRATORY (INHALATION) at 06:37

## 2017-08-30 RX ADMIN — OXYCODONE HYDROCHLORIDE 10 MILLIGRAM(S): 5 TABLET ORAL at 17:47

## 2017-08-30 RX ADMIN — ATORVASTATIN CALCIUM 40 MILLIGRAM(S): 80 TABLET, FILM COATED ORAL at 21:07

## 2017-08-30 RX ADMIN — PANTOPRAZOLE SODIUM 40 MILLIGRAM(S): 20 TABLET, DELAYED RELEASE ORAL at 06:08

## 2017-08-30 RX ADMIN — Medication 100 MILLIGRAM(S): at 14:15

## 2017-08-30 RX ADMIN — MONTELUKAST 10 MILLIGRAM(S): 4 TABLET, CHEWABLE ORAL at 12:44

## 2017-08-30 RX ADMIN — Medication 1000 MILLIGRAM(S): at 03:00

## 2017-08-30 RX ADMIN — HYDROMORPHONE HYDROCHLORIDE 4 MILLIGRAM(S): 2 INJECTION INTRAMUSCULAR; INTRAVENOUS; SUBCUTANEOUS at 00:02

## 2017-08-30 RX ADMIN — HYDROMORPHONE HYDROCHLORIDE 4 MILLIGRAM(S): 2 INJECTION INTRAMUSCULAR; INTRAVENOUS; SUBCUTANEOUS at 15:00

## 2017-08-30 RX ADMIN — HYDROMORPHONE HYDROCHLORIDE 4 MILLIGRAM(S): 2 INJECTION INTRAMUSCULAR; INTRAVENOUS; SUBCUTANEOUS at 09:00

## 2017-08-30 NOTE — PROGRESS NOTE ADULT - SUBJECTIVE AND OBJECTIVE BOX
ORTHOPEDIC PA PROGRESS NOTE  ALIYA MCNEILL      72y Female                                                                                                                               POD #    2    STATUS POST:               Pre-Op Dx: Primary localized osteoarthritis of left knee 8/28  Primary osteoarthritis of right knee 8/23    Post-Op Dx:  Primary localized osteoarthritis of left knee  Primary osteoarthritis of right knee    Procedure: Knee replacement left by dr. Samuel 8/28/17  Right knee replacement by Dr. Samuel 8/23/17                                                Pain (0-10):  Pt reports 5/10 pain to Right knee controlled with medication but 8/10 pain to left knee not fully controlled with medication. Pt denies any CP, SOB, fever, chills, numbness/tingling. Pt positive void.   Current Pain Management:  [x ] Po Analgesics [x ] IM /IV Anagesics     T(F): 98.3  HR: 78  BP: 119/79  RR: 16  SpO2: 95%                         10.4   6.6   )-----------( 191      ( 30 Aug 2017 07:13 )             31.4         08-30    139  |  107  |  14  ----------------------------<  130<H>  4.3   |  24  |  0.72    Ca    9.4      30 Aug 2017 07:13      Physical Exam :    -   Dressing changed sterile.   -   Wound C/D/I.   -   Distal Neurvascular status intact grossly.   -   Warm well perfused; capillary refill <3 seconds   -   (+)EHL/FHL   -   (+) Sensation to light touch  -   (-) Calf tenderness Bilaterally    A/P: 72y Female s/p Knee replacement  Right knee replacement       -   Pain control   -   Medicine to follow  -   DVT ppx:     [ ]SCDs     [ ] ASA     [ ] Eliquis     [ ] Lovenox  -   Weight bearing status:  WBAT [ ]        PWB    [ ]     TTWB  [ ]      NWB  [ ]   -  Dispo:     Home [ ]     Acute Rehab [ ]     [facility] [ ]     TBD [ ] ORTHOPEDIC PA PROGRESS NOTE  ALIYA MCNEILL      72y Female                                                                                                                               POD #    2    STATUS POST:               Pre-Op Dx: Primary localized osteoarthritis of left knee 8/28  Primary osteoarthritis of right knee 8/23    Post-Op Dx:  Primary localized osteoarthritis of left knee  Primary osteoarthritis of right knee    Procedure: Knee replacement left by dr. Samuel 8/28/17  Right knee replacement by Dr. Samuel 8/23/17                                                Pain (0-10):  Pt reports 5/10 pain to Right knee controlled with medication but 8/10 pain to left knee not fully controlled with medication. Pt denies any CP, SOB, fever, chills, numbness/tingling. Pt positive void.   Current Pain Management:  [x ] Po Analgesics [x ] IM /IV Anagesics     T(F): 98.3  HR: 78  BP: 119/79  RR: 16  SpO2: 95%                         10.4   6.6   )-----------( 191      ( 30 Aug 2017 07:13 )             31.4         08-30    139  |  107  |  14  ----------------------------<  130<H>  4.3   |  24  |  0.72    Ca    9.4      30 Aug 2017 07:13      Physical Exam :    -   Dressing and  Wound C/D/I.   -   Distal Neurvascular status intact grossly.   -   Warm well perfused; capillary refill <3 seconds   -   (+)EHL/FHL   -   (+) Sensation to light touch  -   (-) Calf tenderness Bilaterally    A/P: 72y Female s/p Knee replacement  Right knee replacement       -   Pain control   -   Medicine to follow  -   DVT ppx:     [x ]SCDs       [ x] Eliquis      -   Weight bearing status:  WBAT [ x]          -  f/u am labs  -  Lidocaine patch ordered. f/u pain control  -  Conitnue incentive sprirometer  -  [facility] PT/OT   -  Dispo:   [facility] pending pain control and medical clearance

## 2017-08-30 NOTE — PROGRESS NOTE ADULT - SUBJECTIVE AND OBJECTIVE BOX
Patient is a 72y old  Female who presents with a chief complaint of Knee Pain (24 Aug 2017 10:26)        HPI:      SUBJECTIVE & OBJECTIVE: Pt seen and examined at bedside.     PHYSICAL EXAM:  T(C): 36.8 (08-30-17 @ 07:09), Max: 36.9 (08-30-17 @ 03:00)  HR: 78 (08-30-17 @ 07:09) (59 - 81)  BP: 119/79 (08-30-17 @ 07:09) (109/76 - 127/64)  RR: 16 (08-30-17 @ 07:09) (16 - 17)  SpO2: 95% (08-30-17 @ 07:09) (93% - 98%)  Wt(kg): --   GENERAL: NAD, well-groomed, well-developed  HEAD:  Atraumatic, Normocephalic  EYES: EOMI, PERRLA, conjunctiva and sclera clear  ENMT: Moist mucous membranes  NECK: Supple, No JVD  NERVOUS SYSTEM:  Alert & Oriented X3, Motor Strength 5/5 B/L upper and lower extremities; DTRs 2+ intact and symmetric  CHEST/LUNG: Clear to auscultation bilaterally; No rales, rhonchi, wheezing, or rubs  HEART: Regular rate and rhythm; No murmurs, rubs, or gallops  ABDOMEN: Soft, Nontender, Nondistended; Bowel sounds present  EXTREMITIES:  2+ Peripheral Pulses, No clubbing, cyanosis, or edema        MEDICATIONS  (STANDING):  fluticasone propionate 50 MICROgram(s)/spray Nasal Spray 2 Spray(s) Both Nostrils daily  lactated ringers. 1000 milliLiter(s) (75 mL/Hr) IV Continuous <Continuous>  pantoprazole    Tablet 40 milliGRAM(s) Oral before breakfast  senna 2 Tablet(s) Oral at bedtime  docusate sodium 100 milliGRAM(s) Oral three times a day  apixaban 2.5 milliGRAM(s) Oral every 12 hours  celecoxib 200 milliGRAM(s) Oral two times a day with meals  aspirin  chewable 81 milliGRAM(s) Oral daily  topiramate 50 milliGRAM(s) Oral two times a day  acetaminophen   Tablet. 1000 milliGRAM(s) Oral every 8 hours  atorvastatin 40 milliGRAM(s) Oral at bedtime  ATENolol  Tablet 50 milliGRAM(s) Oral two times a day  buDESOnide 160 MICROgram(s)/formoterol 4.5 MICROgram(s) Inhaler 2 Puff(s) Inhalation two times a day  fluocinonide 0.05% Cream 1 Application(s) Topical two times a day  montelukast 10 milliGRAM(s) Oral daily  oxyCODONE    IR 10 milliGRAM(s) Oral once    MEDICATIONS  (PRN):  aluminum hydroxide/magnesium hydroxide/simethicone Suspension 30 milliLiter(s) Oral four times a day PRN Indigestion  bisacodyl Suppository 10 milliGRAM(s) Rectal daily PRN If no bowel movement by postoperative day #2  ALBUTerol    90 MICROgram(s) HFA Inhaler 2 Puff(s) Inhalation every 6 hours PRN Shortness of Breath and/or Wheezing  aluminum hydroxide/magnesium hydroxide/simethicone Suspension 30 milliLiter(s) Oral four times a day PRN Indigestion  ondansetron Injectable 4 milliGRAM(s) IV Push every 6 hours PRN Nausea and/or Vomiting  magnesium hydroxide Suspension 30 milliLiter(s) Oral daily PRN Constipation  polyethylene glycol 3350 17 Gram(s) Oral daily PRN Constipation  bisacodyl Suppository 10 milliGRAM(s) Rectal daily PRN If no bowel movement by postoperative day #2  HYDROmorphone  Injectable 0.5 milliGRAM(s) IV Push every 3 hours PRN Severe Pain (7 - 10)  loratadine 10 milliGRAM(s) Oral daily PRN allergies  diazepam    Tablet 5 milliGRAM(s) Oral every 8 hours PRN muscle spasm  lidocaine   Patch 1 Patch Transdermal daily PRN Pain  HYDROmorphone   Tablet 2 milliGRAM(s) Oral every 3 hours PRN Mild Pain (1 - 3)  HYDROmorphone   Tablet 4 milliGRAM(s) Oral every 3 hours PRN Moderate Pain (4 - 6)      LABS:                        10.4   6.6   )-----------( 191      ( 30 Aug 2017 07:13 )             31.4     08-30    139  |  107  |  14  ----------------------------<  130<H>  4.3   |  24  |  0.72    Ca    9.4      30 Aug 2017 07:13            CAPILLARY BLOOD GLUCOSE  151 (29 Aug 2017 17:00)  145 (29 Aug 2017 12:00)          CAPILLARY BLOOD GLUCOSE  151 (29 Aug 2017 17:00)  145 (29 Aug 2017 12:00)        CAPILLARY BLOOD GLUCOSE  151 (29 Aug 2017 17:00)                RECENT CULTURES:      RADIOLOGY & ADDITIONAL TESTS:                        DVT/GI ppx  Discussed with pt @ bedside

## 2017-08-31 LAB
ANION GAP SERPL CALC-SCNC: 8 MMOL/L — SIGNIFICANT CHANGE UP (ref 5–17)
BUN SERPL-MCNC: 16 MG/DL — SIGNIFICANT CHANGE UP (ref 7–23)
CALCIUM SERPL-MCNC: 9.3 MG/DL — SIGNIFICANT CHANGE UP (ref 8.4–10.5)
CHLORIDE SERPL-SCNC: 107 MMOL/L — SIGNIFICANT CHANGE UP (ref 96–108)
CO2 SERPL-SCNC: 26 MMOL/L — SIGNIFICANT CHANGE UP (ref 22–31)
CREAT SERPL-MCNC: 0.76 MG/DL — SIGNIFICANT CHANGE UP (ref 0.5–1.3)
GLUCOSE SERPL-MCNC: 114 MG/DL — HIGH (ref 70–99)
HCT VFR BLD CALC: 29 % — LOW (ref 34.5–45)
HGB BLD-MCNC: 9.8 G/DL — LOW (ref 11.5–15.5)
MCHC RBC-ENTMCNC: 31.8 PG — SIGNIFICANT CHANGE UP (ref 27–34)
MCHC RBC-ENTMCNC: 33.8 GM/DL — SIGNIFICANT CHANGE UP (ref 32–36)
MCV RBC AUTO: 94.3 FL — SIGNIFICANT CHANGE UP (ref 80–100)
PLATELET # BLD AUTO: 189 K/UL — SIGNIFICANT CHANGE UP (ref 150–400)
POTASSIUM SERPL-MCNC: 4.6 MMOL/L — SIGNIFICANT CHANGE UP (ref 3.5–5.3)
POTASSIUM SERPL-SCNC: 4.6 MMOL/L — SIGNIFICANT CHANGE UP (ref 3.5–5.3)
RBC # BLD: 3.08 M/UL — LOW (ref 3.8–5.2)
RBC # FLD: 12.4 % — SIGNIFICANT CHANGE UP (ref 10.3–14.5)
SODIUM SERPL-SCNC: 141 MMOL/L — SIGNIFICANT CHANGE UP (ref 135–145)
WBC # BLD: 6.1 K/UL — SIGNIFICANT CHANGE UP (ref 3.8–10.5)
WBC # FLD AUTO: 6.1 K/UL — SIGNIFICANT CHANGE UP (ref 3.8–10.5)

## 2017-08-31 PROCEDURE — 99233 SBSQ HOSP IP/OBS HIGH 50: CPT

## 2017-08-31 RX ORDER — ACETAMINOPHEN 500 MG
2 TABLET ORAL
Qty: 0 | Refills: 0 | DISCHARGE
Start: 2017-08-31

## 2017-08-31 RX ORDER — IBUPROFEN 200 MG
1 TABLET ORAL
Qty: 0 | Refills: 0 | COMMUNITY

## 2017-08-31 RX ORDER — DOCUSATE SODIUM 100 MG
1 CAPSULE ORAL
Qty: 0 | Refills: 0 | COMMUNITY

## 2017-08-31 RX ORDER — CELECOXIB 200 MG/1
1 CAPSULE ORAL
Qty: 0 | Refills: 0 | DISCHARGE
Start: 2017-08-31

## 2017-08-31 RX ORDER — POLYETHYLENE GLYCOL 3350 17 G/17G
17 POWDER, FOR SOLUTION ORAL
Qty: 0 | Refills: 0 | DISCHARGE
Start: 2017-08-31

## 2017-08-31 RX ORDER — OXYCODONE HYDROCHLORIDE 5 MG/1
10 TABLET ORAL
Qty: 0 | Refills: 0 | Status: DISCONTINUED | OUTPATIENT
Start: 2017-08-31 | End: 2017-09-01

## 2017-08-31 RX ORDER — MELOXICAM 15 MG/1
1 TABLET ORAL
Qty: 0 | Refills: 0 | COMMUNITY

## 2017-08-31 RX ORDER — ACETAMINOPHEN 500 MG
1 TABLET ORAL
Qty: 0 | Refills: 0 | DISCHARGE
Start: 2017-08-31

## 2017-08-31 RX ORDER — APIXABAN 2.5 MG/1
1 TABLET, FILM COATED ORAL
Qty: 0 | Refills: 0 | DISCHARGE
Start: 2017-08-31

## 2017-08-31 RX ORDER — SENNA PLUS 8.6 MG/1
2 TABLET ORAL
Qty: 0 | Refills: 0 | DISCHARGE
Start: 2017-08-31

## 2017-08-31 RX ORDER — ASPIRIN/CALCIUM CARB/MAGNESIUM 324 MG
1 TABLET ORAL
Qty: 0 | Refills: 0 | COMMUNITY

## 2017-08-31 RX ORDER — OXYCODONE HYDROCHLORIDE 5 MG/1
1 TABLET ORAL
Qty: 0 | Refills: 0 | DISCHARGE
Start: 2017-08-31

## 2017-08-31 RX ORDER — ALBUTEROL 90 UG/1
2 AEROSOL, METERED ORAL
Qty: 0 | Refills: 0 | DISCHARGE
Start: 2017-08-31

## 2017-08-31 RX ORDER — LIDOCAINE 4 G/100G
1 CREAM TOPICAL
Qty: 0 | Refills: 0 | DISCHARGE
Start: 2017-08-31

## 2017-08-31 RX ORDER — OXYCODONE HYDROCHLORIDE 5 MG/1
1 TABLET ORAL
Qty: 0 | Refills: 0 | COMMUNITY
Start: 2017-08-31

## 2017-08-31 RX ADMIN — OXYCODONE HYDROCHLORIDE 10 MILLIGRAM(S): 5 TABLET ORAL at 02:00

## 2017-08-31 RX ADMIN — OXYCODONE HYDROCHLORIDE 10 MILLIGRAM(S): 5 TABLET ORAL at 11:00

## 2017-08-31 RX ADMIN — Medication 1000 MILLIGRAM(S): at 01:29

## 2017-08-31 RX ADMIN — Medication 81 MILLIGRAM(S): at 12:13

## 2017-08-31 RX ADMIN — OXYCODONE HYDROCHLORIDE 10 MILLIGRAM(S): 5 TABLET ORAL at 01:29

## 2017-08-31 RX ADMIN — BUDESONIDE AND FORMOTEROL FUMARATE DIHYDRATE 2 PUFF(S): 160; 4.5 AEROSOL RESPIRATORY (INHALATION) at 06:06

## 2017-08-31 RX ADMIN — OXYCODONE HYDROCHLORIDE 10 MILLIGRAM(S): 5 TABLET ORAL at 06:06

## 2017-08-31 RX ADMIN — LIDOCAINE 1 PATCH: 4 CREAM TOPICAL at 09:06

## 2017-08-31 RX ADMIN — OXYCODONE HYDROCHLORIDE 10 MILLIGRAM(S): 5 TABLET ORAL at 20:15

## 2017-08-31 RX ADMIN — Medication 2 SPRAY(S): at 12:13

## 2017-08-31 RX ADMIN — APIXABAN 2.5 MILLIGRAM(S): 2.5 TABLET, FILM COATED ORAL at 09:01

## 2017-08-31 RX ADMIN — Medication 1000 MILLIGRAM(S): at 02:12

## 2017-08-31 RX ADMIN — OXYCODONE HYDROCHLORIDE 10 MILLIGRAM(S): 5 TABLET ORAL at 21:00

## 2017-08-31 RX ADMIN — Medication 1000 MILLIGRAM(S): at 09:01

## 2017-08-31 RX ADMIN — APIXABAN 2.5 MILLIGRAM(S): 2.5 TABLET, FILM COATED ORAL at 21:11

## 2017-08-31 RX ADMIN — OXYCODONE HYDROCHLORIDE 10 MILLIGRAM(S): 5 TABLET ORAL at 17:19

## 2017-08-31 RX ADMIN — Medication 50 MILLIGRAM(S): at 06:06

## 2017-08-31 RX ADMIN — CELECOXIB 200 MILLIGRAM(S): 200 CAPSULE ORAL at 17:18

## 2017-08-31 RX ADMIN — LIDOCAINE 1 PATCH: 4 CREAM TOPICAL at 21:14

## 2017-08-31 RX ADMIN — ATENOLOL 50 MILLIGRAM(S): 25 TABLET ORAL at 17:19

## 2017-08-31 RX ADMIN — Medication 1000 MILLIGRAM(S): at 17:43

## 2017-08-31 RX ADMIN — MONTELUKAST 10 MILLIGRAM(S): 4 TABLET, CHEWABLE ORAL at 12:13

## 2017-08-31 RX ADMIN — Medication 1000 MILLIGRAM(S): at 17:19

## 2017-08-31 RX ADMIN — Medication 100 MILLIGRAM(S): at 13:34

## 2017-08-31 RX ADMIN — BUDESONIDE AND FORMOTEROL FUMARATE DIHYDRATE 2 PUFF(S): 160; 4.5 AEROSOL RESPIRATORY (INHALATION) at 18:33

## 2017-08-31 RX ADMIN — OXYCODONE HYDROCHLORIDE 10 MILLIGRAM(S): 5 TABLET ORAL at 06:40

## 2017-08-31 RX ADMIN — OXYCODONE HYDROCHLORIDE 10 MILLIGRAM(S): 5 TABLET ORAL at 15:00

## 2017-08-31 RX ADMIN — CELECOXIB 200 MILLIGRAM(S): 200 CAPSULE ORAL at 09:01

## 2017-08-31 RX ADMIN — OXYCODONE HYDROCHLORIDE 10 MILLIGRAM(S): 5 TABLET ORAL at 14:18

## 2017-08-31 RX ADMIN — CELECOXIB 200 MILLIGRAM(S): 200 CAPSULE ORAL at 09:30

## 2017-08-31 RX ADMIN — PANTOPRAZOLE SODIUM 40 MILLIGRAM(S): 20 TABLET, DELAYED RELEASE ORAL at 05:35

## 2017-08-31 RX ADMIN — OXYCODONE HYDROCHLORIDE 10 MILLIGRAM(S): 5 TABLET ORAL at 17:51

## 2017-08-31 RX ADMIN — ATENOLOL 50 MILLIGRAM(S): 25 TABLET ORAL at 06:06

## 2017-08-31 RX ADMIN — ATORVASTATIN CALCIUM 40 MILLIGRAM(S): 80 TABLET, FILM COATED ORAL at 21:11

## 2017-08-31 RX ADMIN — Medication 1000 MILLIGRAM(S): at 09:30

## 2017-08-31 RX ADMIN — CELECOXIB 200 MILLIGRAM(S): 200 CAPSULE ORAL at 17:43

## 2017-08-31 RX ADMIN — Medication 50 MILLIGRAM(S): at 17:19

## 2017-08-31 RX ADMIN — POLYETHYLENE GLYCOL 3350 17 GRAM(S): 17 POWDER, FOR SOLUTION ORAL at 17:18

## 2017-08-31 RX ADMIN — OXYCODONE HYDROCHLORIDE 10 MILLIGRAM(S): 5 TABLET ORAL at 10:17

## 2017-08-31 NOTE — PROGRESS NOTE ADULT - SUBJECTIVE AND OBJECTIVE BOX
Procedure: Right (8/23)/ Left TKR(8/28)  POD #: 3 Left  S: Pt without complaints. No SOB,CP, N/V. Tolerated Diet well.   Pain comfortable (3-4/10 ) on   Interval Rx.   Had[+BM], + flatus, No abdominal pain.   PT activity yesterday:  Walked with walker  Pain Rx:  HYDROmorphone  Injectable 0.5 milliGRAM(s) IV Push every 3 hours PRN  celecoxib 200 milliGRAM(s) Oral two times a day with meals  topiramate 50 milliGRAM(s) Oral two times a day  acetaminophen   Tablet. 1000 milliGRAM(s) Oral every 8 hours  diazepam    Tablet 5 milliGRAM(s) Oral every 8 hours PRN  oxyCODONE    IR 10 milliGRAM(s) Oral every 4 hours PRN    O: General: On exam, No Apparent Distress  Vital Signs Last 24 Hrs  T(C): 36.7 (30 Aug 2017 23:00), Max: 37 (30 Aug 2017 15:00)  T(F): 98 (30 Aug 2017 23:00), Max: 98.6 (30 Aug 2017 15:00)  HR: 76 (31 Aug 2017 06:04) (67 - 83)  BP: 116/78 (31 Aug 2017 06:04) (107/57 - 124/77)  RR: 16 (30 Aug 2017 23:00) (16 - 16)  SpO2: 95% (30 Aug 2017 23:00) (95% - 97%)    Lungs: BS clear bilat.  Heart: RR & R  [Abdomen]: + BS, soft , benign exam     Ext(Knee): Left Knee Incision] clean, dry, & intact,  Prineo intact; no  dehiscence; No  cellutitis; Min - Mod effusion[ soft ]. Moderate soft tissue swelling; [ No fluctuance, No crepitus].    Right Knee Incision] clean, dry, & intact,  Prineo intact; no  dehiscence; No  cellutitis; Min - Mod effusion[ soft ]. Moderate soft tissue swelling; [ No fluctuance, No crepitus].   ROM: Left: Extension -5 > 0 deg. Flexion = 85 deg flexion; Right: Ext: 0 deg, Flexion: 100 deg   Neurologic:  Has sensation over feet & toes bilat. Full AROM bilat feet & toes. EHL/AT = 5/5 bilat.  Vascular: Feet toes warm, pink. DP = 2+ Bilat. No calf tenderness bilat..  Urine Out [11P-7A] = Voiding  VTEP: On Bilat. Venodynes + apixaban 2.5 milliGRAM(s) Oral every 12 hours  aspirin  chewable 81 milliGRAM(s) Oral daily    Hospitalist input yest. noted.    Labs today:  CBC:                      9.8    6.1   )-----------( 189      ( 31 Aug 2017 06:31 )             29.0     Chem:  08-31    141  |  107  |  16  ----------------------------<  114<H>  4.6   |  26  |  0.76    Ca    9.3      31 Aug 2017 06:31    Primary Orthopedic Assessment:  • Stable from Orthopedic perspective  • Neuro motor exam stable vilat. LE  • Labs: CBC with min Post-Op Anemia; Chem stable    Plan:   • Continue:  PT/OT/WBAT with assistance of a walker/Ice to knee/ A & Passive Knee ROM       Incentive spirometry encouraged   • Continue DVT prophylaxis as prescribed, including use of compression devices and ankle pumps  • Continue Pain Rx  • Plans per Medicine   • Discharge planning – anticipated discharge is  [facility] when medically stable & cleared by PT/OT

## 2017-08-31 NOTE — PROGRESS NOTE ADULT - SUBJECTIVE AND OBJECTIVE BOX
Patient is a 72y old  Female who presents with a chief complaint of Knee Pain (24 Aug 2017 10:26)        HPI:      SUBJECTIVE & OBJECTIVE: Pt seen and examined at bedside.     PHYSICAL EXAM:  T(C): 36.8 (08-31-17 @ 07:14), Max: 37 (08-30-17 @ 15:00)  HR: 73 (08-31-17 @ 07:14) (67 - 83)  BP: 102/73 (08-31-17 @ 07:14) (102/73 - 124/77)  RR: 16 (08-31-17 @ 07:14) (16 - 16)  SpO2: 96% (08-31-17 @ 07:14) (95% - 97%)  Wt(kg): --   GENERAL: NAD, well-groomed, well-developed  HEAD:  Atraumatic, Normocephalic  EYES: EOMI, PERRLA, conjunctiva and sclera clear  ENMT: Moist mucous membranes  NECK: Supple, No JVD  NERVOUS SYSTEM:  Alert & Oriented X3, Motor Strength 5/5 B/L upper and lower extremities; DTRs 2+ intact and symmetric  CHEST/LUNG: Clear to auscultation bilaterally; No rales, rhonchi, wheezing, or rubs  HEART: Regular rate and rhythm; No murmurs, rubs, or gallops  ABDOMEN: Soft, Nontender, Nondistended; Bowel sounds present  EXTREMITIES:  2+ Peripheral Pulses, No clubbing, cyanosis, or edema        MEDICATIONS  (STANDING):  fluticasone propionate 50 MICROgram(s)/spray Nasal Spray 2 Spray(s) Both Nostrils daily  lactated ringers. 1000 milliLiter(s) (75 mL/Hr) IV Continuous <Continuous>  pantoprazole    Tablet 40 milliGRAM(s) Oral before breakfast  senna 2 Tablet(s) Oral at bedtime  docusate sodium 100 milliGRAM(s) Oral three times a day  apixaban 2.5 milliGRAM(s) Oral every 12 hours  celecoxib 200 milliGRAM(s) Oral two times a day with meals  aspirin  chewable 81 milliGRAM(s) Oral daily  topiramate 50 milliGRAM(s) Oral two times a day  acetaminophen   Tablet. 1000 milliGRAM(s) Oral every 8 hours  atorvastatin 40 milliGRAM(s) Oral at bedtime  ATENolol  Tablet 50 milliGRAM(s) Oral two times a day  buDESOnide 160 MICROgram(s)/formoterol 4.5 MICROgram(s) Inhaler 2 Puff(s) Inhalation two times a day  fluocinonide 0.05% Cream 1 Application(s) Topical two times a day  montelukast 10 milliGRAM(s) Oral daily    MEDICATIONS  (PRN):  aluminum hydroxide/magnesium hydroxide/simethicone Suspension 30 milliLiter(s) Oral four times a day PRN Indigestion  bisacodyl Suppository 10 milliGRAM(s) Rectal daily PRN If no bowel movement by postoperative day #2  ALBUTerol    90 MICROgram(s) HFA Inhaler 2 Puff(s) Inhalation every 6 hours PRN Shortness of Breath and/or Wheezing  aluminum hydroxide/magnesium hydroxide/simethicone Suspension 30 milliLiter(s) Oral four times a day PRN Indigestion  ondansetron Injectable 4 milliGRAM(s) IV Push every 6 hours PRN Nausea and/or Vomiting  magnesium hydroxide Suspension 30 milliLiter(s) Oral daily PRN Constipation  polyethylene glycol 3350 17 Gram(s) Oral daily PRN Constipation  bisacodyl Suppository 10 milliGRAM(s) Rectal daily PRN If no bowel movement by postoperative day #2  HYDROmorphone  Injectable 0.5 milliGRAM(s) IV Push every 3 hours PRN Severe Pain (7 - 10)  loratadine 10 milliGRAM(s) Oral daily PRN allergies  diazepam    Tablet 5 milliGRAM(s) Oral every 8 hours PRN muscle spasm  lidocaine   Patch 1 Patch Transdermal daily PRN Pain  oxyCODONE    IR 10 milliGRAM(s) Oral every 4 hours PRN Moderate Pain (4 - 6)      LABS:                        9.8    6.1   )-----------( 189      ( 31 Aug 2017 06:31 )             29.0     08-31    141  |  107  |  16  ----------------------------<  114<H>  4.6   |  26  |  0.76    Ca    9.3      31 Aug 2017 06:31            CAPILLARY BLOOD GLUCOSE          CAPILLARY BLOOD GLUCOSE        CAPILLARY BLOOD GLUCOSE  151 (29 Aug 2017 17:00)                RECENT CULTURES:      RADIOLOGY & ADDITIONAL TESTS:                        DVT/GI ppx  Discussed with pt @ bedside

## 2017-09-01 VITALS
SYSTOLIC BLOOD PRESSURE: 124 MMHG | RESPIRATION RATE: 18 BRPM | DIASTOLIC BLOOD PRESSURE: 71 MMHG | OXYGEN SATURATION: 975 % | HEART RATE: 79 BPM | TEMPERATURE: 98 F

## 2017-09-01 PROCEDURE — 93970 EXTREMITY STUDY: CPT

## 2017-09-01 PROCEDURE — 99233 SBSQ HOSP IP/OBS HIGH 50: CPT

## 2017-09-01 PROCEDURE — 97161 PT EVAL LOW COMPLEX 20 MIN: CPT

## 2017-09-01 PROCEDURE — C1776: CPT

## 2017-09-01 PROCEDURE — 97164 PT RE-EVAL EST PLAN CARE: CPT

## 2017-09-01 PROCEDURE — 86850 RBC ANTIBODY SCREEN: CPT

## 2017-09-01 PROCEDURE — 86900 BLOOD TYPING SEROLOGIC ABO: CPT

## 2017-09-01 PROCEDURE — 85610 PROTHROMBIN TIME: CPT

## 2017-09-01 PROCEDURE — 97530 THERAPEUTIC ACTIVITIES: CPT

## 2017-09-01 PROCEDURE — C1713: CPT

## 2017-09-01 PROCEDURE — 94640 AIRWAY INHALATION TREATMENT: CPT

## 2017-09-01 PROCEDURE — 86901 BLOOD TYPING SEROLOGIC RH(D): CPT

## 2017-09-01 PROCEDURE — 94664 DEMO&/EVAL PT USE INHALER: CPT

## 2017-09-01 PROCEDURE — 85018 HEMOGLOBIN: CPT

## 2017-09-01 PROCEDURE — 80048 BASIC METABOLIC PNL TOTAL CA: CPT

## 2017-09-01 PROCEDURE — 85027 COMPLETE CBC AUTOMATED: CPT

## 2017-09-01 PROCEDURE — 88305 TISSUE EXAM BY PATHOLOGIST: CPT

## 2017-09-01 PROCEDURE — 97110 THERAPEUTIC EXERCISES: CPT

## 2017-09-01 PROCEDURE — 97165 OT EVAL LOW COMPLEX 30 MIN: CPT

## 2017-09-01 PROCEDURE — 97168 OT RE-EVAL EST PLAN CARE: CPT

## 2017-09-01 PROCEDURE — 73562 X-RAY EXAM OF KNEE 3: CPT

## 2017-09-01 PROCEDURE — 88311 DECALCIFY TISSUE: CPT

## 2017-09-01 PROCEDURE — 97535 SELF CARE MNGMENT TRAINING: CPT

## 2017-09-01 PROCEDURE — 97116 GAIT TRAINING THERAPY: CPT

## 2017-09-01 PROCEDURE — 36415 COLL VENOUS BLD VENIPUNCTURE: CPT

## 2017-09-01 PROCEDURE — C1889: CPT

## 2017-09-01 RX ADMIN — OXYCODONE HYDROCHLORIDE 10 MILLIGRAM(S): 5 TABLET ORAL at 12:44

## 2017-09-01 RX ADMIN — ATENOLOL 50 MILLIGRAM(S): 25 TABLET ORAL at 06:05

## 2017-09-01 RX ADMIN — Medication 81 MILLIGRAM(S): at 12:44

## 2017-09-01 RX ADMIN — OXYCODONE HYDROCHLORIDE 10 MILLIGRAM(S): 5 TABLET ORAL at 01:59

## 2017-09-01 RX ADMIN — OXYCODONE HYDROCHLORIDE 10 MILLIGRAM(S): 5 TABLET ORAL at 09:04

## 2017-09-01 RX ADMIN — Medication 100 MILLIGRAM(S): at 12:44

## 2017-09-01 RX ADMIN — OXYCODONE HYDROCHLORIDE 10 MILLIGRAM(S): 5 TABLET ORAL at 13:30

## 2017-09-01 RX ADMIN — OXYCODONE HYDROCHLORIDE 10 MILLIGRAM(S): 5 TABLET ORAL at 06:05

## 2017-09-01 RX ADMIN — APIXABAN 2.5 MILLIGRAM(S): 2.5 TABLET, FILM COATED ORAL at 08:37

## 2017-09-01 RX ADMIN — CELECOXIB 200 MILLIGRAM(S): 200 CAPSULE ORAL at 08:37

## 2017-09-01 RX ADMIN — Medication 1000 MILLIGRAM(S): at 02:30

## 2017-09-01 RX ADMIN — MONTELUKAST 10 MILLIGRAM(S): 4 TABLET, CHEWABLE ORAL at 12:43

## 2017-09-01 RX ADMIN — OXYCODONE HYDROCHLORIDE 10 MILLIGRAM(S): 5 TABLET ORAL at 06:41

## 2017-09-01 RX ADMIN — Medication 1000 MILLIGRAM(S): at 09:04

## 2017-09-01 RX ADMIN — OXYCODONE HYDROCHLORIDE 10 MILLIGRAM(S): 5 TABLET ORAL at 15:50

## 2017-09-01 RX ADMIN — Medication 5 MILLIGRAM(S): at 09:05

## 2017-09-01 RX ADMIN — BUDESONIDE AND FORMOTEROL FUMARATE DIHYDRATE 2 PUFF(S): 160; 4.5 AEROSOL RESPIRATORY (INHALATION) at 06:06

## 2017-09-01 RX ADMIN — OXYCODONE HYDROCHLORIDE 10 MILLIGRAM(S): 5 TABLET ORAL at 02:30

## 2017-09-01 RX ADMIN — CELECOXIB 200 MILLIGRAM(S): 200 CAPSULE ORAL at 08:38

## 2017-09-01 RX ADMIN — Medication 1000 MILLIGRAM(S): at 10:00

## 2017-09-01 RX ADMIN — Medication 2 SPRAY(S): at 12:44

## 2017-09-01 RX ADMIN — Medication 50 MILLIGRAM(S): at 06:05

## 2017-09-01 RX ADMIN — Medication 1000 MILLIGRAM(S): at 01:59

## 2017-09-01 RX ADMIN — OXYCODONE HYDROCHLORIDE 10 MILLIGRAM(S): 5 TABLET ORAL at 16:20

## 2017-09-01 RX ADMIN — OXYCODONE HYDROCHLORIDE 10 MILLIGRAM(S): 5 TABLET ORAL at 16:04

## 2017-09-01 RX ADMIN — LIDOCAINE 1 PATCH: 4 CREAM TOPICAL at 08:38

## 2017-09-01 RX ADMIN — PANTOPRAZOLE SODIUM 40 MILLIGRAM(S): 20 TABLET, DELAYED RELEASE ORAL at 05:40

## 2017-09-01 NOTE — PROGRESS NOTE ADULT - PROBLEM SELECTOR PLAN 1
S/P right Total knee replacement.  staged.   Continue with pain management, DVT proph, and wound care as per Ortho recommendation.    C/W PT/OT
S/P right Total knee replacement.  staged.   Continue with pain management, DVT proph, and wound care as per Ortho recommendation.   Pre- operative clearence-  Ortho is planning for Stage 2 left TKR for tomorrow- pt is medically optimized , and is at acceptable risk for the Needs better BP control patrick-operatively.   C/W PT/OT
S/P right Total knee replacement.  staged.   s/p left TKNR day 0, no acute complaints   Continue with pain management, DVT proph, and wound care as per Ortho recommendation.
S/P right Total knee replacement.  staged.   s/p left TKNR day 2, complaining of left leg pain, will give one time breakthrough morphine   Continue with pain management, DVT proph, and wound care as per Ortho recommendation.
S/P right Total knee replacement.  staged.   s/p left TKNR day 3, complaining of left leg pain, poorly controlled  will give one time oxy, as pt has minimum relif with dilaudid  pain manamgnet toritoal
S/P right Total knee replacement.  staged.   s/p left TKNR day 4  pain better controlled with oxycodone  d/c planning
S/P right Total knee replacement.  staged.   s/p left TKNR day 4  pain better controlled with oxycodone  d/c planning
S/P right Total knee replacement.  staged. Continue with pain management, DVT proph, and wound care as per Ortho.  PT/OT
S/P right Total knee replacement.  Continue with pain management, DVT proph, and wound care as per Ortho.  PT/OT

## 2017-09-01 NOTE — PROGRESS NOTE ADULT - PROBLEM SELECTOR PROBLEM 3
Gastroesophageal reflux disease, esophagitis presence not specified

## 2017-09-01 NOTE — PROGRESS NOTE ADULT - PROBLEM SELECTOR PROBLEM 1
Primary localized osteoarthritis of right knee

## 2017-09-01 NOTE — PROGRESS NOTE ADULT - PROBLEM SELECTOR PLAN 2
Continue with Advair, albuterol, and Incentive spirometry.
Continue with Advair, albuterol, and Incentive spirometry.
Stable ,   Continue with Advair, albuterol, and Incentive spirometry.
Continue with Advair, albuterol, and Incentive spirometry.

## 2017-09-01 NOTE — PROGRESS NOTE ADULT - PROBLEM SELECTOR PLAN 3
Continue with PPI.
Continue with PPI.
Stable , Continue with PPI.
Continue with PPI.

## 2017-09-01 NOTE — PROGRESS NOTE ADULT - PROBLEM SELECTOR PLAN 6
VS subconjunctival hemorrhage.  opth recommended to d/c topical antibiotic.  outpatient follow up with opth.
VS subconjunctival hemorrhage.  Antibiotic eye drop, and opth consult.

## 2017-09-01 NOTE — PROGRESS NOTE ADULT - SUBJECTIVE AND OBJECTIVE BOX
ORTHOPEDIC PA PROGRESS NOTE  ALIYA MCNEILL      72y Female                                                                                                                               POD #   4    STATUS POST:               Pre-Op Dx: Primary localized osteoarthritis of left knee  Primary osteoarthritis of left knee    Post-Op Dx:  Primary localized osteoarthritis of left knee  Primary osteoarthritis of right knee    Procedure: Knee replacement  Right knee replacement                                                Pain (0-10):  Pt reports 5  Current Pain Management:  [ ] PCA   [ x] Po Analgesics [ ] IM /IV Anagesics     T(F): 98.2  HR: 90  BP: 122/62  RR: 17  SpO2: 96%               Physical Exam :    -   Wound with prineo C/D/I.   -   Distal Neurvascular status intact grossly.   -   Warm well perfused; capillary refill <3 seconds   -   (+)EHL/FHL   -   (+) Sensation to light touch  -   (-) Calf tenderness Bilaterally    A/P: 72y Female s/p Knee replacement  Right knee replacement     -   Ortho Stable  -   Pain control   -   Medicine to follow  -   DVT ppx:     [x]SCDs     [ ] ASA     [x] Eliquis     [ ] Lovenox  -   Weight bearing status:  WBAT [x]        PWB    [ ]     TTWB  [ ]      NWB  [ ]   -  Dispo:     Home [ ]     Acute Rehab [ ]     [facility] [x]     TBD [ ]

## 2017-09-01 NOTE — PROGRESS NOTE ADULT - PROBLEM SELECTOR PLAN 4
Continue with statin

## 2017-09-01 NOTE — PROGRESS NOTE ADULT - PROBLEM SELECTOR PROBLEM 4
Hyperlipidemia, unspecified hyperlipidemia type

## 2017-09-01 NOTE — PROGRESS NOTE ADULT - SUBJECTIVE AND OBJECTIVE BOX
Patient is a 72y old  Female who presents with a chief complaint of S/P Right TKR - 8/23/17 & Left TKR 8/28/17 Dr. MASOUD [facility] (24 Aug 2017 10:26)        HPI:      SUBJECTIVE & OBJECTIVE: Pt seen and examined at bedside.     PHYSICAL EXAM:  T(C): 36.8 (09-01-17 @ 07:45), Max: 36.8 (08-31-17 @ 15:20)  HR: 90 (09-01-17 @ 07:45) (69 - 90)  BP: 122/62 (09-01-17 @ 07:45) (99/65 - 122/62)  RR: 17 (09-01-17 @ 07:45) (16 - 17)  SpO2: 96% (09-01-17 @ 07:45) (96% - 97%)  Wt(kg): --   GENERAL: NAD, well-groomed, well-developed  HEAD:  Atraumatic, Normocephalic  EYES: EOMI, PERRLA, conjunctiva and sclera clear  ENMT: Moist mucous membranes  NECK: Supple, No JVD  NERVOUS SYSTEM:  Alert & Oriented X3, Motor Strength 5/5 B/L upper and lower extremities; DTRs 2+ intact and symmetric  CHEST/LUNG: Clear to auscultation bilaterally; No rales, rhonchi, wheezing, or rubs  HEART: Regular rate and rhythm; No murmurs, rubs, or gallops  ABDOMEN: Soft, Nontender, Nondistended; Bowel sounds present  EXTREMITIES:  2+ Peripheral Pulses, No clubbing, cyanosis, or edema        MEDICATIONS  (STANDING):  fluticasone propionate 50 MICROgram(s)/spray Nasal Spray 2 Spray(s) Both Nostrils daily  lactated ringers. 1000 milliLiter(s) (75 mL/Hr) IV Continuous <Continuous>  pantoprazole    Tablet 40 milliGRAM(s) Oral before breakfast  senna 2 Tablet(s) Oral at bedtime  docusate sodium 100 milliGRAM(s) Oral three times a day  apixaban 2.5 milliGRAM(s) Oral every 12 hours  celecoxib 200 milliGRAM(s) Oral two times a day with meals  aspirin  chewable 81 milliGRAM(s) Oral daily  topiramate 50 milliGRAM(s) Oral two times a day  acetaminophen   Tablet. 1000 milliGRAM(s) Oral every 8 hours  atorvastatin 40 milliGRAM(s) Oral at bedtime  ATENolol  Tablet 50 milliGRAM(s) Oral two times a day  buDESOnide 160 MICROgram(s)/formoterol 4.5 MICROgram(s) Inhaler 2 Puff(s) Inhalation two times a day  fluocinonide 0.05% Cream 1 Application(s) Topical two times a day  montelukast 10 milliGRAM(s) Oral daily    MEDICATIONS  (PRN):  aluminum hydroxide/magnesium hydroxide/simethicone Suspension 30 milliLiter(s) Oral four times a day PRN Indigestion  bisacodyl Suppository 10 milliGRAM(s) Rectal daily PRN If no bowel movement by postoperative day #2  ALBUTerol    90 MICROgram(s) HFA Inhaler 2 Puff(s) Inhalation every 6 hours PRN Shortness of Breath and/or Wheezing  aluminum hydroxide/magnesium hydroxide/simethicone Suspension 30 milliLiter(s) Oral four times a day PRN Indigestion  ondansetron Injectable 4 milliGRAM(s) IV Push every 6 hours PRN Nausea and/or Vomiting  magnesium hydroxide Suspension 30 milliLiter(s) Oral daily PRN Constipation  polyethylene glycol 3350 17 Gram(s) Oral daily PRN Constipation  bisacodyl Suppository 10 milliGRAM(s) Rectal daily PRN If no bowel movement by postoperative day #2  HYDROmorphone  Injectable 0.5 milliGRAM(s) IV Push every 3 hours PRN Severe Pain (7 - 10)  loratadine 10 milliGRAM(s) Oral daily PRN allergies  diazepam    Tablet 5 milliGRAM(s) Oral every 8 hours PRN muscle spasm  lidocaine   Patch 1 Patch Transdermal daily PRN Pain  oxyCODONE    IR 10 milliGRAM(s) Oral every 3 hours PRN Moderate Pain (4 - 6)      LABS:                        9.8    6.1   )-----------( 189      ( 31 Aug 2017 06:31 )             29.0     08-31    141  |  107  |  16  ----------------------------<  114<H>  4.6   |  26  |  0.76    Ca    9.3      31 Aug 2017 06:31            CAPILLARY BLOOD GLUCOSE          CAPILLARY BLOOD GLUCOSE        CAPILLARY BLOOD GLUCOSE                RECENT CULTURES:      RADIOLOGY & ADDITIONAL TESTS:                        DVT/GI ppx  Discussed with pt @ bedside

## 2017-09-01 NOTE — PROGRESS NOTE ADULT - PROBLEM SELECTOR PLAN 5
Continue with atenolol,   Monitor BP closely. .
Continue with atenolol, and Monitor BP.
Continue with atenolol, and Monitor BP.

## 2017-09-01 NOTE — PROGRESS NOTE ADULT - PROBLEM SELECTOR PROBLEM 6
Conjunctivitis

## 2017-09-01 NOTE — PROGRESS NOTE ADULT - PROBLEM SELECTOR PROBLEM 2
Uncomplicated asthma, unspecified asthma severity

## 2017-09-05 ENCOUNTER — APPOINTMENT (OUTPATIENT)
Dept: ORTHOPEDIC SURGERY | Facility: CLINIC | Age: 72
End: 2017-09-05

## 2017-09-29 ENCOUNTER — APPOINTMENT (OUTPATIENT)
Dept: ORTHOPEDIC SURGERY | Facility: CLINIC | Age: 72
End: 2017-09-29
Payer: MEDICARE

## 2017-09-29 VITALS
DIASTOLIC BLOOD PRESSURE: 79 MMHG | HEIGHT: 56 IN | WEIGHT: 162 LBS | HEART RATE: 71 BPM | BODY MASS INDEX: 36.44 KG/M2 | SYSTOLIC BLOOD PRESSURE: 117 MMHG

## 2017-09-29 PROCEDURE — 99024 POSTOP FOLLOW-UP VISIT: CPT

## 2017-09-29 PROCEDURE — 73562 X-RAY EXAM OF KNEE 3: CPT | Mod: RT

## 2017-10-23 ENCOUNTER — APPOINTMENT (OUTPATIENT)
Dept: ORTHOPEDIC SURGERY | Facility: CLINIC | Age: 72
End: 2017-10-23
Payer: MEDICARE

## 2017-10-23 VITALS
HEART RATE: 65 BPM | DIASTOLIC BLOOD PRESSURE: 64 MMHG | BODY MASS INDEX: 36.44 KG/M2 | SYSTOLIC BLOOD PRESSURE: 132 MMHG | HEIGHT: 56 IN | WEIGHT: 162 LBS

## 2017-10-23 PROCEDURE — 73562 X-RAY EXAM OF KNEE 3: CPT | Mod: 50

## 2017-10-23 PROCEDURE — 99024 POSTOP FOLLOW-UP VISIT: CPT

## 2017-11-14 ENCOUNTER — EMERGENCY (EMERGENCY)
Facility: HOSPITAL | Age: 72
LOS: 1 days | Discharge: HOME | End: 2017-11-14
Attending: EMERGENCY MEDICINE | Admitting: EMERGENCY MEDICINE
Payer: MEDICARE

## 2017-11-14 VITALS
TEMPERATURE: 98 F | DIASTOLIC BLOOD PRESSURE: 65 MMHG | SYSTOLIC BLOOD PRESSURE: 130 MMHG | RESPIRATION RATE: 16 BRPM | HEART RATE: 75 BPM | OXYGEN SATURATION: 98 %

## 2017-11-14 VITALS
TEMPERATURE: 98 F | DIASTOLIC BLOOD PRESSURE: 82 MMHG | RESPIRATION RATE: 17 BRPM | HEART RATE: 57 BPM | OXYGEN SATURATION: 98 % | SYSTOLIC BLOOD PRESSURE: 138 MMHG

## 2017-11-14 DIAGNOSIS — Z98.890 OTHER SPECIFIED POSTPROCEDURAL STATES: Chronic | ICD-10-CM

## 2017-11-14 DIAGNOSIS — Z87.19 PERSONAL HISTORY OF OTHER DISEASES OF THE DIGESTIVE SYSTEM: Chronic | ICD-10-CM

## 2017-11-14 DIAGNOSIS — Z90.12 ACQUIRED ABSENCE OF LEFT BREAST AND NIPPLE: Chronic | ICD-10-CM

## 2017-11-14 LAB
ALBUMIN SERPL ELPH-MCNC: 4.4 G/DL — SIGNIFICANT CHANGE UP (ref 3.3–5)
ALP SERPL-CCNC: 77 U/L — SIGNIFICANT CHANGE UP (ref 40–120)
ALT FLD-CCNC: 24 U/L RC — SIGNIFICANT CHANGE UP (ref 10–45)
ANION GAP SERPL CALC-SCNC: 14 MMOL/L — SIGNIFICANT CHANGE UP (ref 5–17)
APPEARANCE UR: CLEAR — SIGNIFICANT CHANGE UP
AST SERPL-CCNC: 26 U/L — SIGNIFICANT CHANGE UP (ref 10–40)
BASE EXCESS BLDV CALC-SCNC: -1.9 MMOL/L — SIGNIFICANT CHANGE UP (ref -2–2)
BASOPHILS # BLD AUTO: 0 K/UL — SIGNIFICANT CHANGE UP (ref 0–0.2)
BASOPHILS NFR BLD AUTO: 0.7 % — SIGNIFICANT CHANGE UP (ref 0–2)
BILIRUB SERPL-MCNC: 0.1 MG/DL — LOW (ref 0.2–1.2)
BILIRUB UR-MCNC: NEGATIVE — SIGNIFICANT CHANGE UP
BUN SERPL-MCNC: 15 MG/DL — SIGNIFICANT CHANGE UP (ref 7–23)
CA-I SERPL-SCNC: 1.33 MMOL/L — HIGH (ref 1.12–1.3)
CALCIUM SERPL-MCNC: 9.9 MG/DL — SIGNIFICANT CHANGE UP (ref 8.4–10.5)
CHLORIDE BLDV-SCNC: 111 MMOL/L — HIGH (ref 96–108)
CHLORIDE SERPL-SCNC: 109 MMOL/L — HIGH (ref 96–108)
CO2 BLDV-SCNC: 25 MMOL/L — SIGNIFICANT CHANGE UP (ref 22–30)
CO2 SERPL-SCNC: 22 MMOL/L — SIGNIFICANT CHANGE UP (ref 22–31)
COLOR SPEC: SIGNIFICANT CHANGE UP
CREAT SERPL-MCNC: 0.74 MG/DL — SIGNIFICANT CHANGE UP (ref 0.5–1.3)
DIFF PNL FLD: NEGATIVE — SIGNIFICANT CHANGE UP
EOSINOPHIL # BLD AUTO: 0.1 K/UL — SIGNIFICANT CHANGE UP (ref 0–0.5)
EOSINOPHIL NFR BLD AUTO: 1.2 % — SIGNIFICANT CHANGE UP (ref 0–6)
EPI CELLS # UR: SIGNIFICANT CHANGE UP /HPF
GAS PNL BLDV: 143 MMOL/L — SIGNIFICANT CHANGE UP (ref 136–145)
GAS PNL BLDV: SIGNIFICANT CHANGE UP
GAS PNL BLDV: SIGNIFICANT CHANGE UP
GLUCOSE BLDV-MCNC: 104 MG/DL — HIGH (ref 70–99)
GLUCOSE SERPL-MCNC: 99 MG/DL — SIGNIFICANT CHANGE UP (ref 70–99)
GLUCOSE UR QL: NEGATIVE — SIGNIFICANT CHANGE UP
HCO3 BLDV-SCNC: 23 MMOL/L — SIGNIFICANT CHANGE UP (ref 21–29)
HCT VFR BLD CALC: 40.1 % — SIGNIFICANT CHANGE UP (ref 34.5–45)
HCT VFR BLDA CALC: 41 % — SIGNIFICANT CHANGE UP (ref 39–50)
HGB BLD CALC-MCNC: 13.4 G/DL — SIGNIFICANT CHANGE UP (ref 11.5–15.5)
HGB BLD-MCNC: 13.1 G/DL — SIGNIFICANT CHANGE UP (ref 11.5–15.5)
KETONES UR-MCNC: NEGATIVE — SIGNIFICANT CHANGE UP
LACTATE BLDV-MCNC: 1.1 MMOL/L — SIGNIFICANT CHANGE UP (ref 0.7–2)
LEUKOCYTE ESTERASE UR-ACNC: NEGATIVE — SIGNIFICANT CHANGE UP
LIDOCAIN IGE QN: 32 U/L — SIGNIFICANT CHANGE UP (ref 7–60)
LYMPHOCYTES # BLD AUTO: 1.8 K/UL — SIGNIFICANT CHANGE UP (ref 1–3.3)
LYMPHOCYTES # BLD AUTO: 30.9 % — SIGNIFICANT CHANGE UP (ref 13–44)
MCHC RBC-ENTMCNC: 30.6 PG — SIGNIFICANT CHANGE UP (ref 27–34)
MCHC RBC-ENTMCNC: 32.7 GM/DL — SIGNIFICANT CHANGE UP (ref 32–36)
MCV RBC AUTO: 93.4 FL — SIGNIFICANT CHANGE UP (ref 80–100)
MONOCYTES # BLD AUTO: 0.6 K/UL — SIGNIFICANT CHANGE UP (ref 0–0.9)
MONOCYTES NFR BLD AUTO: 11.1 % — SIGNIFICANT CHANGE UP (ref 2–14)
NEUTROPHILS # BLD AUTO: 3.3 K/UL — SIGNIFICANT CHANGE UP (ref 1.8–7.4)
NEUTROPHILS NFR BLD AUTO: 56.1 % — SIGNIFICANT CHANGE UP (ref 43–77)
NITRITE UR-MCNC: NEGATIVE — SIGNIFICANT CHANGE UP
PCO2 BLDV: 44 MMHG — SIGNIFICANT CHANGE UP (ref 35–50)
PH BLDV: 7.34 — LOW (ref 7.35–7.45)
PH UR: 6 — SIGNIFICANT CHANGE UP (ref 5–8)
PLATELET # BLD AUTO: 195 K/UL — SIGNIFICANT CHANGE UP (ref 150–400)
PO2 BLDV: 21 MMHG — LOW (ref 25–45)
POTASSIUM BLDV-SCNC: 3.6 MMOL/L — SIGNIFICANT CHANGE UP (ref 3.5–5)
POTASSIUM SERPL-MCNC: 3.8 MMOL/L — SIGNIFICANT CHANGE UP (ref 3.5–5.3)
POTASSIUM SERPL-SCNC: 3.8 MMOL/L — SIGNIFICANT CHANGE UP (ref 3.5–5.3)
PROT SERPL-MCNC: 6.8 G/DL — SIGNIFICANT CHANGE UP (ref 6–8.3)
PROT UR-MCNC: NEGATIVE — SIGNIFICANT CHANGE UP
RBC # BLD: 4.29 M/UL — SIGNIFICANT CHANGE UP (ref 3.8–5.2)
RBC # FLD: 12.1 % — SIGNIFICANT CHANGE UP (ref 10.3–14.5)
SAO2 % BLDV: 29 % — LOW (ref 67–88)
SODIUM SERPL-SCNC: 145 MMOL/L — SIGNIFICANT CHANGE UP (ref 135–145)
SP GR SPEC: 1.01 — LOW (ref 1.01–1.02)
UROBILINOGEN FLD QL: NEGATIVE — SIGNIFICANT CHANGE UP
WBC # BLD: 5.8 K/UL — SIGNIFICANT CHANGE UP (ref 3.8–10.5)
WBC # FLD AUTO: 5.8 K/UL — SIGNIFICANT CHANGE UP (ref 3.8–10.5)

## 2017-11-14 PROCEDURE — 82803 BLOOD GASES ANY COMBINATION: CPT

## 2017-11-14 PROCEDURE — 82435 ASSAY OF BLOOD CHLORIDE: CPT

## 2017-11-14 PROCEDURE — 83605 ASSAY OF LACTIC ACID: CPT

## 2017-11-14 PROCEDURE — 85027 COMPLETE CBC AUTOMATED: CPT

## 2017-11-14 PROCEDURE — 81001 URINALYSIS AUTO W/SCOPE: CPT

## 2017-11-14 PROCEDURE — 74177 CT ABD & PELVIS W/CONTRAST: CPT | Mod: 26

## 2017-11-14 PROCEDURE — 82565 ASSAY OF CREATININE: CPT

## 2017-11-14 PROCEDURE — 82947 ASSAY GLUCOSE BLOOD QUANT: CPT

## 2017-11-14 PROCEDURE — 83690 ASSAY OF LIPASE: CPT

## 2017-11-14 PROCEDURE — 96374 THER/PROPH/DIAG INJ IV PUSH: CPT | Mod: XU

## 2017-11-14 PROCEDURE — 84132 ASSAY OF SERUM POTASSIUM: CPT

## 2017-11-14 PROCEDURE — 99285 EMERGENCY DEPT VISIT HI MDM: CPT

## 2017-11-14 PROCEDURE — 84295 ASSAY OF SERUM SODIUM: CPT

## 2017-11-14 PROCEDURE — 99284 EMERGENCY DEPT VISIT MOD MDM: CPT | Mod: 25

## 2017-11-14 PROCEDURE — 80053 COMPREHEN METABOLIC PANEL: CPT

## 2017-11-14 PROCEDURE — 82330 ASSAY OF CALCIUM: CPT

## 2017-11-14 PROCEDURE — 74177 CT ABD & PELVIS W/CONTRAST: CPT

## 2017-11-14 PROCEDURE — 85014 HEMATOCRIT: CPT

## 2017-11-14 RX ORDER — SODIUM CHLORIDE 9 MG/ML
1000 INJECTION INTRAMUSCULAR; INTRAVENOUS; SUBCUTANEOUS ONCE
Qty: 0 | Refills: 0 | Status: COMPLETED | OUTPATIENT
Start: 2017-11-14 | End: 2017-11-14

## 2017-11-14 RX ORDER — ACETAMINOPHEN 500 MG
1000 TABLET ORAL ONCE
Qty: 0 | Refills: 0 | Status: COMPLETED | OUTPATIENT
Start: 2017-11-14 | End: 2017-11-14

## 2017-11-14 RX ADMIN — Medication 1000 MILLIGRAM(S): at 19:28

## 2017-11-14 RX ADMIN — Medication 400 MILLIGRAM(S): at 18:37

## 2017-11-14 RX ADMIN — SODIUM CHLORIDE 1000 MILLILITER(S): 9 INJECTION INTRAMUSCULAR; INTRAVENOUS; SUBCUTANEOUS at 15:45

## 2017-11-14 NOTE — ED PROVIDER NOTE - ATTENDING CONTRIBUTION TO CARE
73 y/o f with pmhx HTN, HLD Malignant neoplasm of breast presents with  for pain in abd.Sstarted approx 5 days ago and was started on abx Cipro and Flagyl by her pmd Dr. Cisneros. continued to have abd pain and dark stools. today was referred to Urgent care who sent patient to ER for further eval. no vomiting no diarrhea. also notes dark urine.   Gen. no acute distress, Non toxic   HEENT: EOMI, mmm,   Lungs: CTAB/L no C/ W /R   CVS: S1S2   Abd; Soft  diffusely tend greatest tenderness in rlq, no llq tend. no rakesh, non distended   Ext: no edema   Neuro AAOx3 non focal clear speech

## 2017-11-14 NOTE — ED PROVIDER NOTE - PLAN OF CARE
Management of acute pain Please take OTC Tylenol as needed for the abdomen pain. CT scan of the abdomen and pelvis was negative for any acute diverticulitis, colitis, bowel obstruction or obstructive uropathy.

## 2017-11-14 NOTE — ED PROVIDER NOTE - PROGRESS NOTE DETAILS
Rectal exam performed w/ RN chaperone Stool guaiac negative, Stool brown in color no melena. CT abdomen pelvis w/ oral and IV contrast negative for acute  diverticulitis,  colitis,  bowel  obstruction,  or obstructive  uropathy.

## 2017-11-14 NOTE — ED ADULT NURSE NOTE - OBJECTIVE STATEMENT
71 y/o female a+ox3, pmhx diverticulitis, breast CA, GERD, hiatal hernia, HTN, HLD, asthma, migraines, hemorrhoids, ambulatory from home c/o abdominal pain x10 days. Pain localized to lower right quadrant; non-radiating, currently 5/10, mild increase with palpation. Pt taking home prescribed antibiotics x10 days stating the medication was given due to pmhx of diverticulitis; no change in pain over 10 days. Pt denies fever or chills, nausea or vomiting. Pt confirms dark stool and urine as well; no noted hematuria or melena, no burning or pain with urination. Pt currently has hemorrhoids and states she occasionally has red blood on tissue but no reports of active bleeding. Pt currently denies any chest pain or discomfort, headache, feeling lightheaded, dizziness, difficulty breathing, diarrhea, constipation, fever or chills, recent falls or trauma. Abdominal assessment soft with mild tenderness of RLQ on palpation; no noted deformities or discoloration. VS documented, IV established, labs drawn. Pt left in position of comfort.

## 2017-11-14 NOTE — ED PROVIDER NOTE - MEDICAL DECISION MAKING DETAILS
ATTD: abd pain concern for perf diverticulitis, obstruction / biliary cause, check labs, check ct a.p, ivf, pain medication, re eval for dispo

## 2017-11-14 NOTE — ED ADULT NURSE REASSESSMENT NOTE - NS ED NURSE REASSESS COMMENT FT1
Pt left for CT  with transporter; resting in position of comfort; does not appear to be in any severe pain or distress.
Pt resting calm and comfortably. A&ox4, awaiting possible discharge. No complaints at this time. Will continue to monitor.

## 2017-11-14 NOTE — ED PROVIDER NOTE - SKIN, MLM
Skin normal color for race, warm, dry and intact. No evidence of rash. +operative scars bilateral knees

## 2017-11-14 NOTE — ED PROVIDER NOTE - OBJECTIVE STATEMENT
72F hx diverticulitis, breast CA s/p RT+chemo +lumpectomy 1998, HTN, HLD, GERD, chronic headaches, colon polyps(colonoscopy october 2014 Dr. Bo Boyd) asthma, UTI. Presents to the ED w/ abdomen pain and black stools for 10days. Patient initially called PCP Rajat Broderick MD 10 days ago for abdomen pain pt advised to seek medical attention but refused and was rx'd cipro+metronidazole as it was similar presentation of pt's hx of diverticulitis, PCP unaware of dark stools. Patient is taking aspirin 81mg and occasionally takes meloxicam for joint pain.   Her abdomen pain is 5/10 in intensity non radiating located in right lower quadrant similar to the abdomen pain she had w/ diverticulitis episodes.   Patient also states she has had brown urine and increased urinary frequency. Denies nausea, vomiting, palpations, diarrhea, palpations.     Steven Goldberg MD is pts cardiologist.     current meds as per PCP: topamax 50mg BID, Advair, lipator, meloxicam, ibuprofen, meclizine, atenolol 72F hx diverticulitis, breast CA s/p RT+chemo +lumpectomy 1998, HTN, HLD, GERD, chronic headaches, colon polyps(colonoscopy october 2014 Dr. Bo Boyd) asthma, UTI. Presents to the ED w/ abdomen pain and black stools for 10days. Patient initially called PCP Rajat Broderick MD 10 days ago for abdomen pain pt advised to seek medical attention but refused and was rx'd cipro+metronidazole as it was similar presentation of pt's hx of diverticulitis, PCP unaware of dark stools. Patient is taking aspirin 81mg and occasionally takes meloxicam for joint pain.   Her abdomen pain is 5/10 in intensity non radiating located in right lower quadrant similar to the abdomen pain she had w/ diverticulitis episodes.   Patient also states she has had brown urine and increased urinary frequency. Denies nausea, vomiting, palpations, diarrhea, palpations.     Steven Goldberg MD is pts cardiologist.   Bo Boyd is pts gastroenterologist     current meds as per PCP: topamax 50mg BID, Advair, lipator, meloxicam, ibuprofen, meclizine, atenolol

## 2017-11-14 NOTE — ED PROVIDER NOTE - CARE PLAN
Principal Discharge DX:	Abdominal pain  Goal:	Management of acute pain  Instructions for follow-up, activity and diet:	Please take OTC Tylenol as needed for the abdomen pain. CT scan of the abdomen and pelvis was negative for any acute diverticulitis, colitis, bowel obstruction or obstructive uropathy.

## 2018-01-16 ENCOUNTER — APPOINTMENT (OUTPATIENT)
Dept: ORTHOPEDIC SURGERY | Facility: CLINIC | Age: 73
End: 2018-01-16
Payer: MEDICARE

## 2018-01-16 VITALS
WEIGHT: 162 LBS | HEART RATE: 61 BPM | SYSTOLIC BLOOD PRESSURE: 132 MMHG | HEIGHT: 56 IN | DIASTOLIC BLOOD PRESSURE: 67 MMHG | BODY MASS INDEX: 36.44 KG/M2

## 2018-01-16 PROCEDURE — 99213 OFFICE O/P EST LOW 20 MIN: CPT

## 2018-01-16 PROCEDURE — 73562 X-RAY EXAM OF KNEE 3: CPT | Mod: LT

## 2018-09-07 ENCOUNTER — APPOINTMENT (OUTPATIENT)
Dept: ORTHOPEDIC SURGERY | Facility: CLINIC | Age: 73
End: 2018-09-07
Payer: MEDICARE

## 2018-09-07 DIAGNOSIS — M65.351 TRIGGER FINGER, RIGHT LITTLE FINGER: ICD-10-CM

## 2018-09-07 PROCEDURE — 20550 NJX 1 TENDON SHEATH/LIGAMENT: CPT | Mod: F9

## 2018-09-07 PROCEDURE — 99214 OFFICE O/P EST MOD 30 MIN: CPT | Mod: 25

## 2018-09-28 ENCOUNTER — APPOINTMENT (OUTPATIENT)
Dept: ORTHOPEDIC SURGERY | Facility: CLINIC | Age: 73
End: 2018-09-28

## 2018-10-02 ENCOUNTER — APPOINTMENT (OUTPATIENT)
Dept: ORTHOPEDIC SURGERY | Facility: CLINIC | Age: 73
End: 2018-10-02
Payer: MEDICARE

## 2018-10-02 VITALS
SYSTOLIC BLOOD PRESSURE: 145 MMHG | BODY MASS INDEX: 36.44 KG/M2 | HEART RATE: 54 BPM | WEIGHT: 162 LBS | HEIGHT: 56 IN | DIASTOLIC BLOOD PRESSURE: 80 MMHG

## 2018-10-02 PROCEDURE — 99213 OFFICE O/P EST LOW 20 MIN: CPT

## 2018-10-02 PROCEDURE — 73502 X-RAY EXAM HIP UNI 2-3 VIEWS: CPT

## 2018-10-02 RX ORDER — MELOXICAM 7.5 MG/1
7.5 TABLET ORAL DAILY
Qty: 60 | Refills: 1 | Status: DISCONTINUED | COMMUNITY
Start: 2017-10-23 | End: 2018-10-02

## 2018-10-02 RX ORDER — MELOXICAM 15 MG/1
TABLET ORAL
Refills: 0 | Status: DISCONTINUED | COMMUNITY
End: 2018-10-02

## 2018-10-14 ENCOUNTER — FORM ENCOUNTER (OUTPATIENT)
Age: 73
End: 2018-10-14

## 2018-10-15 ENCOUNTER — APPOINTMENT (OUTPATIENT)
Dept: ULTRASOUND IMAGING | Facility: CLINIC | Age: 73
End: 2018-10-15
Payer: MEDICARE

## 2018-10-15 ENCOUNTER — OUTPATIENT (OUTPATIENT)
Dept: OUTPATIENT SERVICES | Facility: HOSPITAL | Age: 73
LOS: 1 days | End: 2018-10-15
Payer: MEDICARE

## 2018-10-15 DIAGNOSIS — Z00.8 ENCOUNTER FOR OTHER GENERAL EXAMINATION: ICD-10-CM

## 2018-10-15 DIAGNOSIS — Z98.890 OTHER SPECIFIED POSTPROCEDURAL STATES: Chronic | ICD-10-CM

## 2018-10-15 DIAGNOSIS — Z90.12 ACQUIRED ABSENCE OF LEFT BREAST AND NIPPLE: Chronic | ICD-10-CM

## 2018-10-15 DIAGNOSIS — Z87.19 PERSONAL HISTORY OF OTHER DISEASES OF THE DIGESTIVE SYSTEM: Chronic | ICD-10-CM

## 2018-10-15 PROCEDURE — 20611 DRAIN/INJ JOINT/BURSA W/US: CPT | Mod: RT

## 2018-10-15 PROCEDURE — 20611 DRAIN/INJ JOINT/BURSA W/US: CPT

## 2018-11-12 ENCOUNTER — APPOINTMENT (OUTPATIENT)
Dept: ORTHOPEDIC SURGERY | Facility: HOSPITAL | Age: 73
End: 2018-11-12

## 2019-06-23 ENCOUNTER — EMERGENCY (EMERGENCY)
Facility: HOSPITAL | Age: 74
LOS: 1 days | Discharge: HOME | End: 2019-06-23
Attending: EMERGENCY MEDICINE
Payer: MEDICARE

## 2019-06-23 VITALS
RESPIRATION RATE: 20 BRPM | WEIGHT: 139.99 LBS | OXYGEN SATURATION: 94 % | SYSTOLIC BLOOD PRESSURE: 172 MMHG | HEIGHT: 58 IN | DIASTOLIC BLOOD PRESSURE: 87 MMHG | TEMPERATURE: 102 F | HEART RATE: 86 BPM

## 2019-06-23 DIAGNOSIS — Z90.12 ACQUIRED ABSENCE OF LEFT BREAST AND NIPPLE: Chronic | ICD-10-CM

## 2019-06-23 DIAGNOSIS — Z87.19 PERSONAL HISTORY OF OTHER DISEASES OF THE DIGESTIVE SYSTEM: Chronic | ICD-10-CM

## 2019-06-23 DIAGNOSIS — Z98.890 OTHER SPECIFIED POSTPROCEDURAL STATES: Chronic | ICD-10-CM

## 2019-06-23 LAB
ALBUMIN SERPL ELPH-MCNC: 4.1 G/DL — SIGNIFICANT CHANGE UP (ref 3.3–5)
ALP SERPL-CCNC: 66 U/L — SIGNIFICANT CHANGE UP (ref 40–120)
ALT FLD-CCNC: 22 U/L — SIGNIFICANT CHANGE UP (ref 10–45)
ANION GAP SERPL CALC-SCNC: 12 MMOL/L — SIGNIFICANT CHANGE UP (ref 5–17)
APTT BLD: 22.4 SEC — LOW (ref 27.5–36.3)
AST SERPL-CCNC: 26 U/L — SIGNIFICANT CHANGE UP (ref 10–40)
BASOPHILS # BLD AUTO: 0.1 K/UL — SIGNIFICANT CHANGE UP (ref 0–0.2)
BASOPHILS NFR BLD AUTO: 1.4 % — SIGNIFICANT CHANGE UP (ref 0–2)
BILIRUB SERPL-MCNC: 0.5 MG/DL — SIGNIFICANT CHANGE UP (ref 0.2–1.2)
BUN SERPL-MCNC: 18 MG/DL — SIGNIFICANT CHANGE UP (ref 7–23)
CALCIUM SERPL-MCNC: 9.8 MG/DL — SIGNIFICANT CHANGE UP (ref 8.4–10.5)
CHLORIDE SERPL-SCNC: 102 MMOL/L — SIGNIFICANT CHANGE UP (ref 96–108)
CO2 SERPL-SCNC: 27 MMOL/L — SIGNIFICANT CHANGE UP (ref 22–31)
CREAT SERPL-MCNC: 0.74 MG/DL — SIGNIFICANT CHANGE UP (ref 0.5–1.3)
EOSINOPHIL # BLD AUTO: 0.2 K/UL — SIGNIFICANT CHANGE UP (ref 0–0.5)
EOSINOPHIL NFR BLD AUTO: 1.7 % — SIGNIFICANT CHANGE UP (ref 0–6)
GLUCOSE SERPL-MCNC: 146 MG/DL — HIGH (ref 70–99)
HCT VFR BLD CALC: 39.9 % — SIGNIFICANT CHANGE UP (ref 34.5–45)
HGB BLD-MCNC: 14.1 G/DL — SIGNIFICANT CHANGE UP (ref 11.5–15.5)
INR BLD: 1 RATIO — SIGNIFICANT CHANGE UP (ref 0.88–1.16)
LACTATE BLDV-MCNC: 2.2 MMOL/L — HIGH (ref 0.7–2)
LYMPHOCYTES # BLD AUTO: 0.3 K/UL — LOW (ref 1–3.3)
LYMPHOCYTES # BLD AUTO: 3.1 % — LOW (ref 13–44)
MCHC RBC-ENTMCNC: 32.6 PG — SIGNIFICANT CHANGE UP (ref 27–34)
MCHC RBC-ENTMCNC: 35.3 GM/DL — SIGNIFICANT CHANGE UP (ref 32–36)
MCV RBC AUTO: 92.3 FL — SIGNIFICANT CHANGE UP (ref 80–100)
MONOCYTES # BLD AUTO: 0.4 K/UL — SIGNIFICANT CHANGE UP (ref 0–0.9)
MONOCYTES NFR BLD AUTO: 3.8 % — SIGNIFICANT CHANGE UP (ref 2–14)
NEUTROPHILS # BLD AUTO: 8.3 K/UL — HIGH (ref 1.8–7.4)
NEUTROPHILS NFR BLD AUTO: 90 % — HIGH (ref 43–77)
PLATELET # BLD AUTO: 158 K/UL — SIGNIFICANT CHANGE UP (ref 150–400)
POTASSIUM SERPL-MCNC: 3.8 MMOL/L — SIGNIFICANT CHANGE UP (ref 3.5–5.3)
POTASSIUM SERPL-SCNC: 3.8 MMOL/L — SIGNIFICANT CHANGE UP (ref 3.5–5.3)
PROT SERPL-MCNC: 7.1 G/DL — SIGNIFICANT CHANGE UP (ref 6–8.3)
PROTHROM AB SERPL-ACNC: 11.4 SEC — SIGNIFICANT CHANGE UP (ref 10–12.9)
RBC # BLD: 4.32 M/UL — SIGNIFICANT CHANGE UP (ref 3.8–5.2)
RBC # FLD: 11.8 % — SIGNIFICANT CHANGE UP (ref 10.3–14.5)
SODIUM SERPL-SCNC: 141 MMOL/L — SIGNIFICANT CHANGE UP (ref 135–145)
WBC # BLD: 9.2 K/UL — SIGNIFICANT CHANGE UP (ref 3.8–10.5)
WBC # FLD AUTO: 9.2 K/UL — SIGNIFICANT CHANGE UP (ref 3.8–10.5)

## 2019-06-23 PROCEDURE — 96374 THER/PROPH/DIAG INJ IV PUSH: CPT

## 2019-06-23 PROCEDURE — 85730 THROMBOPLASTIN TIME PARTIAL: CPT

## 2019-06-23 PROCEDURE — 93005 ELECTROCARDIOGRAM TRACING: CPT

## 2019-06-23 PROCEDURE — 93010 ELECTROCARDIOGRAM REPORT: CPT

## 2019-06-23 PROCEDURE — 99284 EMERGENCY DEPT VISIT MOD MDM: CPT | Mod: 25

## 2019-06-23 PROCEDURE — 85610 PROTHROMBIN TIME: CPT

## 2019-06-23 PROCEDURE — 87086 URINE CULTURE/COLONY COUNT: CPT

## 2019-06-23 PROCEDURE — 71045 X-RAY EXAM CHEST 1 VIEW: CPT

## 2019-06-23 PROCEDURE — 96375 TX/PRO/DX INJ NEW DRUG ADDON: CPT

## 2019-06-23 PROCEDURE — 82962 GLUCOSE BLOOD TEST: CPT

## 2019-06-23 PROCEDURE — 80053 COMPREHEN METABOLIC PANEL: CPT

## 2019-06-23 PROCEDURE — 83605 ASSAY OF LACTIC ACID: CPT

## 2019-06-23 PROCEDURE — 71045 X-RAY EXAM CHEST 1 VIEW: CPT | Mod: 26

## 2019-06-23 PROCEDURE — 81003 URINALYSIS AUTO W/O SCOPE: CPT

## 2019-06-23 PROCEDURE — 99284 EMERGENCY DEPT VISIT MOD MDM: CPT

## 2019-06-23 PROCEDURE — 85027 COMPLETE CBC AUTOMATED: CPT

## 2019-06-23 PROCEDURE — 87040 BLOOD CULTURE FOR BACTERIA: CPT

## 2019-06-23 RX ORDER — ACETAMINOPHEN 500 MG
975 TABLET ORAL ONCE
Refills: 0 | Status: COMPLETED | OUTPATIENT
Start: 2019-06-23 | End: 2019-06-23

## 2019-06-23 RX ORDER — CEFTRIAXONE 500 MG/1
1000 INJECTION, POWDER, FOR SOLUTION INTRAMUSCULAR; INTRAVENOUS ONCE
Refills: 0 | Status: COMPLETED | OUTPATIENT
Start: 2019-06-23 | End: 2019-06-23

## 2019-06-23 RX ORDER — METOCLOPRAMIDE HCL 10 MG
10 TABLET ORAL ONCE
Refills: 0 | Status: COMPLETED | OUTPATIENT
Start: 2019-06-23 | End: 2019-06-23

## 2019-06-23 RX ADMIN — Medication 10 MILLIGRAM(S): at 23:09

## 2019-06-23 RX ADMIN — CEFTRIAXONE 100 MILLIGRAM(S): 500 INJECTION, POWDER, FOR SOLUTION INTRAMUSCULAR; INTRAVENOUS at 23:09

## 2019-06-23 RX ADMIN — Medication 975 MILLIGRAM(S): at 23:09

## 2019-06-23 NOTE — ED PROVIDER NOTE - OBJECTIVE STATEMENT
74yof w/ HTN has started on macrobid 3 days ago for UTI as she was having suprapubic discomfort and urinary frequency/dysuria. Today had a sudden onset of shaking chills, found to be febrile to 102. Also complains of a dull aching pressure like headache that feels like her usual migraine but was not relieved by fioricet. Denies any other infectious symptoms including cough, congestion, sore throat, chest pain, abdominal pain. No neck stiffness, photophobia, sick contacts or travel.

## 2019-06-23 NOTE — ED PROVIDER NOTE - CLINICAL SUMMARY MEDICAL DECISION MAKING FREE TEXT BOX
urinary symptoms on macrobid now with fever, no other obvious source of fever on exam. Does have migraine type headache but no AMS, neck pain or nuchal rigidity to suggest meningitis or other CNS infectious process. Appears well overall despite fever with no tachycardia, hypotension or other signs of a systemic infection. Some mild CVAT which pt reports is chronic due to back issues, but consider pyelonephritis in the context of fever and urinary symptoms. No leukocytosis or other laboratory abnormalities, lactate <4. interpretation of UA difficult due to partial treatment with antibiotics, however given lack of other source will excalate antibiotics for pyelonephritis. Given dose of parenteral ceftriaxone. Would like to admit for surveillance of cultures and continued IV antibiotics, however pt has no active symptoms right now and would like to go home. I explained to the patient and the family that the source of fever is unclear and there is a risk of a serious life threatening bacterial infection despite her well appearance. The patient is alert and oriented x4 without distracting illness or injury, displays clear thought and she and her family accept this risk and will closely observe at home. Will continue oral 3rd gen cephalosporin for two weeks. Patient presenting with urinary symptoms on macrobid now with fever, no other obvious source of fever on exam. Does have migraine type headache but no AMS, neck pain or nuchal rigidity to suggest meningitis or other CNS infectious process. Appears well overall despite fever with no tachycardia, hypotension or other signs of a systemic infection. Some mild CVAT which pt reports is chronic due to back issues, but consider pyelonephritis in the context of fever and urinary symptoms. No leukocytosis or other laboratory abnormalities, lactate <4. interpretation of UA difficult due to partial treatment with antibiotics, however given lack of other source will excalate antibiotics for pyelonephritis. Given dose of parenteral ceftriaxone. Would like to admit for surveillance of cultures and continued IV antibiotics, however pt has no active symptoms right now and would like to go home. I explained to the patient and the family that the source of fever is unclear and there is a risk of a serious life threatening bacterial infection despite her well appearance. The patient is alert and oriented x4 without distracting illness or injury, displays clear thought and she and her family accept this risk and will closely observe at home. Will continue oral 3rd gen cephalosporin for two weeks.  ATTG: Dr. Shields

## 2019-06-23 NOTE — ED PROVIDER NOTE - NSFOLLOWUPINSTRUCTIONS_ED_ALL_ED_FT
You were seen in the ER for a fever. At this time the source of fever is unclear. You will be treated with antibiotics for a urinary tract infection (pyelonephritis). Cultures of blood and urine were sent and are still pending. You will be called with any positive results. Continue cefpodoxime 200mg twice daily for 14 days. Return to the ER immediately for any new or worsening symptoms.

## 2019-06-24 VITALS
OXYGEN SATURATION: 96 % | SYSTOLIC BLOOD PRESSURE: 132 MMHG | HEART RATE: 81 BPM | DIASTOLIC BLOOD PRESSURE: 77 MMHG | RESPIRATION RATE: 20 BRPM | TEMPERATURE: 99 F

## 2019-06-24 LAB
APPEARANCE UR: CLEAR — SIGNIFICANT CHANGE UP
BILIRUB UR-MCNC: NEGATIVE — SIGNIFICANT CHANGE UP
COLOR SPEC: SIGNIFICANT CHANGE UP
CULTURE RESULTS: SIGNIFICANT CHANGE UP
DIFF PNL FLD: NEGATIVE — SIGNIFICANT CHANGE UP
GLUCOSE UR QL: ABNORMAL
KETONES UR-MCNC: NEGATIVE — SIGNIFICANT CHANGE UP
LEUKOCYTE ESTERASE UR-ACNC: NEGATIVE — SIGNIFICANT CHANGE UP
NITRITE UR-MCNC: NEGATIVE — SIGNIFICANT CHANGE UP
PH UR: 6.5 — SIGNIFICANT CHANGE UP (ref 5–8)
PROT UR-MCNC: NEGATIVE — SIGNIFICANT CHANGE UP
SP GR SPEC: 1.01 — SIGNIFICANT CHANGE UP (ref 1.01–1.02)
SPECIMEN SOURCE: SIGNIFICANT CHANGE UP
UROBILINOGEN FLD QL: NEGATIVE — SIGNIFICANT CHANGE UP

## 2019-06-24 RX ORDER — CEFPODOXIME PROXETIL 100 MG
1 TABLET ORAL
Qty: 28 | Refills: 0
Start: 2019-06-24 | End: 2019-07-07

## 2019-06-24 NOTE — ED ADULT NURSE NOTE - OBJECTIVE STATEMENT
73 yo female presents to ED c/o sudden onset shaking chills at approximately 10pm 6/23 with fever to 10. Patient reports she is on macrobid for UTI. Patient reports pressure like headache, like her typical migraines. Patient denies SOB, CP, nvd, recent sick contacts/travel, falls/loc, photophobia, neck stiffness. Patient A&Ox3, breathing spontaneously, airway patent, bl clear lungs, abdomen nontender, +pulses, cap refill <2 seconds. Patient resting in bed, side rails up, plan of care explained.

## 2019-06-29 LAB
CULTURE RESULTS: SIGNIFICANT CHANGE UP
CULTURE RESULTS: SIGNIFICANT CHANGE UP
SPECIMEN SOURCE: SIGNIFICANT CHANGE UP
SPECIMEN SOURCE: SIGNIFICANT CHANGE UP

## 2019-08-06 ENCOUNTER — APPOINTMENT (OUTPATIENT)
Dept: GASTROENTEROLOGY | Facility: CLINIC | Age: 74
End: 2019-08-06

## 2019-09-27 ENCOUNTER — APPOINTMENT (OUTPATIENT)
Dept: ORTHOPEDIC SURGERY | Facility: CLINIC | Age: 74
End: 2019-09-27
Payer: MEDICARE

## 2019-09-27 DIAGNOSIS — Z96.653 PRESENCE OF ARTIFICIAL KNEE JOINT, BILATERAL: ICD-10-CM

## 2019-09-27 PROCEDURE — 73562 X-RAY EXAM OF KNEE 3: CPT | Mod: LT

## 2019-09-27 PROCEDURE — 99213 OFFICE O/P EST LOW 20 MIN: CPT

## 2019-09-27 NOTE — REASON FOR VISIT
[Follow-Up Visit] : a follow-up visit for [Hip Pain] : hip pain [Knee Pain] : knee pain [Artificial Knee Joint] : artificial knee joint [FreeTextEntry2] : Left knee pain (S/P Left TKR 8/28/17) and Right Hip pain

## 2019-09-27 NOTE — PHYSICAL EXAM
[Normal] : Gait: normal [LE] : Sensory: Intact in bilateral lower extremities [DP] : dorsalis pedis 2+ and symmetric bilaterally [Hip Muscle Tightness 90-90 Straight Leg Raising Test Left] : negative straight leg raise [Knee Tenderness On Palpation Left] : tenderness [Knee Swelling Left] : no swelling [Knee Motion Left Crepitus] : no crepitus with ROM [Knee Motion Left] : full ROM [de-identified] : The left knee can flex to approximately 140 degrees easily and can extend to approximately 0 degrees. Flexion of the left knee did cause left-sided back pain.\par There is no patellofemoral crepitus with range of motion.\par The right hip flexes easily to 90°. The right hip can externally rotate and internally rotate with minimal discomfort I will check . Leg lengths appear perfectly equal.\par (-) Straight leg raise bilaterally [de-identified] : Radiographs of the left knee were obtained at today's visit. The x-rays revealed a well-positioned well fit total knee replacement in excellent position and alignment. No evidence of fracture dislocation osteolysis

## 2019-09-27 NOTE — HISTORY OF PRESENT ILLNESS
[de-identified] : Patient is a 70-year-old female who is being seen today for left knee pain right hip pain and complaints of numbness or tingling to her toes. Patient reports that the knee pain started approximately a week ago to 7/10 it worsens with activity especially bending her knee. She reports her knee pain is localized to the medial aspect of the knee. She reports her right hip pain worsens with activity it radiates to her right thigh it worse pain in her tendon and a 10 with activity. She reports the right hip pain begins in the right buttock area to the right hip and into the right thigh She has been using Motrin 800 mg twice a day and Tylenol as needed.\par Patient reports she has had issues with her lower back in the past. She was seen at Dr. Samuel October of 2018 she was advised to have her back evaluated. She presents today to have her knee evaluated. [Stable] : stable [Pain Location] : pain [7] : an average pain level of 7/10 [4] : a minimum pain level of 4/10 [10] : a maximum pain level of 10/10 [Standing] : standing [Lifting] : lifting [Bending] : worsened by bending [Daily] : ~He/She~ states the symptoms seem to be occuring daily [Direct Pressure] : worsened by direct pressure [Sitting] : worsened by sitting [Knee Flexion] : worsened with knee flexion [Acetaminophen] : relieved by acetaminophen [Ice] : relieved by ice [NSAIDs] : relieved by nonsteroidal anti-inflammatory drugs [Rest] : relieved by rest

## 2019-09-27 NOTE — DISCUSSION/SUMMARY
[Medication Risks Reviewed] : Medication risks reviewed [de-identified] : The patient was advised to followup with a spine specialist as her symptoms appear to be coming from her lower spine. She deferred physical therapy at this time I did offer her lower lumbar spine pamphlet which includes exercises to strengthen her quad. She will continue to use Motrin as needed for anti-inflammatories.\par The patient had her questions answered give a clear understanding of the instructions she was advised record the office at any time with any questions or concerns. The patient will followup as needed

## 2019-10-25 ENCOUNTER — APPOINTMENT (OUTPATIENT)
Dept: CT IMAGING | Facility: IMAGING CENTER | Age: 74
End: 2019-10-25
Payer: MEDICARE

## 2019-10-25 ENCOUNTER — OUTPATIENT (OUTPATIENT)
Dept: OUTPATIENT SERVICES | Facility: HOSPITAL | Age: 74
LOS: 1 days | End: 2019-10-25
Payer: MEDICARE

## 2019-10-25 DIAGNOSIS — Z00.8 ENCOUNTER FOR OTHER GENERAL EXAMINATION: ICD-10-CM

## 2019-10-25 DIAGNOSIS — Z98.890 OTHER SPECIFIED POSTPROCEDURAL STATES: Chronic | ICD-10-CM

## 2019-10-25 DIAGNOSIS — Z87.19 PERSONAL HISTORY OF OTHER DISEASES OF THE DIGESTIVE SYSTEM: Chronic | ICD-10-CM

## 2019-10-25 DIAGNOSIS — Z90.12 ACQUIRED ABSENCE OF LEFT BREAST AND NIPPLE: Chronic | ICD-10-CM

## 2019-10-25 PROCEDURE — 82565 ASSAY OF CREATININE: CPT

## 2019-10-25 PROCEDURE — 74178 CT ABD&PLV WO CNTR FLWD CNTR: CPT | Mod: 26

## 2019-10-25 PROCEDURE — 74178 CT ABD&PLV WO CNTR FLWD CNTR: CPT

## 2020-01-03 ENCOUNTER — OUTPATIENT (OUTPATIENT)
Dept: OUTPATIENT SERVICES | Facility: HOSPITAL | Age: 75
LOS: 1 days | End: 2020-01-03
Payer: MEDICARE

## 2020-01-03 VITALS
SYSTOLIC BLOOD PRESSURE: 138 MMHG | TEMPERATURE: 98 F | WEIGHT: 162.04 LBS | HEART RATE: 58 BPM | OXYGEN SATURATION: 98 % | DIASTOLIC BLOOD PRESSURE: 82 MMHG | RESPIRATION RATE: 15 BRPM | HEIGHT: 58 IN

## 2020-01-03 DIAGNOSIS — Z98.890 OTHER SPECIFIED POSTPROCEDURAL STATES: Chronic | ICD-10-CM

## 2020-01-03 DIAGNOSIS — Z87.19 PERSONAL HISTORY OF OTHER DISEASES OF THE DIGESTIVE SYSTEM: Chronic | ICD-10-CM

## 2020-01-03 DIAGNOSIS — N20.0 CALCULUS OF KIDNEY: ICD-10-CM

## 2020-01-03 DIAGNOSIS — Z90.49 ACQUIRED ABSENCE OF OTHER SPECIFIED PARTS OF DIGESTIVE TRACT: Chronic | ICD-10-CM

## 2020-01-03 DIAGNOSIS — Z01.818 ENCOUNTER FOR OTHER PREPROCEDURAL EXAMINATION: ICD-10-CM

## 2020-01-03 DIAGNOSIS — J45.909 UNSPECIFIED ASTHMA, UNCOMPLICATED: ICD-10-CM

## 2020-01-03 DIAGNOSIS — G43.009 MIGRAINE WITHOUT AURA, NOT INTRACTABLE, WITHOUT STATUS MIGRAINOSUS: ICD-10-CM

## 2020-01-03 DIAGNOSIS — Z90.89 ACQUIRED ABSENCE OF OTHER ORGANS: Chronic | ICD-10-CM

## 2020-01-03 DIAGNOSIS — Z90.12 ACQUIRED ABSENCE OF LEFT BREAST AND NIPPLE: Chronic | ICD-10-CM

## 2020-01-03 DIAGNOSIS — N21.0 CALCULUS IN BLADDER: ICD-10-CM

## 2020-01-03 DIAGNOSIS — Z96.653 PRESENCE OF ARTIFICIAL KNEE JOINT, BILATERAL: Chronic | ICD-10-CM

## 2020-01-03 LAB
ANION GAP SERPL CALC-SCNC: 14 MMOL/L — SIGNIFICANT CHANGE UP (ref 5–17)
BUN SERPL-MCNC: 18 MG/DL — SIGNIFICANT CHANGE UP (ref 7–23)
CALCIUM SERPL-MCNC: 10.5 MG/DL — SIGNIFICANT CHANGE UP (ref 8.4–10.5)
CHLORIDE SERPL-SCNC: 102 MMOL/L — SIGNIFICANT CHANGE UP (ref 96–108)
CO2 SERPL-SCNC: 28 MMOL/L — SIGNIFICANT CHANGE UP (ref 22–31)
CREAT SERPL-MCNC: 0.72 MG/DL — SIGNIFICANT CHANGE UP (ref 0.5–1.3)
GLUCOSE SERPL-MCNC: 89 MG/DL — SIGNIFICANT CHANGE UP (ref 70–99)
HCT VFR BLD CALC: 40.6 % — SIGNIFICANT CHANGE UP (ref 34.5–45)
HGB BLD-MCNC: 13.3 G/DL — SIGNIFICANT CHANGE UP (ref 11.5–15.5)
MCHC RBC-ENTMCNC: 30.4 PG — SIGNIFICANT CHANGE UP (ref 27–34)
MCHC RBC-ENTMCNC: 32.8 GM/DL — SIGNIFICANT CHANGE UP (ref 32–36)
MCV RBC AUTO: 92.9 FL — SIGNIFICANT CHANGE UP (ref 80–100)
PLATELET # BLD AUTO: 204 K/UL — SIGNIFICANT CHANGE UP (ref 150–400)
POTASSIUM SERPL-MCNC: 4.1 MMOL/L — SIGNIFICANT CHANGE UP (ref 3.5–5.3)
POTASSIUM SERPL-SCNC: 4.1 MMOL/L — SIGNIFICANT CHANGE UP (ref 3.5–5.3)
RBC # BLD: 4.37 M/UL — SIGNIFICANT CHANGE UP (ref 3.8–5.2)
RBC # FLD: 13.3 % — SIGNIFICANT CHANGE UP (ref 10.3–14.5)
SODIUM SERPL-SCNC: 144 MMOL/L — SIGNIFICANT CHANGE UP (ref 135–145)
WBC # BLD: 5.28 K/UL — SIGNIFICANT CHANGE UP (ref 3.8–10.5)
WBC # FLD AUTO: 5.28 K/UL — SIGNIFICANT CHANGE UP (ref 3.8–10.5)

## 2020-01-03 RX ORDER — AZELASTINE 137 UG/1
2 SPRAY, METERED NASAL
Qty: 0 | Refills: 0 | DISCHARGE

## 2020-01-03 RX ORDER — LORATADINE 10 MG/1
1 TABLET ORAL
Qty: 0 | Refills: 0 | DISCHARGE

## 2020-01-03 RX ORDER — MOMETASONE FUROATE 1 MG/G
1 CREAM TOPICAL
Qty: 0 | Refills: 0 | DISCHARGE

## 2020-01-03 RX ORDER — DOCUSATE SODIUM 100 MG
1 CAPSULE ORAL
Qty: 0 | Refills: 0 | DISCHARGE

## 2020-01-03 RX ORDER — MULTIVIT-MIN/FERROUS GLUCONATE 9 MG/15 ML
1 LIQUID (ML) ORAL
Qty: 0 | Refills: 0 | DISCHARGE

## 2020-01-03 RX ORDER — ATORVASTATIN CALCIUM 80 MG/1
1 TABLET, FILM COATED ORAL
Qty: 0 | Refills: 0 | DISCHARGE

## 2020-01-03 RX ORDER — BUTALBITAL
1 POWDER (GRAM) MISCELLANEOUS
Qty: 0 | Refills: 0 | DISCHARGE

## 2020-01-03 RX ORDER — FLUTICASONE PROPIONATE 220 MCG
2 AEROSOL WITH ADAPTER (GRAM) INHALATION
Qty: 0 | Refills: 0 | DISCHARGE

## 2020-01-03 RX ORDER — FLUTICASONE PROPIONATE AND SALMETEROL 50; 250 UG/1; UG/1
1 POWDER ORAL; RESPIRATORY (INHALATION)
Qty: 0 | Refills: 0 | DISCHARGE

## 2020-01-03 NOTE — H&P PST ADULT - NSICDXPASTMEDICALHX_GEN_ALL_CORE_FT
PAST MEDICAL HISTORY:  Essential hypertension     Gastroesophageal reflux disease, esophagitis presence not specified     Hyperlipidemia, unspecified hyperlipidemia type     Malignant neoplasm of left female breast, unspecified estrogen receptor status, unspecified site of breast 2008- treated with lumpectomy, chemotherapy and radiation therapy.    Nephrolithiasis     Nonintractable migraine, unspecified migraine type     OA (osteoarthritis)     Uncomplicated asthma, unspecified asthma severity mild-controlled w/ medication- has never been in ER or hospitalized for asthma. never had a severe exacerbation.

## 2020-01-03 NOTE — H&P PST ADULT - NSICDXPASTSURGICALHX_GEN_ALL_CORE_FT
PAST SURGICAL HISTORY:  H/O hiatal hernia surgery approx 2002    S/P appendectomy     S/P carpal tunnel release right    S/P lumpectomy, left breast approx 2008    S/P ovarian cystectomy     S/P tonsillectomy     S/p total knee replacement, bilateral 2017

## 2020-01-03 NOTE — H&P PST ADULT - HISTORY OF PRESENT ILLNESS
75 y/o female with pmhx of HTN, HLD, left breast CA s/p lumpectomy with chemo/RT intractable migraines, nephrolithiasis w/ right renal colic and gross hematuria which began approx. in September 2019. Diagnostic tests found bilateral renal stones. Denies fever, chills, but continues to experience right flank pain. Presents right ESWL. 73 y/o female with pmhx of HTN, HLD, left breast CA s/p lumpectomy with chemo/RT, intractable migraines on Depakote and topamax, nephrolithiasis w/ right renal colic and gross hematuria which began approx. in September 2019. Diagnostic tests found bilateral renal stones. Denies fever, chills, but continues to experience right flank pain. Presents right ESWL.

## 2020-01-03 NOTE — H&P PST ADULT - NSICDXPROBLEM_GEN_ALL_CORE_FT
PROBLEM DIAGNOSES  Problem: Nephrolithiasis  Assessment and Plan: Scheduled right ESWL  stopped aspirin 12/25/19  UCx collected    Problem: Nonintractable migraine, unspecified migraine type  Assessment and Plan: c/w prophylactic migraine medications as prescribed    Problem: Uncomplicated asthma, unspecified asthma severity  Assessment and Plan: Instructed to use Advair and rescue inhaler the DOS

## 2020-01-03 NOTE — H&P PST ADULT - NSICDXFAMILYHX_GEN_ALL_CORE_FT
FAMILY HISTORY:  Father  Still living? No  Family history of myocardial infarction at age less than 60, Age at diagnosis: Age Unknown    Sibling  Still living? No  Family history of acute myocardial infarction, Age at diagnosis: Age Unknown

## 2020-01-04 LAB
CULTURE RESULTS: SIGNIFICANT CHANGE UP
SPECIMEN SOURCE: SIGNIFICANT CHANGE UP

## 2020-01-15 ENCOUNTER — TRANSCRIPTION ENCOUNTER (OUTPATIENT)
Age: 75
End: 2020-01-15

## 2020-01-16 ENCOUNTER — OUTPATIENT (OUTPATIENT)
Dept: OUTPATIENT SERVICES | Facility: HOSPITAL | Age: 75
LOS: 1 days | End: 2020-01-16
Payer: MEDICARE

## 2020-01-16 VITALS — OXYGEN SATURATION: 96 % | HEART RATE: 60 BPM | RESPIRATION RATE: 16 BRPM

## 2020-01-16 VITALS
HEIGHT: 58 IN | DIASTOLIC BLOOD PRESSURE: 85 MMHG | OXYGEN SATURATION: 100 % | RESPIRATION RATE: 18 BRPM | WEIGHT: 162.04 LBS | TEMPERATURE: 98 F | SYSTOLIC BLOOD PRESSURE: 147 MMHG | HEART RATE: 52 BPM

## 2020-01-16 DIAGNOSIS — Z90.89 ACQUIRED ABSENCE OF OTHER ORGANS: Chronic | ICD-10-CM

## 2020-01-16 DIAGNOSIS — Z01.818 ENCOUNTER FOR OTHER PREPROCEDURAL EXAMINATION: ICD-10-CM

## 2020-01-16 DIAGNOSIS — Z98.890 OTHER SPECIFIED POSTPROCEDURAL STATES: Chronic | ICD-10-CM

## 2020-01-16 DIAGNOSIS — Z87.19 PERSONAL HISTORY OF OTHER DISEASES OF THE DIGESTIVE SYSTEM: Chronic | ICD-10-CM

## 2020-01-16 DIAGNOSIS — Z90.12 ACQUIRED ABSENCE OF LEFT BREAST AND NIPPLE: Chronic | ICD-10-CM

## 2020-01-16 DIAGNOSIS — N21.0 CALCULUS IN BLADDER: ICD-10-CM

## 2020-01-16 DIAGNOSIS — Z90.49 ACQUIRED ABSENCE OF OTHER SPECIFIED PARTS OF DIGESTIVE TRACT: Chronic | ICD-10-CM

## 2020-01-16 DIAGNOSIS — Z96.653 PRESENCE OF ARTIFICIAL KNEE JOINT, BILATERAL: Chronic | ICD-10-CM

## 2020-01-16 PROCEDURE — G0463: CPT

## 2020-01-16 PROCEDURE — 87086 URINE CULTURE/COLONY COUNT: CPT

## 2020-01-16 PROCEDURE — 74018 RADEX ABDOMEN 1 VIEW: CPT

## 2020-01-16 PROCEDURE — 85027 COMPLETE CBC AUTOMATED: CPT

## 2020-01-16 PROCEDURE — 74018 RADEX ABDOMEN 1 VIEW: CPT | Mod: 26

## 2020-01-16 PROCEDURE — 76000 FLUOROSCOPY <1 HR PHYS/QHP: CPT

## 2020-01-16 PROCEDURE — 80048 BASIC METABOLIC PNL TOTAL CA: CPT

## 2020-01-16 PROCEDURE — 50590 FRAGMENTING OF KIDNEY STONE: CPT | Mod: RT

## 2020-01-16 RX ORDER — LIDOCAINE HCL 20 MG/ML
0.2 VIAL (ML) INJECTION ONCE
Refills: 0 | Status: DISCONTINUED | OUTPATIENT
Start: 2020-01-16 | End: 2020-01-16

## 2020-01-16 RX ORDER — SODIUM CHLORIDE 9 MG/ML
3 INJECTION INTRAMUSCULAR; INTRAVENOUS; SUBCUTANEOUS EVERY 8 HOURS
Refills: 0 | Status: DISCONTINUED | OUTPATIENT
Start: 2020-01-16 | End: 2020-01-16

## 2020-01-16 NOTE — ASU DISCHARGE PLAN (ADULT/PEDIATRIC) - CARE PROVIDER_API CALL
Alan Cruz)  Urology  [address] [place] [address]  Phone: [phone]  Fax: [phone]  Follow Up Time: 1 month

## 2020-01-16 NOTE — ASU DISCHARGE PLAN (ADULT/PEDIATRIC) - CALL YOUR DOCTOR IF YOU HAVE ANY OF THE FOLLOWING:
Fever greater than (need to indicate Fahrenheit or Celsius)/Inability to tolerate liquids or foods/Nausea and vomiting that does not stop/Unable to urinate

## 2020-01-16 NOTE — ASU DISCHARGE PLAN (ADULT/PEDIATRIC) - C. MAKE IMPORTANT PERSONAL OR BUSINESS DECISIONS
Patient called back requesting letter be mailed instead of picked up. States she is unable to  today.    Please mail to:  [id number] [address] [id number]   Statement Selected

## 2020-02-07 PROBLEM — M19.90 UNSPECIFIED OSTEOARTHRITIS, UNSPECIFIED SITE: Chronic | Status: ACTIVE | Noted: 2020-01-03

## 2020-02-15 ENCOUNTER — OUTPATIENT (OUTPATIENT)
Dept: OUTPATIENT SERVICES | Facility: HOSPITAL | Age: 75
LOS: 1 days | End: 2020-02-15
Payer: MEDICARE

## 2020-02-15 ENCOUNTER — APPOINTMENT (OUTPATIENT)
Dept: ULTRASOUND IMAGING | Facility: IMAGING CENTER | Age: 75
End: 2020-02-15
Payer: MEDICARE

## 2020-02-15 DIAGNOSIS — Z87.19 PERSONAL HISTORY OF OTHER DISEASES OF THE DIGESTIVE SYSTEM: Chronic | ICD-10-CM

## 2020-02-15 DIAGNOSIS — Z98.890 OTHER SPECIFIED POSTPROCEDURAL STATES: Chronic | ICD-10-CM

## 2020-02-15 DIAGNOSIS — Z90.49 ACQUIRED ABSENCE OF OTHER SPECIFIED PARTS OF DIGESTIVE TRACT: Chronic | ICD-10-CM

## 2020-02-15 DIAGNOSIS — Z96.653 PRESENCE OF ARTIFICIAL KNEE JOINT, BILATERAL: Chronic | ICD-10-CM

## 2020-02-15 DIAGNOSIS — Z90.89 ACQUIRED ABSENCE OF OTHER ORGANS: Chronic | ICD-10-CM

## 2020-02-15 DIAGNOSIS — Z90.12 ACQUIRED ABSENCE OF LEFT BREAST AND NIPPLE: Chronic | ICD-10-CM

## 2020-02-15 DIAGNOSIS — Z00.8 ENCOUNTER FOR OTHER GENERAL EXAMINATION: ICD-10-CM

## 2020-02-15 PROCEDURE — 76770 US EXAM ABDO BACK WALL COMP: CPT | Mod: 26

## 2020-02-15 PROCEDURE — 76770 US EXAM ABDO BACK WALL COMP: CPT

## 2020-08-21 ENCOUNTER — APPOINTMENT (OUTPATIENT)
Dept: NEUROSURGERY | Facility: CLINIC | Age: 75
End: 2020-08-21
Payer: MEDICARE

## 2020-08-21 VITALS
RESPIRATION RATE: 15 BRPM | DIASTOLIC BLOOD PRESSURE: 79 MMHG | HEART RATE: 68 BPM | WEIGHT: 150 LBS | SYSTOLIC BLOOD PRESSURE: 137 MMHG | OXYGEN SATURATION: 96 % | BODY MASS INDEX: 30.24 KG/M2 | HEIGHT: 59 IN

## 2020-08-21 VITALS — TEMPERATURE: 97.1 F

## 2020-08-21 DIAGNOSIS — R51 HEADACHE: ICD-10-CM

## 2020-08-21 PROCEDURE — 99203 OFFICE O/P NEW LOW 30 MIN: CPT

## 2020-08-28 NOTE — HISTORY OF PRESENT ILLNESS
[de-identified] : The pt reports that she began to have tremors in her hands about 4 months ago. She has not seen anyone about this as her PCP recently retired. She also has pain in left side of her head. She had MRI over a year ago,  unsure what the result was but was told then she should see a neurosurgeon. No MRI or report is available for this visit.

## 2020-08-28 NOTE — ASSESSMENT
[FreeTextEntry1] : 75 year old female with hand tremor and left sided headache. No MRI brought for review with this visit but she started she had one done over a year ago and was told there was an abnormality.\par \par Plan:\par - MRI brain\par - Referral to see Dr. Lakhani

## 2020-08-28 NOTE — PHYSICAL EXAM
[General Appearance - Alert] : alert [General Appearance - In No Acute Distress] : in no acute distress [General Appearance - Well Nourished] : well nourished [General Appearance - Well Developed] : well developed [Oriented To Time, Place, And Person] : oriented to person, place, and time [Affect] : the affect was normal [Impaired Insight] : insight and judgment were intact [Cranial Nerves Optic (II)] : visual acuity intact bilaterally,  pupils equal round and reactive to light [Cranial Nerves Oculomotor (III)] : extraocular motion intact [Cranial Nerves Trigeminal (V)] : facial sensation intact symmetrically [Cranial Nerves Vestibulocochlear (VIII)] : hearing was intact bilaterally [Cranial Nerves Accessory (XI - Cranial And Spinal)] : head turning and shoulder shrug symmetric [Motor Tone] : muscle tone was normal in all four extremities [Motor Strength] : muscle strength was normal in all four extremities [No Muscle Atrophy] : normal bulk in all four extremities [Sensation Tactile Decrease] : light touch was intact [Balance] : balance was intact [Tremor] : a tremor present [2+] : Patella left 2+ [No Visual Abnormalities] : no visible abnormalities [Intact] : all reflexes within normal limits bilaterally [Sclera] : the sclera and conjunctiva were normal [Full Visual Field] : full visual field [PERRL With Normal Accommodation] : pupils were equal in size, round, reactive to light, with normal accommodation [Outer Ear] : the ears and nose were normal in appearance [Both Tympanic Membranes Were Examined] : both tympanic membranes were normal [Neck Cervical Mass (___cm)] : no neck mass was observed [Neck Appearance] : the appearance of the neck was normal [] : no respiratory distress [No Spinal Tenderness] : no spinal tenderness [Abnormal Walk] : normal gait [Musculoskeletal - Swelling] : no joint swelling seen [Person] : oriented to person [Short Term Intact] : short term memory intact [Span Intact] : the attention span was normal [Fluency] : fluency intact [Cranial Nerves Glossopharyngeal (IX)] : tongue and palate midline [Cranial Nerves Facial (VII)] : face symmetrical [Current Events] : adequate knowledge of current events [Cranial Nerves Hypoglossal (XII)] : there was no tongue deviation with protrusion [Romberg's Sign] : Romberg's sign was negtive [Allodynia] : no ~T allodynia present [Dysesthesia] : no dysesthesia [Limited Balance] : balance was intact [Hyperesthesia] : no hyperesthesia [Past-pointing] : there was no past-pointing [Coordination - Dysmetria Impaired Finger-to-Nose Bilateral] : not present [Dysdiadochokinesia Bilaterally] : not present [Coordination - Dysmetria Impaired Heel-to-Shin Bilateral] : not present [FreeTextEntry1] : fine

## 2020-08-28 NOTE — PHYSICAL EXAM
[General Appearance - Alert] : alert [General Appearance - In No Acute Distress] : in no acute distress [General Appearance - Well Nourished] : well nourished [General Appearance - Well Developed] : well developed [Oriented To Time, Place, And Person] : oriented to person, place, and time [Impaired Insight] : insight and judgment were intact [Affect] : the affect was normal [Cranial Nerves Optic (II)] : visual acuity intact bilaterally,  pupils equal round and reactive to light [Cranial Nerves Oculomotor (III)] : extraocular motion intact [Cranial Nerves Trigeminal (V)] : facial sensation intact symmetrically [Cranial Nerves Vestibulocochlear (VIII)] : hearing was intact bilaterally [Cranial Nerves Accessory (XI - Cranial And Spinal)] : head turning and shoulder shrug symmetric [Motor Tone] : muscle tone was normal in all four extremities [Motor Strength] : muscle strength was normal in all four extremities [No Muscle Atrophy] : normal bulk in all four extremities [Sensation Tactile Decrease] : light touch was intact [Balance] : balance was intact [Tremor] : a tremor present [2+] : Patella left 2+ [No Visual Abnormalities] : no visible abnormalities [Intact] : all reflexes within normal limits bilaterally [Sclera] : the sclera and conjunctiva were normal [PERRL With Normal Accommodation] : pupils were equal in size, round, reactive to light, with normal accommodation [Full Visual Field] : full visual field [Outer Ear] : the ears and nose were normal in appearance [Both Tympanic Membranes Were Examined] : both tympanic membranes were normal [Neck Cervical Mass (___cm)] : no neck mass was observed [Neck Appearance] : the appearance of the neck was normal [] : no respiratory distress [No Spinal Tenderness] : no spinal tenderness [Abnormal Walk] : normal gait [Musculoskeletal - Swelling] : no joint swelling seen [Person] : oriented to person [Short Term Intact] : short term memory intact [Span Intact] : the attention span was normal [Fluency] : fluency intact [Cranial Nerves Facial (VII)] : face symmetrical [Cranial Nerves Glossopharyngeal (IX)] : tongue and palate midline [Current Events] : adequate knowledge of current events [Cranial Nerves Hypoglossal (XII)] : there was no tongue deviation with protrusion [Romberg's Sign] : Romberg's sign was negtive [Allodynia] : no ~T allodynia present [Dysesthesia] : no dysesthesia [Limited Balance] : balance was intact [Past-pointing] : there was no past-pointing [Hyperesthesia] : no hyperesthesia [Coordination - Dysmetria Impaired Heel-to-Shin Bilateral] : not present [Coordination - Dysmetria Impaired Finger-to-Nose Bilateral] : not present [Dysdiadochokinesia Bilaterally] : not present [FreeTextEntry1] : fine

## 2020-08-28 NOTE — HISTORY OF PRESENT ILLNESS
[de-identified] : The pt reports that she began to have tremors in her hands about 4 months ago. She has not seen anyone about this as her PCP recently retired. She also has pain in left side of her head. She had MRI over a year ago,  unsure what the result was but was told then she should see a neurosurgeon. No MRI or report is available for this visit.

## 2020-09-11 ENCOUNTER — APPOINTMENT (OUTPATIENT)
Dept: MRI IMAGING | Facility: CLINIC | Age: 75
End: 2020-09-11

## 2020-09-24 NOTE — PHYSICAL THERAPY INITIAL EVALUATION ADULT - GAIT TRAINING, PT EVAL
Impression: Glaucoma Suspect - Low Risk Bilateral: H40.013. Bilateral. 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 Continue to monitor off drops at this time, discussed diagnosis with patient, patient understands. Recommend baseline glaucoma screening with Dr. Isha Strong RTC 3mo IOP Check w/ OCT ON, [place] Goals 5-7 days, Pt will ambulate 100 ft w/ rolling walker w/ supervison

## 2020-10-07 ENCOUNTER — OUTPATIENT (OUTPATIENT)
Dept: OUTPATIENT SERVICES | Facility: HOSPITAL | Age: 75
LOS: 1 days | End: 2020-10-07
Payer: MEDICARE

## 2020-10-07 ENCOUNTER — APPOINTMENT (OUTPATIENT)
Dept: MRI IMAGING | Facility: CLINIC | Age: 75
End: 2020-10-07
Payer: MEDICARE

## 2020-10-07 DIAGNOSIS — Z90.89 ACQUIRED ABSENCE OF OTHER ORGANS: Chronic | ICD-10-CM

## 2020-10-07 DIAGNOSIS — Z90.12 ACQUIRED ABSENCE OF LEFT BREAST AND NIPPLE: Chronic | ICD-10-CM

## 2020-10-07 DIAGNOSIS — Z00.8 ENCOUNTER FOR OTHER GENERAL EXAMINATION: ICD-10-CM

## 2020-10-07 DIAGNOSIS — Z90.49 ACQUIRED ABSENCE OF OTHER SPECIFIED PARTS OF DIGESTIVE TRACT: Chronic | ICD-10-CM

## 2020-10-07 DIAGNOSIS — Z98.890 OTHER SPECIFIED POSTPROCEDURAL STATES: Chronic | ICD-10-CM

## 2020-10-07 DIAGNOSIS — Z87.19 PERSONAL HISTORY OF OTHER DISEASES OF THE DIGESTIVE SYSTEM: Chronic | ICD-10-CM

## 2020-10-07 DIAGNOSIS — Z96.653 PRESENCE OF ARTIFICIAL KNEE JOINT, BILATERAL: Chronic | ICD-10-CM

## 2020-10-07 PROCEDURE — 70551 MRI BRAIN STEM W/O DYE: CPT | Mod: 26

## 2020-10-07 PROCEDURE — 70551 MRI BRAIN STEM W/O DYE: CPT

## 2020-10-14 ENCOUNTER — APPOINTMENT (OUTPATIENT)
Dept: NEUROLOGY | Facility: CLINIC | Age: 75
End: 2020-10-14
Payer: MEDICARE

## 2020-10-14 VITALS
HEART RATE: 58 BPM | BODY MASS INDEX: 30.84 KG/M2 | SYSTOLIC BLOOD PRESSURE: 117 MMHG | HEIGHT: 59 IN | WEIGHT: 153 LBS | DIASTOLIC BLOOD PRESSURE: 74 MMHG

## 2020-10-14 VITALS — TEMPERATURE: 97.4 F

## 2020-10-14 DIAGNOSIS — R25.1 TREMOR, UNSPECIFIED: ICD-10-CM

## 2020-10-14 DIAGNOSIS — G43.909 MIGRAINE, UNSPECIFIED, NOT INTRACTABLE, W/OUT STATUS MIGRAINOSUS: ICD-10-CM

## 2020-10-14 DIAGNOSIS — Z78.9 OTHER SPECIFIED HEALTH STATUS: ICD-10-CM

## 2020-10-14 DIAGNOSIS — G89.29 OTHER CHRONIC PAIN: ICD-10-CM

## 2020-10-14 PROCEDURE — 99215 OFFICE O/P EST HI 40 MIN: CPT

## 2020-10-14 NOTE — DISCUSSION/SUMMARY
[FreeTextEntry1] : 1. Chronic migraines : cannot try zonisamide due to hx of renal stones and will start coQ 10 100mg daily and riboflavin 100mg BID \par \par 2. Tremor: likely benign essential tremor and continue to monitor off meds \par \par 3. Continue naprosen for arthritic pains and will refer to ortho for possible injection of right shoulder \par \par 4. Follow up in 4 months and all questions answered

## 2020-10-14 NOTE — PHYSICAL EXAM
[General Appearance - Alert] : alert [Oriented To Time, Place, And Person] : oriented to person, place, and time [Person] : oriented to person [Place] : oriented to place [Time] : oriented to time [Cranial Nerves Oculomotor (III)] : extraocular motion intact [Cranial Nerves Optic (II)] : visual acuity intact bilaterally,  visual fields full to confrontation, pupils equal round and reactive to light [Cranial Nerves Facial (VII)] : face symmetrical [Cranial Nerves Vestibulocochlear (VIII)] : hearing was intact bilaterally [Cranial Nerves Trigeminal (V)] : facial sensation intact symmetrically [Cranial Nerves Glossopharyngeal (IX)] : tongue and palate midline [Motor Tone] : muscle tone was normal in all four extremities [Cranial Nerves Accessory (XI - Cranial And Spinal)] : head turning and shoulder shrug symmetric [No Muscle Atrophy] : normal bulk in all four extremities [Involuntary Movements] : no involuntary movements were seen [Motor Handedness Right-Handed] : the patient is right hand dominant [Paresis Pronator Drift Left-Sided] : no pronator drift on the left [Paresis Pronator Drift Right-Sided] : no pronator drift on the right [Limited Balance] : the patient's balance was impaired [Romberg's Sign] : Romberg's sign was negtive [Tremor] : a tremor present [Past-pointing] : there was no past-pointing [Coordination - Dysmetria Impaired Finger-to-Nose Bilateral] : not present [1+] : Patella left 1+ [0] : Ankle jerk right 0 [Plantar Reflex Right Only] : normal on the right [Plantar Reflex Left Only] : normal on the left [Extraocular Movements] : extraocular movements were intact [FreeTextEntry1] : no resting tremor, no rigidity and mild bradykinesia and slow gait  [Neck Appearance] : the appearance of the neck was normal [Skin Lesions] : no skin lesions [] : no rash

## 2020-10-14 NOTE — REVIEW OF SYSTEMS
[Fever] : no fever [Feeling Tired] : not feeling tired [Chills] : no chills [As Noted in HPI] : as noted in HPI [Numbness] : numbness [Tingling] : tingling [Abnormal Sensation] : no abnormal sensation [Migraine Headache] : migraine headaches [Dizziness] : no dizziness [Joint Pain] : joint pain [Sleep Disturbances] : sleep disturbances [Joint Stiffness] : joint stiffness [de-identified] : at times due to pain  [Negative] : Heme/Lymph [FreeTextEntry9] : shoulders and knees

## 2020-10-14 NOTE — DATA REVIEWED
[de-identified] : Reviewed and no acute blood or hemorrhage or structural changes \par \par Benign hyperostosis frontalis

## 2020-10-14 NOTE — HISTORY OF PRESENT ILLNESS
[FreeTextEntry1] : Patient was referred by Dr. Colunga for headaches and tremor. She has been having headaches for the past 5 years and they have been getting more frequent headaches about 2-3 times per week. They are located in the occipital region, vertex, and it is throbbing and pressure like and associated nausea but no vomiting and there is both phonophobia and photophobia.\par She usually has to go lay down in a dark room and she uses butalbital 2 tabs and it takes the edge off. \par She denies any headache now but her headaches are usually during the day and last several hours. \par She cannot identify any particular triggers. \par She tried topamax in the past and no relief and she also tried and remains on depakote 500mg daily with little relief. \par \par In addition, she reports a tremor of her bilateral arms over the past 6 months and it has been getting worse. Anxiety certainly exacerbates her tremor and it is worse with intention and holding something. Does not occur at rest. Her writing is also affected by the tremor. \par \par

## 2020-10-19 ENCOUNTER — RESULT REVIEW (OUTPATIENT)
Age: 75
End: 2020-10-19

## 2020-11-17 ENCOUNTER — APPOINTMENT (OUTPATIENT)
Dept: ORTHOPEDIC SURGERY | Facility: CLINIC | Age: 75
End: 2020-11-17
Payer: MEDICARE

## 2020-11-17 VITALS
HEART RATE: 59 BPM | HEIGHT: 59 IN | SYSTOLIC BLOOD PRESSURE: 131 MMHG | DIASTOLIC BLOOD PRESSURE: 83 MMHG | TEMPERATURE: 96.7 F

## 2020-11-17 DIAGNOSIS — M16.11 UNILATERAL PRIMARY OSTEOARTHRITIS, RIGHT HIP: ICD-10-CM

## 2020-11-17 DIAGNOSIS — M54.5 LOW BACK PAIN: ICD-10-CM

## 2020-11-17 PROCEDURE — 73521 X-RAY EXAM HIPS BI 2 VIEWS: CPT

## 2020-11-17 PROCEDURE — 99213 OFFICE O/P EST LOW 20 MIN: CPT

## 2020-11-23 ENCOUNTER — APPOINTMENT (OUTPATIENT)
Dept: ORTHOPEDIC SURGERY | Facility: CLINIC | Age: 75
End: 2020-11-23
Payer: MEDICARE

## 2020-11-23 VITALS
DIASTOLIC BLOOD PRESSURE: 75 MMHG | SYSTOLIC BLOOD PRESSURE: 147 MMHG | HEART RATE: 60 BPM | HEIGHT: 59 IN | BODY MASS INDEX: 30.24 KG/M2 | TEMPERATURE: 97.2 F | WEIGHT: 150 LBS

## 2020-11-23 DIAGNOSIS — M43.10 SPONDYLOLISTHESIS, SITE UNSPECIFIED: ICD-10-CM

## 2020-11-23 DIAGNOSIS — M54.16 RADICULOPATHY, LUMBAR REGION: ICD-10-CM

## 2020-11-23 DIAGNOSIS — M51.37 OTHER INTERVERTEBRAL DISC DEGENERATION, LUMBOSACRAL REGION: ICD-10-CM

## 2020-11-23 DIAGNOSIS — M48.061 SPINAL STENOSIS, LUMBAR REGION WITHOUT NEUROGENIC CLAUDICATION: ICD-10-CM

## 2020-11-23 PROCEDURE — 99214 OFFICE O/P EST MOD 30 MIN: CPT

## 2020-11-23 PROCEDURE — 72110 X-RAY EXAM L-2 SPINE 4/>VWS: CPT

## 2020-11-23 PROCEDURE — 72170 X-RAY EXAM OF PELVIS: CPT | Mod: 59

## 2020-11-23 NOTE — HISTORY OF PRESENT ILLNESS
[Worsening] : worsening [7] : a current pain level of 7/10 [Daily] : ~He/She~ states the symptoms seem to be occuring daily [Bending] : worsened by bending [___ yrs] : [unfilled] year(s) ago [de-identified] : Patient is here today for evaluation on her low back into bilateral hip pain going on for awhile. Patient states in 1987 had a fall and went for physical therapy at that time. Patient not medically treated for this episode. Past 5 yrs worsening pain.\par Not currently working, works at home for exercise. \par Takes naprosyn without relief. [Heat] : not relieved by heat [Ice] : not relieved by ice [Recumbency] : not relieved by recumbency [Rest] : not relieved with rest [de-identified] : naprosyn

## 2020-11-23 NOTE — PHYSICAL EXAM
[Stooped] : stooped [LE] : Sensory: Intact in bilateral lower extremities [0] : left ankle jerk 0 [DP] : dorsalis pedis 2+ and symmetric bilaterally [SLR] : negative straight leg raise [Plantar Reflex Right Only] : absent on the right [Plantar Reflex Left Only] : absent on the left [DTR Reflexes Clonus Of Right Ankle (___ Beats)] : absent on the right [DTR Reflexes Clonus Of Left Ankle (___ Beats)] : absent on the left [de-identified] : The pt is awake, alert and oriented to self, place and time, is comfortable and in no acute distress. Inspection of neck, back and lower extremities bilaterally reveals no rashes or ecchymotic lesions.  There is no obvious abnormal spinal curvature in the sagittal and coronal planes. There is no tenderness over the cervical, thoracic or lumbar spine, or the paraspinal or upper and lower extremities musculature. There is no sacroiliac tenderness. No greater trochanteric tenderness bilaterally. No atrophy or abnormal movements noted in the upper or lower extremities. There is no swelling noted in the upper or lower extremities bilaterally. No cervical lymphadenopathy noted anteriorly. No joint laxity noted in the upper and lower extremity joints bilaterally.\par Hip range of motion is degrees internal rotation 30° external rotation without pain. Full range of motion of the shoulders bilaterally with no significant pain\par There is no groin pain with hip internal rotation and a negative MARCE test bilaterally.  [de-identified] : flex to her knees with back pain, ext 25 degrees with pain\par healed knee incisions bilaterally [de-identified] : 4 views lumbar spine demonstrate no significant scoliosis.  Hyperlordosis lumbar spine with grade 1 spondylolisthesis at L4-5.  No dynamic instability between flexion extension.  Facet degeneration identified at that level.  No obvious acute fractures.  Thoracolumbar degenerative changes seen progressing down to L1 to with retrolisthesis noted at increased thoracolumbar junctional kyphosis.\par \par AP pelvis demonstrates normal appearance of the hips bilaterally.  No acute fractures.  Mild degenerative changes of the hips bilaterally.

## 2020-11-23 NOTE — DISCUSSION/SUMMARY
[Medication Risks Reviewed] : Medication risks reviewed [de-identified] : At this time the patient presents with symptoms consistent with low back pain in the setting of degenerative spondylolisthesis at L4-5 with likely associated spinal stenosis.  She is not interested in formal physical therapy at this time given the current pandemic.  She has reported some neck pain symptoms as well.  Cervical spine lumbar spine self-directed exercise brochure was provided today.  Recommended also physical therapy at this time but she will consider that later in the spring.  Prescribed her diclofenac and methocarbamol for symptomatic relief.  I will see her back in 4 to 6 weeks for reevaluation.  If her symptoms persist or worsen lumbar spine MRI followed by lumbar epidural steroid injections may be a consideration.\par \par The patient was educated regarding their condition, treatment options as well as prescribed course of treatment. \par Risks and benefits as well as alternatives to the proposed treatment were also provided to the patient \par They were given the opportunity to have all their questions answered to their satisfaction.\par \par Vital signs were reviewed with the patient and the patient was instructed to followup with their primary care provider for further management.\par \par Healthy lifestyle recommendations were also made including a tobacco free lifestyle, proper diet, and weight control.

## 2020-12-01 ENCOUNTER — APPOINTMENT (OUTPATIENT)
Dept: ORTHOPEDIC SURGERY | Facility: CLINIC | Age: 75
End: 2020-12-01
Payer: MEDICARE

## 2020-12-01 VITALS — TEMPERATURE: 97.6 F | HEART RATE: 60 BPM | SYSTOLIC BLOOD PRESSURE: 129 MMHG | DIASTOLIC BLOOD PRESSURE: 77 MMHG

## 2020-12-01 PROCEDURE — 99215 OFFICE O/P EST HI 40 MIN: CPT | Mod: 25

## 2020-12-01 PROCEDURE — 20610 DRAIN/INJ JOINT/BURSA W/O US: CPT | Mod: RT

## 2020-12-01 PROCEDURE — 73030 X-RAY EXAM OF SHOULDER: CPT | Mod: 50

## 2020-12-01 PROCEDURE — 99072 ADDL SUPL MATRL&STAF TM PHE: CPT

## 2020-12-01 RX ORDER — LIDOCAINE HYDROCHLORIDE 10 MG/ML
1 INJECTION, SOLUTION INFILTRATION; PERINEURAL
Refills: 0 | Status: COMPLETED | OUTPATIENT
Start: 2020-12-01

## 2020-12-01 RX ORDER — METHYLPRED ACET/NACL,ISO-OS/PF 40 MG/ML
40 VIAL (ML) INJECTION
Qty: 1 | Refills: 0 | Status: COMPLETED | OUTPATIENT
Start: 2020-12-01

## 2020-12-01 RX ADMIN — Medication %: at 00:00

## 2020-12-01 RX ADMIN — METHYLPREDNISOLONE ACETATE MG/ML: 40 INJECTION, SUSPENSION INTRA-ARTICULAR; INTRALESIONAL; INTRAMUSCULAR; SOFT TISSUE at 00:00

## 2021-01-19 ENCOUNTER — APPOINTMENT (OUTPATIENT)
Dept: ORTHOPEDIC SURGERY | Facility: CLINIC | Age: 76
End: 2021-01-19
Payer: MEDICARE

## 2021-01-19 VITALS — WEIGHT: 150 LBS | HEIGHT: 59 IN | TEMPERATURE: 96.8 F | BODY MASS INDEX: 30.24 KG/M2

## 2021-01-19 PROCEDURE — 99214 OFFICE O/P EST MOD 30 MIN: CPT

## 2021-01-19 PROCEDURE — 99072 ADDL SUPL MATRL&STAF TM PHE: CPT

## 2021-02-11 RX ORDER — DICLOFENAC SODIUM 1% 10 MG/G
1 GEL TOPICAL TWICE DAILY
Qty: 1 | Refills: 0 | Status: ACTIVE | COMMUNITY
Start: 2021-02-11 | End: 1900-01-01

## 2021-03-30 ENCOUNTER — APPOINTMENT (OUTPATIENT)
Dept: ORTHOPEDIC SURGERY | Facility: CLINIC | Age: 76
End: 2021-03-30

## 2021-05-04 ENCOUNTER — APPOINTMENT (OUTPATIENT)
Dept: GASTROENTEROLOGY | Facility: CLINIC | Age: 76
End: 2021-05-04
Payer: MEDICARE

## 2021-05-04 VITALS
BODY MASS INDEX: 30.84 KG/M2 | OXYGEN SATURATION: 97 % | DIASTOLIC BLOOD PRESSURE: 79 MMHG | WEIGHT: 153 LBS | SYSTOLIC BLOOD PRESSURE: 125 MMHG | TEMPERATURE: 98 F | HEART RATE: 59 BPM | HEIGHT: 59 IN

## 2021-05-04 DIAGNOSIS — C50.919 MALIGNANT NEOPLASM OF UNSPECIFIED SITE OF UNSPECIFIED FEMALE BREAST: ICD-10-CM

## 2021-05-04 DIAGNOSIS — R13.10 DYSPHAGIA, UNSPECIFIED: ICD-10-CM

## 2021-05-04 DIAGNOSIS — Z01.818 ENCOUNTER FOR OTHER PREPROCEDURAL EXAMINATION: ICD-10-CM

## 2021-05-04 LAB
BASOPHILS # BLD AUTO: 0.04 K/UL
BASOPHILS NFR BLD AUTO: 0.6 %
EOSINOPHIL # BLD AUTO: 0.04 K/UL
EOSINOPHIL NFR BLD AUTO: 0.6 %
FERRITIN SERPL-MCNC: 64 NG/ML
HCT VFR BLD CALC: 43.7 %
HGB BLD-MCNC: 13.6 G/DL
IMM GRANULOCYTES NFR BLD AUTO: 0.2 %
IRON SATN MFR SERPL: 25 %
IRON SERPL-MCNC: 90 UG/DL
LYMPHOCYTES # BLD AUTO: 1.91 K/UL
LYMPHOCYTES NFR BLD AUTO: 29.7 %
MAN DIFF?: NORMAL
MCHC RBC-ENTMCNC: 29.7 PG
MCHC RBC-ENTMCNC: 31.1 GM/DL
MCV RBC AUTO: 95.4 FL
MONOCYTES # BLD AUTO: 0.56 K/UL
MONOCYTES NFR BLD AUTO: 8.7 %
NEUTROPHILS # BLD AUTO: 3.88 K/UL
NEUTROPHILS NFR BLD AUTO: 60.2 %
PLATELET # BLD AUTO: 219 K/UL
RBC # BLD: 4.58 M/UL
RBC # FLD: 13.2 %
TIBC SERPL-MCNC: 355 UG/DL
UIBC SERPL-MCNC: 265 UG/DL
WBC # FLD AUTO: 6.44 K/UL

## 2021-05-04 PROCEDURE — 99204 OFFICE O/P NEW MOD 45 MIN: CPT

## 2021-05-04 PROCEDURE — 99072 ADDL SUPL MATRL&STAF TM PHE: CPT

## 2021-05-04 RX ORDER — IBUPROFEN 800 MG/1
TABLET, FILM COATED ORAL
Refills: 0 | Status: DISCONTINUED | COMMUNITY
End: 2021-05-04

## 2021-05-04 RX ORDER — PANTOPRAZOLE 40 MG/1
TABLET, DELAYED RELEASE ORAL DAILY
Refills: 0 | Status: ACTIVE | COMMUNITY

## 2021-05-04 RX ORDER — BETAMETHASONE DIPROPIONATE 0.5 MG/G
0.05 LOTION TOPICAL
Qty: 60 | Refills: 0 | Status: ACTIVE | COMMUNITY
Start: 2021-01-22

## 2021-05-04 RX ORDER — ATENOLOL 50 MG/1
TABLET ORAL TWICE DAILY
Refills: 0 | Status: ACTIVE | COMMUNITY

## 2021-05-04 RX ORDER — NAPROXEN 500 MG/1
500 TABLET ORAL
Qty: 180 | Refills: 0 | Status: ACTIVE | COMMUNITY
Start: 2021-04-13

## 2021-05-04 RX ORDER — AMOXICILLIN 500 MG/1
500 CAPSULE ORAL
Qty: 20 | Refills: 4 | Status: DISCONTINUED | COMMUNITY
Start: 2017-09-29 | End: 2021-05-04

## 2021-05-04 RX ORDER — OLOPATADINE HYDROCHLORIDE 7 MG/ML
0.7 SOLUTION OPHTHALMIC
Qty: 2 | Refills: 0 | Status: ACTIVE | COMMUNITY
Start: 2021-01-14

## 2021-05-04 RX ORDER — TRIAMCINOLONE ACETONIDE 1 MG/G
0.1 OINTMENT TOPICAL
Qty: 80 | Refills: 0 | Status: ACTIVE | COMMUNITY
Start: 2021-04-13

## 2021-05-04 RX ORDER — KETOCONAZOLE 20.5 MG/ML
2 SHAMPOO, SUSPENSION TOPICAL
Qty: 120 | Refills: 0 | Status: ACTIVE | COMMUNITY
Start: 2021-03-11

## 2021-05-04 RX ORDER — DIVALPROEX SODIUM 500 1/1
500 TABLET, EXTENDED RELEASE ORAL
Qty: 90 | Refills: 0 | Status: ACTIVE | COMMUNITY
Start: 2021-01-14

## 2021-05-04 RX ORDER — ATORVASTATIN CALCIUM 40 MG/1
40 TABLET, FILM COATED ORAL
Qty: 90 | Refills: 0 | Status: ACTIVE | COMMUNITY
Start: 2020-12-18

## 2021-05-04 NOTE — REASON FOR VISIT
[Initial Evaluation] : an initial evaluation [FreeTextEntry1] : Dysphagia, GERD, Hx of H.H. repair, Black stool, Hx of colon polyps

## 2021-05-04 NOTE — HISTORY OF PRESENT ILLNESS
[FreeTextEntry1] : Patient is a 76-year-old female with history of breast cancer with a lumpectomy followed by radiation and chemotherapy 8 years ago, asthma, and hypertension.\par Patient had a hiatal hernia repair 12 years ago and had done well for about 10 years.  Over the past year, patient developed recurrence of heartburn and regurgitation and more recently dysphagia to solids greater than liquids.  The symptoms seem to be getting worse.  She takes pantoprazole daily in the morning and has relief ~evening.\par \par Patient also has a history of colon polyps.  She has a history of fistula repair and hemorrhoidectomy many years ago.  Her bowel movements are regular but they have been black over the past 6 months.  She has 1 bowel movement per day.

## 2021-05-04 NOTE — ASSESSMENT
[FreeTextEntry1] : Patient with hiatal hernia repair 12 years ago.  She did well till about 1 year ago when she had recurrence of heartburn and regurgitation and more recently dysphagia to solids greater than liquids.  She will continue to take pantoprazole in the morning and we will add famotidine in the evening.  She will be scheduled for an upper endoscopy and possible dilatation.\par Patient also complains of black stool and has a history of colon polyps.  She will be scheduled for screening colonoscopy.  FOBT will be sent to the lab.  CBC and iron studies will be ordered.

## 2021-05-11 LAB — HEMOCCULT STL QL IA: NEGATIVE

## 2021-06-07 ENCOUNTER — APPOINTMENT (OUTPATIENT)
Dept: RHEUMATOLOGY | Facility: CLINIC | Age: 76
End: 2021-06-07
Payer: MEDICARE

## 2021-06-07 VITALS
DIASTOLIC BLOOD PRESSURE: 80 MMHG | SYSTOLIC BLOOD PRESSURE: 138 MMHG | RESPIRATION RATE: 16 BRPM | WEIGHT: 150 LBS | TEMPERATURE: 98 F | OXYGEN SATURATION: 97 % | BODY MASS INDEX: 30.24 KG/M2 | HEART RATE: 66 BPM | HEIGHT: 59 IN

## 2021-06-07 PROCEDURE — 99072 ADDL SUPL MATRL&STAF TM PHE: CPT

## 2021-06-07 PROCEDURE — 99204 OFFICE O/P NEW MOD 45 MIN: CPT

## 2021-06-07 RX ORDER — NORTRIPTYLINE HYDROCHLORIDE 25 MG/1
25 CAPSULE ORAL
Qty: 30 | Refills: 0 | Status: DISCONTINUED | COMMUNITY
Start: 2021-01-04 | End: 2021-06-07

## 2021-06-07 RX ORDER — METHOCARBAMOL 500 MG/1
500 TABLET, FILM COATED ORAL 3 TIMES DAILY
Qty: 90 | Refills: 0 | Status: DISCONTINUED | COMMUNITY
Start: 2020-11-23 | End: 2021-06-07

## 2021-06-07 RX ORDER — DICLOFENAC SODIUM 50 MG/1
50 TABLET, DELAYED RELEASE ORAL
Qty: 30 | Refills: 0 | Status: DISCONTINUED | COMMUNITY
Start: 2020-11-23 | End: 2021-06-07

## 2021-06-07 RX ORDER — TOPIRAMATE 25 MG/1
25 TABLET, COATED ORAL
Refills: 0 | Status: DISCONTINUED | COMMUNITY
End: 2021-06-07

## 2021-06-07 RX ORDER — MELOXICAM 15 MG/1
15 TABLET ORAL DAILY
Qty: 30 | Refills: 0 | Status: DISCONTINUED | COMMUNITY
Start: 2021-01-19 | End: 2021-06-07

## 2021-06-11 ENCOUNTER — NON-APPOINTMENT (OUTPATIENT)
Age: 76
End: 2021-06-11

## 2021-06-11 LAB
25(OH)D3 SERPL-MCNC: 41.8 NG/ML
ALBUMIN SERPL ELPH-MCNC: 4.2 G/DL
ALP BLD-CCNC: 74 U/L
ALT SERPL-CCNC: 17 U/L
ANION GAP SERPL CALC-SCNC: 15 MMOL/L
AST SERPL-CCNC: 22 U/L
BASOPHILS # BLD AUTO: 0.06 K/UL
BASOPHILS NFR BLD AUTO: 0.9 %
BILIRUB SERPL-MCNC: 0.3 MG/DL
BUN SERPL-MCNC: 21 MG/DL
CALCIUM SERPL-MCNC: 10 MG/DL
CCP AB SER IA-ACNC: <8 UNITS
CHLORIDE SERPL-SCNC: 103 MMOL/L
CO2 SERPL-SCNC: 23 MMOL/L
CREAT SERPL-MCNC: 0.94 MG/DL
EOSINOPHIL # BLD AUTO: 0.07 K/UL
EOSINOPHIL NFR BLD AUTO: 1.1 %
GLUCOSE SERPL-MCNC: 97 MG/DL
HCT VFR BLD CALC: 42.2 %
HGB BLD-MCNC: 13.4 G/DL
IMM GRANULOCYTES NFR BLD AUTO: 0.3 %
LYMPHOCYTES # BLD AUTO: 1.89 K/UL
LYMPHOCYTES NFR BLD AUTO: 29.2 %
MAN DIFF?: NORMAL
MCHC RBC-ENTMCNC: 30.1 PG
MCHC RBC-ENTMCNC: 31.8 GM/DL
MCV RBC AUTO: 94.8 FL
MONOCYTES # BLD AUTO: 0.61 K/UL
MONOCYTES NFR BLD AUTO: 9.4 %
NEUTROPHILS # BLD AUTO: 3.83 K/UL
NEUTROPHILS NFR BLD AUTO: 59.1 %
PLATELET # BLD AUTO: 211 K/UL
POTASSIUM SERPL-SCNC: 4.2 MMOL/L
PROT SERPL-MCNC: 6.6 G/DL
RBC # BLD: 4.45 M/UL
RBC # FLD: 13.9 %
RF+CCP IGG SER-IMP: NEGATIVE
RHEUMATOID FACT SER QL: <10 IU/ML
SODIUM SERPL-SCNC: 141 MMOL/L
WBC # FLD AUTO: 6.48 K/UL

## 2021-06-11 RX ORDER — GABAPENTIN 100 MG/1
100 CAPSULE ORAL
Qty: 30 | Refills: 0 | Status: ACTIVE | COMMUNITY
Start: 2021-06-11 | End: 1900-01-01

## 2021-06-11 NOTE — HISTORY OF PRESENT ILLNESS
[Currently Experiencing] : currently [Arthralgias] : arthralgias [Joint Deformity] : joint deformity [Decreased ROM] : decreased range of motion [Morning Stiffness] : morning stiffness [Myalgias] : myalgias [FreeTextEntry1] : 76 year old female here for evaluation of RA \par \par C/o pain in the lower back and neck as well her knees and shoulders. Has had her knees replaced between 4 -5 years ago. Also has known shoulder OA - considering replacement. Has had disc herniations and DJD in the spine. Has not done any epidural injections but has done PT several times and it did not work. Was given muscle relaxants which made her sleepy and did not help her with pain in the morning. 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 H/o breast cancer in 2008 s/p chemotherapy and radiation. \par \par Denies any constitutional symptoms - fevers, chills, malaise, weight loss, myalgias. No h/o rashes, uveitis, hair loss, mucosal ulcers, Raynaud's, sicca symptoms, GI or  issues, serositis, pleuritis, pericarditis, weakness or paresthesias. No h/o blood clots.\par \par HCM: Colonoscopy: due next week. DEXA: due to repeat in Aug 2021. Normal in the past. \par \par \par  [Anorexia] : no anorexia [Weight Loss] : no weight loss [Malaise] : no malaise [Fever] : no fever [Chills] : no chills [Fatigue] : no fatigue [Depression] : no depression [Malar Facial Rash] : no malar facial rash [Skin Lesions] : no lesions [Skin Nodules] : no skin nodules [Oral Ulcers] : no oral ulcers [Cough] : no cough [Dry Mouth] : no dry mouth [Dysphonia] : no dysphonia [Dysphagia] : no dysphagia [Shortness of Breath] : no shortness of breath [Chest Pain] : no chest pain [Joint Swelling] : no joint swelling [Joint Warmth] : no joint warmth [Falls] : no falls [Difficulty Standing] : no difficulty standing [Difficulty Walking] : no difficulty walking [Dyspnea] : no dyspnea [Muscle Weakness] : no muscle weakness [Muscle Spasms] : no muscle spasms [Muscle Cramping] : no muscle cramping [Visual Changes] : no visual changes [Eye Pain] : no eye pain [Eye Redness] : no eye redness [Dry Eyes] : no dry eyes

## 2021-06-11 NOTE — ASSESSMENT
[FreeTextEntry1] : 76 year old female with long standing polyarticular pain here for evaluation of RA. \par \par 1. Polyarthralgia: more consistent with OA rather than RA. Will check labs today. Discussed starting Cymbalta 20 mg daily for chronic pain. Risks, benefits and side effects of the medication discussed with the patient in detail. Prescription sent to the pharmacy and printed medication information for patient. Advised to call me if she experience any side effects\par \par 2. Bone health: Ca+D as needed. DEXA to be repeated in Aug 2021\par \par Follow up in 6 weeks

## 2021-06-11 NOTE — REVIEW OF SYSTEMS
[As Noted in HPI] : as noted in HPI [Arthralgias] : arthralgias [Joint Pain] : joint pain [Joint Stiffness] : joint stiffness [Limb Pain] : limb pain [Negative] : Heme/Lymph [Joint Swelling] : no joint swelling [Limb Swelling] : no limb swelling

## 2021-06-11 NOTE — PHYSICAL EXAM
[General Appearance - Alert] : alert [General Appearance - In No Acute Distress] : in no acute distress [Sclera] : the sclera and conjunctiva were normal [PERRL With Normal Accommodation] : pupils were equal in size, round, and reactive to light [Extraocular Movements] : extraocular movements were intact [Outer Ear] : the ears and nose were normal in appearance [Oropharynx] : the oropharynx was normal [Neck Appearance] : the appearance of the neck was normal [Neck Cervical Mass (___cm)] : no neck mass was observed [Jugular Venous Distention Increased] : there was no jugular-venous distention [Thyroid Diffuse Enlargement] : the thyroid was not enlarged [Thyroid Nodule] : there were no palpable thyroid nodules [Auscultation Breath Sounds / Voice Sounds] : lungs were clear to auscultation bilaterally [Heart Rate And Rhythm] : heart rate was normal and rhythm regular [Heart Sounds] : normal S1 and S2 [Heart Sounds Gallop] : no gallops [Murmurs] : no murmurs [Heart Sounds Pericardial Friction Rub] : no pericardial rub [Full Pulse] : the pedal pulses are present [Edema] : there was no peripheral edema [Cervical Lymph Nodes Enlarged Posterior Bilaterally] : posterior cervical [Cervical Lymph Nodes Enlarged Anterior Bilaterally] : anterior cervical [Supraclavicular Lymph Nodes Enlarged Bilaterally] : supraclavicular [Axillary Lymph Nodes Enlarged Bilaterally] : axillary [No CVA Tenderness] : no ~M costovertebral angle tenderness [No Spinal Tenderness] : no spinal tenderness [Skin Color & Pigmentation] : normal skin color and pigmentation [Skin Turgor] : normal skin turgor [] : no rash [No Focal Deficits] : no focal deficits [Oriented To Time, Place, And Person] : oriented to person, place, and time [Impaired Insight] : insight and judgment were intact [Affect] : the affect was normal [FreeTextEntry1] : OA changes on the hands. TTP of the PIPs and CMC joint of the thumb. Reduced ROM of the R shoulder.

## 2021-06-13 ENCOUNTER — APPOINTMENT (OUTPATIENT)
Dept: DISASTER EMERGENCY | Facility: CLINIC | Age: 76
End: 2021-06-13

## 2021-06-13 LAB — SARS-COV-2 N GENE NPH QL NAA+PROBE: NOT DETECTED

## 2021-06-16 ENCOUNTER — OUTPATIENT (OUTPATIENT)
Dept: OUTPATIENT SERVICES | Facility: HOSPITAL | Age: 76
LOS: 1 days | Discharge: HOME | End: 2021-06-16
Payer: MEDICARE

## 2021-06-16 ENCOUNTER — RESULT REVIEW (OUTPATIENT)
Age: 76
End: 2021-06-16

## 2021-06-16 ENCOUNTER — APPOINTMENT (OUTPATIENT)
Dept: GASTROENTEROLOGY | Facility: HOSPITAL | Age: 76
End: 2021-06-16
Payer: MEDICARE

## 2021-06-16 VITALS
WEIGHT: 141.98 LBS | TEMPERATURE: 99 F | HEIGHT: 58 IN | OXYGEN SATURATION: 98 % | DIASTOLIC BLOOD PRESSURE: 84 MMHG | HEART RATE: 65 BPM | RESPIRATION RATE: 16 BRPM | SYSTOLIC BLOOD PRESSURE: 133 MMHG

## 2021-06-16 VITALS
SYSTOLIC BLOOD PRESSURE: 103 MMHG | HEART RATE: 60 BPM | RESPIRATION RATE: 18 BRPM | DIASTOLIC BLOOD PRESSURE: 75 MMHG | OXYGEN SATURATION: 96 %

## 2021-06-16 DIAGNOSIS — Z87.19 PERSONAL HISTORY OF OTHER DISEASES OF THE DIGESTIVE SYSTEM: Chronic | ICD-10-CM

## 2021-06-16 DIAGNOSIS — Z96.653 PRESENCE OF ARTIFICIAL KNEE JOINT, BILATERAL: Chronic | ICD-10-CM

## 2021-06-16 DIAGNOSIS — Z98.890 OTHER SPECIFIED POSTPROCEDURAL STATES: Chronic | ICD-10-CM

## 2021-06-16 DIAGNOSIS — Z90.49 ACQUIRED ABSENCE OF OTHER SPECIFIED PARTS OF DIGESTIVE TRACT: Chronic | ICD-10-CM

## 2021-06-16 DIAGNOSIS — Z90.12 ACQUIRED ABSENCE OF LEFT BREAST AND NIPPLE: Chronic | ICD-10-CM

## 2021-06-16 DIAGNOSIS — Z86.010 PERSONAL HISTORY OF COLONIC POLYPS: ICD-10-CM

## 2021-06-16 DIAGNOSIS — Z90.89 ACQUIRED ABSENCE OF OTHER ORGANS: Chronic | ICD-10-CM

## 2021-06-16 PROCEDURE — 45378 DIAGNOSTIC COLONOSCOPY: CPT | Mod: 59

## 2021-06-16 PROCEDURE — 88305 TISSUE EXAM BY PATHOLOGIST: CPT | Mod: 26

## 2021-06-16 PROCEDURE — 43239 EGD BIOPSY SINGLE/MULTIPLE: CPT

## 2021-06-16 RX ORDER — DIVALPROEX SODIUM 500 MG/1
1 TABLET, DELAYED RELEASE ORAL
Qty: 0 | Refills: 0 | DISCHARGE

## 2021-06-16 NOTE — ASU PATIENT PROFILE, ADULT - PMH
Essential hypertension    Gastroesophageal reflux disease, esophagitis presence not specified    Hyperlipidemia, unspecified hyperlipidemia type    Malignant neoplasm of left female breast, unspecified estrogen receptor status, unspecified site of breast  2008- treated with lumpectomy, chemotherapy and radiation therapy.  Nephrolithiasis    Nonintractable migraine, unspecified migraine type    OA (osteoarthritis)    Uncomplicated asthma, unspecified asthma severity  mild-controlled w/ medication- has never been in ER or hospitalized for asthma. never had a severe exacerbation.

## 2021-06-16 NOTE — ASU PATIENT PROFILE, ADULT - PSH
H/O hiatal hernia  surgery approx 2002  S/P appendectomy    S/P carpal tunnel release  right  S/P lumpectomy, left breast  approx 2008  S/P ovarian cystectomy    S/P tonsillectomy    S/p total knee replacement, bilateral  2017

## 2021-06-17 LAB — SURGICAL PATHOLOGY STUDY: SIGNIFICANT CHANGE UP

## 2021-07-06 ENCOUNTER — RX RENEWAL (OUTPATIENT)
Age: 76
End: 2021-07-06

## 2021-07-06 RX ORDER — DULOXETINE HYDROCHLORIDE 20 MG/1
20 CAPSULE, DELAYED RELEASE PELLETS ORAL
Qty: 30 | Refills: 0 | Status: ACTIVE | COMMUNITY
Start: 2021-06-07 | End: 1900-01-01

## 2021-08-03 ENCOUNTER — APPOINTMENT (OUTPATIENT)
Dept: GASTROENTEROLOGY | Facility: CLINIC | Age: 76
End: 2021-08-03

## 2021-10-08 ENCOUNTER — APPOINTMENT (OUTPATIENT)
Dept: GASTROENTEROLOGY | Facility: CLINIC | Age: 76
End: 2021-10-08

## 2021-11-02 ENCOUNTER — APPOINTMENT (OUTPATIENT)
Dept: ULTRASOUND IMAGING | Facility: CLINIC | Age: 76
End: 2021-11-02
Payer: MEDICARE

## 2021-11-02 ENCOUNTER — OUTPATIENT (OUTPATIENT)
Dept: OUTPATIENT SERVICES | Facility: HOSPITAL | Age: 76
LOS: 1 days | End: 2021-11-02
Payer: MEDICARE

## 2021-11-02 DIAGNOSIS — Z90.49 ACQUIRED ABSENCE OF OTHER SPECIFIED PARTS OF DIGESTIVE TRACT: Chronic | ICD-10-CM

## 2021-11-02 DIAGNOSIS — Z98.890 OTHER SPECIFIED POSTPROCEDURAL STATES: Chronic | ICD-10-CM

## 2021-11-02 DIAGNOSIS — Z00.8 ENCOUNTER FOR OTHER GENERAL EXAMINATION: ICD-10-CM

## 2021-11-02 DIAGNOSIS — Z90.89 ACQUIRED ABSENCE OF OTHER ORGANS: Chronic | ICD-10-CM

## 2021-11-02 DIAGNOSIS — Z87.19 PERSONAL HISTORY OF OTHER DISEASES OF THE DIGESTIVE SYSTEM: Chronic | ICD-10-CM

## 2021-11-02 DIAGNOSIS — Z90.12 ACQUIRED ABSENCE OF LEFT BREAST AND NIPPLE: Chronic | ICD-10-CM

## 2021-11-02 DIAGNOSIS — Z96.653 PRESENCE OF ARTIFICIAL KNEE JOINT, BILATERAL: Chronic | ICD-10-CM

## 2021-11-02 PROCEDURE — 76770 US EXAM ABDO BACK WALL COMP: CPT | Mod: 26

## 2021-11-02 PROCEDURE — 76770 US EXAM ABDO BACK WALL COMP: CPT

## 2021-11-23 ENCOUNTER — APPOINTMENT (OUTPATIENT)
Dept: GASTROENTEROLOGY | Facility: CLINIC | Age: 76
End: 2021-11-23
Payer: MEDICARE

## 2021-11-23 VITALS
WEIGHT: 145 LBS | SYSTOLIC BLOOD PRESSURE: 143 MMHG | OXYGEN SATURATION: 99 % | BODY MASS INDEX: 29.23 KG/M2 | DIASTOLIC BLOOD PRESSURE: 89 MMHG | HEART RATE: 60 BPM | TEMPERATURE: 96.6 F | HEIGHT: 59 IN

## 2021-11-23 DIAGNOSIS — Z86.010 PERSONAL HISTORY OF COLONIC POLYPS: ICD-10-CM

## 2021-11-23 DIAGNOSIS — R10.814 LEFT LOWER QUADRANT ABDOMINAL TENDERNESS: ICD-10-CM

## 2021-11-23 DIAGNOSIS — R10.30 LOWER ABDOMINAL PAIN, UNSPECIFIED: ICD-10-CM

## 2021-11-23 DIAGNOSIS — K62.89 OTHER SPECIFIED DISEASES OF ANUS AND RECTUM: ICD-10-CM

## 2021-11-23 PROCEDURE — 36415 COLL VENOUS BLD VENIPUNCTURE: CPT

## 2021-11-23 PROCEDURE — 99214 OFFICE O/P EST MOD 30 MIN: CPT | Mod: 25

## 2021-11-23 RX ORDER — HYDROCORTISONE 25 MG/G
2.5 CREAM TOPICAL
Qty: 1 | Refills: 0 | Status: ACTIVE | COMMUNITY
Start: 2021-01-11 | End: 1900-01-01

## 2021-11-23 RX ORDER — FAMOTIDINE 40 MG/1
40 TABLET, FILM COATED ORAL
Qty: 30 | Refills: 5 | Status: ACTIVE | COMMUNITY
Start: 2021-05-04 | End: 1900-01-01

## 2021-11-23 RX ORDER — SODIUM PICOSULFATE, MAGNESIUM OXIDE, AND ANHYDROUS CITRIC ACID 10; 3.5; 12 MG/160ML; G/160ML; G/160ML
10-3.5-12 MG-GM LIQUID ORAL
Qty: 1 | Refills: 0 | Status: DISCONTINUED | COMMUNITY
Start: 2021-05-04 | End: 2021-11-23

## 2021-11-23 RX ORDER — METRONIDAZOLE 250 MG/1
250 TABLET ORAL
Qty: 21 | Refills: 0 | Status: ACTIVE | COMMUNITY
Start: 2021-11-23 | End: 1900-01-01

## 2021-11-23 NOTE — ASSESSMENT
[FreeTextEntry1] : Patient still has heartburn and hoarseness.  We will add famotidine 40 mg in the evening to her regimen.\par \par She has been complaining of some diarrhea and dark stool.  She does have a left lower quadrant tenderness.  She will be given a 7-day course of Cipro/Flagyl for possible diverticulitis.\par FOBT will be sent to the lab because of the dark stool.  CBC will be drawn.\par She will be given a hemorrhoidal cream for her proctalgia.  This seems to have helped her in the past.

## 2021-11-23 NOTE — HISTORY OF PRESENT ILLNESS
[FreeTextEntry1] : Patient is a 76-year-old female with history of breast cancer with a lumpectomy followed by radiation and chemotherapy 8 years ago, asthma, and hypertension.\par Patient had a hiatal hernia repair 12 years ago and had done well for about 10 years.  Over the past year, patient developed recurrence of heartburn and regurgitation and more recently dysphagia to solids greater than liquids.  The symptoms seem to be getting worse.  She had an upper endoscopy in June that revealed a competent LES.  The GE junction was found at 36 cm.  The scope was passed easily.  A dilator was used and the GE junction was dilated from 10 mm to 15 mm with TTS balloons.  She also had fundic gland polyps that were benign.\par She still continues to complain of hoarseness and fairly frequent heartburn.  This is relieved with Tums.  She takes pantoprazole in the morning.\par \par \par Patient also has a history of colon polyps.  She has a history of fistula repair and hemorrhoidectomy many years ago.  She has noted some diarrhea over the past several months with dark stool.  This is associated with some right lower quadrant discomfort.  There is no blood in the stool.  The symptoms are not nocturnal.  She does complain of some burning in the rectal area..

## 2021-11-28 LAB
BASOPHILS # BLD AUTO: 0.04 K/UL
BASOPHILS NFR BLD AUTO: 0.6 %
EOSINOPHIL # BLD AUTO: 0.08 K/UL
EOSINOPHIL NFR BLD AUTO: 1.1 %
HCT VFR BLD CALC: 43.8 %
HGB BLD-MCNC: 14 G/DL
IMM GRANULOCYTES NFR BLD AUTO: 0.3 %
LYMPHOCYTES # BLD AUTO: 2.06 K/UL
LYMPHOCYTES NFR BLD AUTO: 29.2 %
MAN DIFF?: NORMAL
MCHC RBC-ENTMCNC: 30.6 PG
MCHC RBC-ENTMCNC: 32 GM/DL
MCV RBC AUTO: 95.6 FL
MONOCYTES # BLD AUTO: 0.48 K/UL
MONOCYTES NFR BLD AUTO: 6.8 %
NEUTROPHILS # BLD AUTO: 4.37 K/UL
NEUTROPHILS NFR BLD AUTO: 62 %
PLATELET # BLD AUTO: 209 K/UL
RBC # BLD: 4.58 M/UL
RBC # FLD: 13.4 %
WBC # FLD AUTO: 7.05 K/UL

## 2021-11-29 LAB — HEMOCCULT STL QL IA: NEGATIVE

## 2021-12-21 ENCOUNTER — APPOINTMENT (OUTPATIENT)
Dept: GASTROENTEROLOGY | Facility: CLINIC | Age: 76
End: 2021-12-21
Payer: MEDICARE

## 2021-12-21 VITALS
WEIGHT: 141 LBS | HEIGHT: 59 IN | TEMPERATURE: 97.4 F | RESPIRATION RATE: 17 BRPM | HEART RATE: 53 BPM | OXYGEN SATURATION: 99 % | DIASTOLIC BLOOD PRESSURE: 64 MMHG | SYSTOLIC BLOOD PRESSURE: 142 MMHG | BODY MASS INDEX: 28.43 KG/M2

## 2021-12-21 DIAGNOSIS — K21.9 GASTRO-ESOPHAGEAL REFLUX DISEASE W/OUT ESOPHAGITIS: ICD-10-CM

## 2021-12-21 DIAGNOSIS — K92.1 MELENA: ICD-10-CM

## 2021-12-21 PROCEDURE — 99213 OFFICE O/P EST LOW 20 MIN: CPT

## 2021-12-21 RX ORDER — ALUMINUM HYDROXIDE AND MAGNESIUM CARBONATE 95; 358 MG/15ML; MG/15ML
95-358 LIQUID ORAL
Qty: 1 | Refills: 0 | Status: ACTIVE | COMMUNITY
Start: 2021-12-21 | End: 1900-01-01

## 2021-12-22 NOTE — ASSESSMENT
[FreeTextEntry1] : Attending Attestation \par \par As per Dr Perez patient will take Pantoprazole and Gaviscon. Medication reviewed side effects and adverse reactions. \par A low acid / reflux diet was discussed in great detail including not smoking, not drinking alcohol, and not\par consuming foods that irritate the esophagus. It is helpful to eat small meals throughout the day instead\par of large meals. You should avoid eating before bedtime or lying down after you eat. It can be helpful to\par raise the head of your bed six inches. Additionally, you should maintain a healthy weight and good\par posture.. The patient was given written material to take home and review. \par \par Patient will f/u in office in 6 weeks. \par \par Time spent with patient 25 min\par \par Patient verbalized understanding of all information provided. All questions answered and reviewed. \par \par

## 2021-12-22 NOTE — HISTORY OF PRESENT ILLNESS
[de-identified] : Patient is a 76-year-old female with history of breast cancer with a lumpectomy followed by radiation and chemotherapy 8 years ago, asthma, and hypertension.\par Patient had a hiatal hernia repair 12 years ago and had done well for about 10 years. Over the past year, patient developed recurrence of heartburn and regurgitation and more recently dysphagia to solids greater than liquids. The symptoms seem to be getting worse. She had an upper endoscopy in June that revealed a competent LES. The GE junction was found at 36 cm. The scope was passed easily. A dilator was used and the GE junction was dilated from 10 mm to 15 mm with TTS balloons. She also had fundic gland polyps that were benign.\par She still continues to complain of hoarseness and fairly frequent heartburn. This is relieved with Tums. She stopped taking Famotidine 40 mg as per Dr Perez request. She complains of feeling dizzy and not well she stopped 1 week ago. \par FOBT test preformed on 11/23/21 negative and CBC 11/23/21 normal. \par Patient also has a history of colon polyps. She has a history of fistula repair and hemorrhoidectomy many years ago. She has noted some diarrhea over the past several months with dark stool. This is associated with some right lower quadrant discomfort. There is no blood in the stool. The symptoms are not nocturnal. She does complain of some burning in the rectal area. \par  \par

## 2021-12-22 NOTE — PHYSICAL EXAM
[General Appearance - Alert] : alert [General Appearance - In No Acute Distress] : in no acute distress [Sclera] : the sclera and conjunctiva were normal [PERRL With Normal Accommodation] : pupils were equal in size, round, and reactive to light [Extraocular Movements] : extraocular movements were intact [Outer Ear] : the ears and nose were normal in appearance [Oropharynx] : the oropharynx was normal [Neck Appearance] : the appearance of the neck was normal [Neck Cervical Mass (___cm)] : no neck mass was observed [Jugular Venous Distention Increased] : there was no jugular-venous distention [Thyroid Diffuse Enlargement] : the thyroid was not enlarged [Thyroid Nodule] : there were no palpable thyroid nodules [] : no respiratory distress [Auscultation Breath Sounds / Voice Sounds] : lungs were clear to auscultation bilaterally [Heart Rate And Rhythm] : heart rate was normal and rhythm regular [Heart Sounds] : normal S1 and S2 [Heart Sounds Gallop] : no gallops [Murmurs] : no murmurs [Heart Sounds Pericardial Friction Rub] : no pericardial rub [Flat] : flat [Soft, Nontender] : the abdomen was soft and nontender [Normal] : normal to percussion [Cervical Lymph Nodes Enlarged Posterior Bilaterally] : posterior cervical [Cervical Lymph Nodes Enlarged Anterior Bilaterally] : anterior cervical [Supraclavicular Lymph Nodes Enlarged Bilaterally] : supraclavicular [Axillary Lymph Nodes Enlarged Bilaterally] : axillary [Femoral Lymph Nodes Enlarged Bilaterally] : femoral [Inguinal Lymph Nodes Enlarged Bilaterally] : inguinal [No CVA Tenderness] : no ~M costovertebral angle tenderness [No Spinal Tenderness] : no spinal tenderness [Abnormal Walk] : normal gait [Nail Clubbing] : no clubbing  or cyanosis of the fingernails [Musculoskeletal - Swelling] : no joint swelling seen [Motor Tone] : muscle strength and tone were normal [Deep Tendon Reflexes (DTR)] : deep tendon reflexes were 2+ and symmetric [Sensation] : the sensory exam was normal to light touch and pinprick [No Focal Deficits] : no focal deficits [Oriented To Time, Place, And Person] : oriented to person, place, and time [Impaired Insight] : insight and judgment were intact [Affect] : the affect was normal [Firm] : not firm [Rigid] : not rigid [Rebound] : no rebound [Guarding] : no guarding [Davidson's] : a negative Davidson's sign

## 2022-01-21 RX ORDER — PANTOPRAZOLE 20 MG/1
20 TABLET, DELAYED RELEASE ORAL
Qty: 60 | Refills: 3 | Status: ACTIVE | COMMUNITY
Start: 2021-12-21 | End: 1900-01-01
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
## 2022-02-08 ENCOUNTER — APPOINTMENT (OUTPATIENT)
Dept: GASTROENTEROLOGY | Facility: CLINIC | Age: 77
End: 2022-02-08

## 2022-02-08 NOTE — H&P PST ADULT - PSYCHIATRIC
----- Message from [facility] Piedad Nagel. Pooja Espana sent at 2/7/2022  5:10 PM CST -----  Regarding: Francisca Victor,  I am Bobbi Estrada, and I am contacting you in regard to my mom Hilda Vallecillo 02/21/1939. We had her scheduled to take a CT scan on 1/26/22, the diagnostic main department could not start scan. My mom's veins are very small, deep, and move, she is considered a hard stick, so they were unable to start the IV contrast.   We were wondering if the CT scan can be done without the contrast, or is the best way to rule out Sarcoidosis is to use the contrast?  Thank you for your time. Sincerely,  Rosie Wallace.  Pooja Espana negative Affect and characteristics of appearance, verbalizations, behaviors are appropriate

## 2022-02-23 ENCOUNTER — APPOINTMENT (OUTPATIENT)
Dept: ORTHOPEDIC SURGERY | Facility: CLINIC | Age: 77
End: 2022-02-23
Payer: MEDICARE

## 2022-02-23 ENCOUNTER — NON-APPOINTMENT (OUTPATIENT)
Age: 77
End: 2022-02-23

## 2022-02-23 VITALS
BODY MASS INDEX: 28.34 KG/M2 | HEIGHT: 58 IN | WEIGHT: 135 LBS | DIASTOLIC BLOOD PRESSURE: 74 MMHG | SYSTOLIC BLOOD PRESSURE: 128 MMHG | HEART RATE: 70 BPM

## 2022-02-23 DIAGNOSIS — M25.511 PAIN IN RIGHT SHOULDER: ICD-10-CM

## 2022-02-23 PROCEDURE — 99215 OFFICE O/P EST HI 40 MIN: CPT | Mod: 25

## 2022-02-23 PROCEDURE — 73030 X-RAY EXAM OF SHOULDER: CPT | Mod: RT

## 2022-02-23 PROCEDURE — 20610 DRAIN/INJ JOINT/BURSA W/O US: CPT | Mod: RT

## 2022-02-23 NOTE — DISCUSSION/SUMMARY
[de-identified] : This patient presents with evaluation regarding continued right shoulder pain.  Patient was seen previously where she had a subacromial steroid injection with good relief of the symptoms.  She presents today for follow-up and reevaluation.  Physical exam and x-rays show evidence of rotator cuff tear arthropathy of the shoulder.  I discussed the diagnosis with the patient length as well as treatment options.  I recommended a subacromial steroid injection for the patient.  This performed under sterile conditions on today's office visit.  I like to see him back in 2 weeks for follow-up to see how she is done with the injection.  In the meantime she should reduce her activities and avoid any excessive lifting pushing and pulling with the right upper extremity.  Instructions for ice and analgesics post injection was given to the patient.  Approximate 40 minutes spent in the evaluation and management of this office visit.

## 2022-02-23 NOTE — HISTORY OF PRESENT ILLNESS
[de-identified] : This patient presents today for reevaluation regarding recurrence of right shoulder pain.  She does have a history of right shoulder rotator cuff tear arthropathy.  She did have an injection last year.  This gave her significant improvement in her symptoms till recently.  She is on problems reaching overhead and reaching behind her back.  She notes occasional numbness and tingling.  Pain is 9 out of 10 today and worse with activity.  Pain is improved with rest.  Patient takes Tylenol intermittently with minimal relief.  She does have some radiation of pain down towards the elbow. No

## 2022-02-23 NOTE — REASON FOR VISIT
[Follow-Up Visit] : a follow-up visit for [FreeTextEntry2] : Right  shoulder /arm pain for 10 months,worsening

## 2022-02-23 NOTE — PROCEDURE
[de-identified] : Using sterile technique, 2cc of depomedrol (40mg/ml) and 3cc of 1% plain lidocaine was drawn up into a sterile 5cc syringe.  The posterior lateral corner of the right shoulder was then sterilely prepped with chlorhexidine and ethylene chloride spray was used as an anesthetic prior to injection.  The depomedrol/lidocaine mixture was injected into the subacromial space.  The patient tolerated the procedure well without difficulty.  The patient was given instructions on the use of ice and anti-inflammatories post injection site soreness.

## 2022-02-23 NOTE — PHYSICAL EXAM
[de-identified] : The patient appears well nourished  and in no apparent distress.  The patient is alert and oriented to person, place, and time.   Affect and mood appear normal.    The head is normocephalic and atraumatic.  The eyes reveal normal sclera and extra ocular muscles are intact.   The neck appears normal with no jugular venous distention or masses noted.   Skin shows normal turgor with no evidence of eczema or psoriasis.  No respiratory distress noted.  The patient ambulates with a normal gait.\par \par The right shoulder has moderate loss of range of motion.  There is pain with terminal range of motion.  There is tenderness about the glenohumeral joint anteriorly..  There is weakness about the rotator cuff.    There is a positive impingement sign and a positive Bhatt sign.  There is no soft tissue swelling.  There is no tenderness to palpation. There is no eccyhmosis.  There is no erythema or warmth.     There is no instability  No lymphadenopathy or edema is noted.  Pulses and capillary refill are normal.  Sensation is normal.    \par \par  [de-identified] : AP,outlet,  and glenoid and axillary views of the right shoulder were obtained.  There is a high riding humeral head with slight saucerization of the acromion consistent with chronic rotator cuff tear and early rotator cuff arthropathy.  There is some spurring about the inferior aspect of the glenoid.   There no fracture, dislocation, or subluxation.  \par

## 2022-02-24 ENCOUNTER — MED ADMIN CHARGE (OUTPATIENT)
Age: 77
End: 2022-02-24

## 2022-02-24 RX ORDER — LIDOCAINE HYDROCHLORIDE 10 MG/ML
1 INJECTION, SOLUTION INFILTRATION; PERINEURAL
Refills: 0 | Status: COMPLETED | OUTPATIENT
Start: 2022-02-24

## 2022-02-24 RX ORDER — METHYLPRED ACET/NACL,ISO-OS/PF 40 MG/ML
40 VIAL (ML) INJECTION
Qty: 1 | Refills: 0 | Status: COMPLETED | OUTPATIENT
Start: 2022-02-24

## 2022-02-24 RX ADMIN — Medication 3 %: at 00:00

## 2022-02-24 RX ADMIN — METHYLPREDNISOLONE ACETATE MG/ML: 40 INJECTION, SUSPENSION INTRA-ARTICULAR; INTRALESIONAL; INTRAMUSCULAR; SOFT TISSUE at 00:00

## 2022-03-09 ENCOUNTER — APPOINTMENT (OUTPATIENT)
Dept: ORTHOPEDIC SURGERY | Facility: CLINIC | Age: 77
End: 2022-03-09
Payer: MEDICARE

## 2022-03-09 VITALS — HEART RATE: 58 BPM | DIASTOLIC BLOOD PRESSURE: 85 MMHG | SYSTOLIC BLOOD PRESSURE: 140 MMHG

## 2022-03-09 PROCEDURE — 99213 OFFICE O/P EST LOW 20 MIN: CPT

## 2022-04-20 ENCOUNTER — APPOINTMENT (OUTPATIENT)
Dept: ORTHOPEDIC SURGERY | Facility: CLINIC | Age: 77
End: 2022-04-20
Payer: MEDICARE

## 2022-04-20 VITALS — SYSTOLIC BLOOD PRESSURE: 143 MMHG | HEART RATE: 60 BPM | DIASTOLIC BLOOD PRESSURE: 83 MMHG

## 2022-04-20 PROCEDURE — 99213 OFFICE O/P EST LOW 20 MIN: CPT

## 2022-04-20 RX ORDER — MELOXICAM 15 MG/1
15 TABLET ORAL DAILY
Qty: 30 | Refills: 0 | Status: ACTIVE | COMMUNITY
Start: 2022-04-20 | End: 1900-01-01

## 2022-10-04 ENCOUNTER — NON-APPOINTMENT (OUTPATIENT)
Age: 77
End: 2022-10-04

## 2022-10-04 ENCOUNTER — APPOINTMENT (OUTPATIENT)
Dept: ORTHOPEDIC SURGERY | Facility: CLINIC | Age: 77
End: 2022-10-04

## 2022-10-04 VITALS — HEART RATE: 60 BPM | SYSTOLIC BLOOD PRESSURE: 122 MMHG | DIASTOLIC BLOOD PRESSURE: 72 MMHG

## 2022-10-04 PROCEDURE — 99214 OFFICE O/P EST MOD 30 MIN: CPT | Mod: 25

## 2022-10-04 PROCEDURE — 20611 DRAIN/INJ JOINT/BURSA W/US: CPT | Mod: RT

## 2022-10-04 RX ORDER — LIDOCAINE HYDROCHLORIDE 5 MG/ML
0.5 INJECTION, SOLUTION INFILTRATION; PERINEURAL
Refills: 0 | Status: COMPLETED | OUTPATIENT
Start: 2022-10-04

## 2022-10-04 RX ORDER — METHYLPRED ACET/NACL,ISO-OS/PF 40 MG/ML
40 VIAL (ML) INJECTION
Qty: 1 | Refills: 0 | Status: COMPLETED | OUTPATIENT
Start: 2022-10-04

## 2022-10-04 RX ADMIN — LIDOCAINE HYDROCHLORIDE %: 5 INJECTION, SOLUTION INFILTRATION; PERINEURAL at 00:00

## 2022-10-04 RX ADMIN — METHYLPREDNISOLONE ACETATE MG/ML: 40 INJECTION, SUSPENSION INTRA-ARTICULAR; INTRALESIONAL; INTRAMUSCULAR; SOFT TISSUE at 00:00

## 2022-10-04 NOTE — HISTORY OF PRESENT ILLNESS
[de-identified] : This patient presents today for follow-up regarding right shoulder rotator cuff tear arthropathy.  She continues to complain of pain about the shoulder which is 7 out of 10.  Pain worse with activity such as reaching overhead and reaching behind the back.  She also has pain with pulling pushing and lifting.  Patient takes naproxen intermittently and Tylenol with slight improvement.

## 2022-10-04 NOTE — PHYSICAL EXAM
[de-identified] : The patient appears well nourished  and in no apparent distress.  The patient is alert and oriented to person, place, and time.   Affect and mood appear normal.    The head is normocephalic and atraumatic.  The eyes reveal normal sclera and extra ocular muscles are intact.   The neck appears normal with no jugular venous distention or masses noted.   Skin shows normal turgor with no evidence of eczema or psoriasis.  No respiratory distress noted.  The patient ambulates with a normal gait.\par \par The right shoulder has moderate loss of range of motion.  Forward flexion 150, external rotation 30, abduction 80, internal rotation to the mid lumbar spine.  There is pain with terminal range of motion.  There is tenderness about the glenohumeral joint anteriorly..  There is weakness about the rotator cuff.    There is a positive impingement sign and a positive Bhatt sign.  There is no soft tissue swelling.  There is no tenderness to palpation. There is no eccyhmosis.  There is no erythema or warmth.     There is no instability  No lymphadenopathy or edema is noted.  Pulses and capillary refill are normal.  Sensation is normal.    \par \par

## 2022-10-04 NOTE — PROCEDURE
[de-identified] : Using sterile technique, 2cc of depomedrol (40mg/ml) and 3cc of 1% plain lidocaine was drawn up into a sterile 5cc syringe.  The posterior lateral corner of the right shoulder was then sterilely prepped with chlorhexidine and ethylene chloride spray was used as an anesthetic prior to injection.  The subacromial space was imaged with the Hic[employer] Lumify ultrasound probe.  The depomedrol/lidocaine mixture was injected into the subacromial space under ultrasound guidance.  A spot image of the injection was saved..  The patient tolerated the procedure well without difficulty.  The patient was given instructions on the use of ice and anti-inflammatories post injection site soreness.

## 2022-10-04 NOTE — REASON FOR VISIT
[Follow-Up Visit] : a follow-up visit for [Family Member] : family member [FreeTextEntry2] : Right rotator cuff arthropathy

## 2022-10-04 NOTE — DISCUSSION/SUMMARY
[de-identified] : Patient presents today for follow-up regarding right shoulder rotator cuff tear arthropathy.  Patient's continued pain about the right shoulder.  On the last visit she could not have the injection as it was too close to her previous injection.  On today's visit I discussed further treatment options and performed a steroid injection to the right shoulder per instructions are given postinjection for ice analgesics and modification of activities.  I will see the patient back in the office if the symptoms worsen.  At least 30 minutes was spent performing the evaluation and management on today's office visit.

## 2022-10-28 NOTE — H&P PST ADULT - TEACHING/LEARNING RELIGIOUS CONSIDERATIONS
[facility]  [facility]  [facility]  Phone [phone] Fax [phone]    DATE OF CLINIC VISIT: 10/28/2022  : 1950    Chief Complaint:   Chief Complaint   Patient presents with   • Congestive Heart Failure       History of Present Illness:   Patient was seen today in [facility]. Patient is a 72 year old male with history of atrial fibrillation, hypertension, HL, CKD, TAMICA, obesity, hypothyroidism, chronic HF and mod-severe MR.    Patient was seen by Dr. Mendenhall 10/7/22. NT proBNP was elevated at 2,002. Patient was advised to take jardiance daily. He is scheduled for repeat echocardiogram 10/29/22 to re-evaluate tricuspid valve regurgitation.     Patient was seen in clinic on 10/21/22. His metolazone was restarted by cardiology 3 days prior to visit due to increased weight and SOB. Patient reported little relief. Patient was up 4 pounds from previous visit. He was complaining of SOB at rest and could not get comfortable in the recliner. He was frequently standing and moving to a tripod position. He was sent to the ED for further evaluation and management.    He presents to clinic for post-hospital follow up . He is accompanied by his wife. Medications reviewed. Patient does not have a medication list and is unaware of what he is taking. Patient is feeling better since discharge. He complains of fatigue. Weight is down ~10 pounds. Patient denies CP, dizziness, or palpitations. Patient sleeps on 1-2 pillows. He reports waking up several times during the night. Good appetite. Functional capacity decreased due to complaints of fatigue. Patient is not very active, he watches television most of the day. He is compliant with medications. His weight at home this morning was 252 pounds. Patient is rescheduled for repeat echocardiogram tomorrow. He has follow up with Dr. Mendenhall next month. He had labs done today per cardiology.    Past Medical History:    Diagnosis Date   • Atrial fibrillation (CMS/HCC)    • Chronic back pain     improved since CANDE in 2022   • CKD (chronic kidney disease)    • Congestive cardiac failure (CMS/HCC)    • Depression    • Elevated PSA    • Essential (primary) hypertension    • High cholesterol    • Hyperbilirubinemia    • IFG (impaired fasting glucose)    • Malignant neoplasm (CMS/HCC)     non hodgkins lyphoma- 2008 and 2020- chemo completed in about 2020   • Moderate to severe mitral regurgitation     on echo 2/22/22   • Peripheral neuropathy    • Sleep apnea     USES CPAP   • Thyroid condition        Allergies:   Allergies   Allergen Reactions   • Lisinopril Cough        VS:   Vitals:    10/28/22 1309   BP: 107/52   Pulse: 69        Weight:  Wt Readings from Last 4 Encounters:   10/28/22 116.8 kg (257 lb 8 oz)   10/21/22 117.8 kg (259 lb 11.2 oz)   10/20/22 122.6 kg (270 lb 4.5 oz)   10/07/22 121.1 kg (266 lb 15.6 oz)       ROS:   12 point ROS obtained and is negative except for HPI    Physical Exam:  Constitutional: Ill appearing. SOB at rest  Neurological: Patient is alert and appropriate  Head: Normocephalic and atraumatic  Eyes: Conjunctivae are normal   Neck: Neck supple, No JVD present  Cardiac: Normal rate, irregular rhythm and normal heart sounds. Exam reveals no gallop and no friction rub, no murmur appreciated   Pulmonary/Chest: Effort is normal and breath sound are clear. No respiratory distress. No wheezing , No rales  Abdominal: Bowel sounds are normal, Abdomen is distended  Extremities: No edema   Skin: Skin is warm and dry. No rash noted. There is no diaphoresis, no erythema, no pallor  Psychiatric: pleasant mood    LABS:   Sodium (mmol/L)   Date Value   10/21/2022 138     Potassium (mmol/L)   Date Value   10/21/2022 3.4     BUN (mg/dL)   Date Value   10/21/2022 29 (H)     Creatinine (mg/dL)   Date Value   10/21/2022 1.65 (H)      No results found for: BNP     ECHO: 6/7/22    STUDY CONCLUSIONS  SUMMARY:     1. Left  ventricle: The cavity size is normal. Wall thickness is normal.     The ejection fraction was measured by visual estimation. The ejection     fraction is 55%.  2. Mitral valve: Moderate regurgitation.  3. Left atrium: The atrium is severely dilated.  4. Right ventricle: The cavity size is normal. Wall thickness is normal.     Systolic function is normal.  5. Right atrium: The atrium is severely dilated.  6. Atrial septum: Agitated saline contrast study shows no shunt.  7. Tricuspid valve: Moderate regurgitation.  Impressions:   Negative agitated saline (bubble study).        Medications:   Current Outpatient Medications   Medication Sig Dispense Refill   • metOLazone (ZAROXOLYN) 2.5 MG tablet Take 2.5 mg by mouth every other day.     • fexofenadine (ALLEGRA) 180 MG tablet Take 180 mg by mouth daily.     • empagliflozin (JARDIANCE) 10 MG tablet Take 1 tablet by mouth daily (before breakfast). 30 tablet 1   • albuterol 108 (90 Base) MCG/ACT inhaler Inhale 2 puffs into the lungs every 4 hours as needed for Shortness of Breath or Wheezing. 1 each 3   • umeclidinium bromide (INCRUSE ELLIPTA) 62.5 MCG/INH inhaler Inhale 1 puff into the lungs daily. (Patient taking differently: Inhale 1 puff into the lungs every morning.) 30 each 11   • atorvastatin (LIPITOR) 40 MG tablet Take 40 mg by mouth every morning.     • carvedilol (COREG) 12.5 MG tablet Take 12.5 mg by mouth every 12 hours. Can not remember to take twice daily.     • citalopram (CeleXA) 20 MG tablet Take 20 mg by mouth every morning.     • fluticasone (FLONASE) 50 MCG/ACT nasal spray Spray 1 spray in each nostril as needed.     • furosemide (LASIX) 20 MG tablet Take 40 mg by mouth every morning.     • levothyroxine 125 MCG tablet Take 1 tablet by mouth every morning.     • omeprazole (PriLOSEC) 40 MG capsule Take 40 mg by mouth as needed.     • potassium chloride (KLOR-CON M) 10 MEQ pita ER tablet Take 1 tablet by mouth every morning.     • ramipril (ALTACE) 5 MG  capsule Take 5 mg by mouth every morning.     • rivaroxaban (XARELTO) 20 MG Tab Take 1 tablet by mouth daily. Indications: Atrial Fibrillation with Blood Vessel Occlusion Process Last dose 10/11/2022     • tiZANidine (ZANAFLEX) 2 MG tablet Take 2 mg by mouth as needed.     • traMADol (ULTRAM) 50 MG tablet Take 50 mg by mouth every 8 hours as needed.     • sildenafil (VIAGRA) 100 MG tablet Take 100 mg by mouth every 24 hours.       No current facility-administered medications for this visit.        ASSESSMENT/PLAN:   Chronic diastolic heart failure  EF of 55%. NYHA II. Patient presenting for post-hospital follow up. Patient breathing has improved. Weight is down ~10 pounds from last OV. Patient denies CP, dizziness, increased SOB, or palpitations. Patient appears euvolemic on exam. Patient had updated labs done today per cardiology. Patient is scheduled for repeat echocardiogram tomorrow.  -educated on the importance of recognizing symptoms of heart failure, daily weights, low sodium diet and exercise   -continue daily weights and call with weight gain as directed- 3 lbs overnight or 5 lbs in a week  -continue sodium restriction 2000 mg daily or less, reviewed sodium content pamphlets and nutrition label   -continue fluid restriction of 64 oz or less daily  -follow up with Dr. Mendenhall next month  -follow up in clinic as needed    HTN  BP controlled on exam.  On carvedilol and ramipril    Hyperlipidemia:  On statin, denies myalgias  -Followed by primary cardiology    AFIB  On xarelto for AC, on carvedilol for RC  -Followed by primary cardiology       For this visit I performed pre-charting, chart review, and documenting. Allergies and Medications were reviewed with the patient and updated during today's visit. In addition, I discussed medication dosage, usage, goals of therapy, and side effects with patient. Medication reconciliation was done but medications not prescribed by me may be inaccurate. The patient's  previous laboratory and cardiac diagnostic testing listed above has been reviewed and analyzed to establish my current plan of care. Previous outside notes were reviewed and taken into consideration when deciding further treatment.      Rae MEDINA, KVNG-C, CHFN  [facility]   Heart Failure [facility]    none

## 2022-10-28 NOTE — ED ADULT NURSE NOTE - URINE COLOR
dark as per pt Detail Level: Generalized Initiate Treatment: Clindamycin, Azelaic acid, Sulfa cleanse

## 2022-11-05 NOTE — DISCHARGE NOTE ADULT - PHYSICIAN SECTION COMPLETE
Chief Complaint   Patient presents with     Post-op Visit     History of adenoidectomy and BMT 2022- no concerns today     History of Present Illness  Suman Siddiqui is a 5 year old male who presents today for follow-up.  The patient went to the operating room and underwent bilateral myringotomy with tube placement and adenoidectomy on 2022.  The patient was seen for follow-up on 2022 with ear tubes in good placement and normal hearing.  He returns for routine ear tube follow-up.      From a symptom standpoint, the family reports 1 episode of ear drainage at the end of December in the left ear successfully treated with drops.  The patient otherwise has not had any problems with otalgia or otorrhea.  No hearing or speech concerns.  The patient is otherwise doing well and has no ENT related concerns.    Past Medical History  Patient Active Problem List   Diagnosis     Metabolic acidemia     Single liveborn infant delivered vaginally     S/P Induced hypothermia      respiratory failure     Pseudostrabismus     Personal history of  problems     Developmental delay     History of ear infections     Bilateral impacted cerumen     Injury of ear canal, sequela     Current Medications    Current Outpatient Medications:      acetaminophen (TYLENOL) 160 MG/5ML suspension, Take 9.1 mLs (291.2 mg) by mouth every 6 hours as needed for fever or pain (Patient not taking: Reported on 2022), Disp: 240 mL, Rfl: 1     ibuprofen (ADVIL/MOTRIN) 100 MG/5ML suspension, Take 10 mLs (200 mg) by mouth every 6 hours as needed for fever or pain (Patient not taking: Reported on 2022), Disp: 240 mL, Rfl: 1     ofloxacin (OCUFLOX) 0.3 % ophthalmic solution, Post-op: No drops needed post op. Drainage: Place 5 drops in draining ear twice daily for 10 days.  Call if drainage persistent past 10 days. (Patient not taking: Reported on 2022), Disp: 10 mL, Rfl: 3    Allergies  No Known Allergies    Social  History  Social History     Socioeconomic History     Marital status: Single   Tobacco Use     Smoking status: Never     Smokeless tobacco: Never   Substance and Sexual Activity     Alcohol use: Never     Drug use: Never     Sexual activity: Never     Social Determinants of Health     Food Insecurity: No Food Insecurity     Worried About Running Out of Food in the Last Year: Never true     Ran Out of Food in the Last Year: Never true   Transportation Needs: Unknown     Lack of Transportation (Medical): No   Physical Activity: Insufficiently Active     Days of Exercise per Week: 5 days     Minutes of Exercise per Session: 20 min   Housing Stability: Unknown     Unable to Pay for Housing in the Last Year: No     Unstable Housing in the Last Year: No       Family History  Family History   Problem Relation Age of Onset     Anxiety Disorder Mother      Depression Mother      Obesity Mother      Obesity Father      Testicular cancer Father      Mental Illness Maternal Grandmother         bi-polar     Gastrointestinal Disease Maternal Grandmother         diverticulitis     Diabetes Maternal Grandmother      Coronary Artery Disease Maternal Grandmother      Hypertension Maternal Grandmother      Hyperlipidemia Maternal Grandmother      Depression Maternal Grandmother      Anxiety Disorder Maternal Grandmother      Thyroid Disease Maternal Grandmother      Obesity Maternal Grandmother      Other Cancer Maternal Grandfather 52        non hodgkins      Mental Illness Paternal Grandmother      Coronary Artery Disease Paternal Grandfather        Review of Systems  As per HPI and PMHx, otherwise 10 system review including the head and neck, constitutional, eyes, respiratory, GI, skin, neurologic, lymphatic, endocrine, and allergy systems is negative.    Physical Exam  Pulse 88   Temp 98  F (36.7  C) (Tympanic)   Wt 22.2 kg (49 lb)   GENERAL: Patient is a pleasant, cooperative 5 year old male in no acute distress.  HEAD:  Normocephalic, atraumatic.  Hair and scalp are normal.  EYES: Pupils are equal, round, reactive to light and accommodation.  Extraocular movements are intact.  The sclera nonicteric without injection.  The extraocular structures are normal.  EARS: Normal shape and symmetry.  No tenderness when palpating the mastoid or tragal areas bilaterally.  Otoscopic exam reveals clear canals bilaterally.  There are bilateral Dura-Vent ear tubes extruded from the eardrums encased in cerumen within the ear canal.  Both tympanic membranes are visible and appear to be intact without evidence of effusion. No granulation or drainage.  NOSE: Nares are patent.  Nasal mucosa is pink and moist.  Negative anterior rhinoscopy.  NEUROLOGIC: Cranial nerves II through XII are grossly intact.  Voice is strong.  Patient is House-Brackman I/VI bilaterally.  CARDIOVASCULAR: Extremities are warm and well-perfused.  No significant peripheral edema.  RESPIRATORY: Patient has nonlabored breathing without cough, wheeze, stridor.  PSYCHIATRIC: Patient is alert and oriented.  Mood and affect appear normal.  SKIN: Warm and dry.  No scalp, face, or neck lesions noted.    Assessment and Plan    ICD-10-CM    1. Chronic otitis media with effusion, bilateral  H65.493       2. History of acute otitis media  Z86.69       3. Non-functioning tympanostomy tube, initial encounter  T85.655I          It was my pleasure seeing Suman and their family today in clinic.  Both ear tubes appear to be extruded and the eardrums have healed.  The patient is very reluctant to have me look in his ears to remove the ear tubes today.  I think we can observe him at this time, see him back in 3 months with a hearing test.  Hopefully he will let me clean his ear tubes out or they will have extruded from the canal spontaneously. Family knows to contact me with problems or concerns.    The plan was discussed in detail with the patient's family.  Questions were answered the best my  ability.  They voiced understanding agree with the plan.    Lonnie Lawrence MD  Department of Otolaryngology-Head and Neck Surgery  [facility]      Yes

## 2023-01-07 NOTE — ASU PREOP CHECKLIST - SITE MARKED BY SURGEON
n/a
Pt presents A&OX3 c/o generalized weakness and subjective fever X 2 days. PT denies medical hx, denies nvd. Reports intermittent body aches. PT skin color appropriate for race, respirations even and unlabored. ED workup in progress, will continue to monitor. Oswaldo Giang RN

## 2023-02-28 ENCOUNTER — APPOINTMENT (OUTPATIENT)
Dept: ORTHOPEDIC SURGERY | Facility: CLINIC | Age: 78
End: 2023-02-28

## 2023-03-07 ENCOUNTER — APPOINTMENT (OUTPATIENT)
Dept: ORTHOPEDIC SURGERY | Facility: CLINIC | Age: 78
End: 2023-03-07
Payer: MEDICARE

## 2023-03-07 VITALS — HEART RATE: 56 BPM | DIASTOLIC BLOOD PRESSURE: 84 MMHG | SYSTOLIC BLOOD PRESSURE: 148 MMHG

## 2023-03-07 DIAGNOSIS — M25.511 PAIN IN RIGHT SHOULDER: ICD-10-CM

## 2023-03-07 DIAGNOSIS — M25.512 PAIN IN RIGHT SHOULDER: ICD-10-CM

## 2023-03-07 PROCEDURE — 20611 DRAIN/INJ JOINT/BURSA W/US: CPT | Mod: RT

## 2023-03-07 PROCEDURE — 99215 OFFICE O/P EST HI 40 MIN: CPT | Mod: 25

## 2023-03-07 PROCEDURE — 73030 X-RAY EXAM OF SHOULDER: CPT | Mod: 50

## 2023-03-07 RX ORDER — METHYLPRED ACET/NACL,ISO-OS/PF 40 MG/ML
40 VIAL (ML) INJECTION
Qty: 1 | Refills: 0 | Status: COMPLETED | OUTPATIENT
Start: 2023-03-07

## 2023-03-07 RX ORDER — LIDOCAINE HYDROCHLORIDE 10 MG/ML
1 INJECTION, SOLUTION INFILTRATION; PERINEURAL
Refills: 0 | Status: COMPLETED | OUTPATIENT
Start: 2023-03-07

## 2023-03-07 RX ORDER — MELOXICAM 15 MG/1
15 TABLET ORAL
Qty: 30 | Refills: 0 | Status: ACTIVE | COMMUNITY
Start: 2023-03-07 | End: 1900-01-01

## 2023-03-07 RX ADMIN — LIDOCAINE HYDROCHLORIDE 3 %: 10 INJECTION, SOLUTION INFILTRATION; PERINEURAL at 00:00

## 2023-03-07 RX ADMIN — METHYLPREDNISOLONE ACETATE 2 MG/ML: 40 INJECTION, SUSPENSION INTRA-ARTICULAR; INTRALESIONAL; INTRAMUSCULAR; INTRASYNOVIAL; SOFT TISSUE at 00:00

## 2023-03-22 ENCOUNTER — APPOINTMENT (OUTPATIENT)
Dept: ORTHOPEDIC SURGERY | Facility: CLINIC | Age: 78
End: 2023-03-22
Payer: MEDICARE

## 2023-03-22 VITALS — SYSTOLIC BLOOD PRESSURE: 149 MMHG | HEART RATE: 54 BPM | DIASTOLIC BLOOD PRESSURE: 81 MMHG

## 2023-03-22 PROCEDURE — 99214 OFFICE O/P EST MOD 30 MIN: CPT | Mod: 25

## 2023-03-22 PROCEDURE — 20611 DRAIN/INJ JOINT/BURSA W/US: CPT | Mod: LT

## 2023-03-22 RX ORDER — METHYLPRED ACET/NACL,ISO-OS/PF 40 MG/ML
40 VIAL (ML) INJECTION
Qty: 1 | Refills: 0 | Status: COMPLETED | OUTPATIENT
Start: 2023-03-22

## 2023-03-22 RX ORDER — LIDOCAINE HYDROCHLORIDE 10 MG/ML
1 INJECTION, SOLUTION INFILTRATION; PERINEURAL
Refills: 0 | Status: COMPLETED | OUTPATIENT
Start: 2023-03-22

## 2023-03-22 RX ADMIN — Medication %: at 00:00

## 2023-03-22 RX ADMIN — METHYLPREDNISOLONE ACETATE MG/ML: 40 INJECTION, SUSPENSION INTRA-ARTICULAR; INTRALESIONAL; INTRAMUSCULAR; SOFT TISSUE at 00:00

## 2023-04-11 ENCOUNTER — APPOINTMENT (OUTPATIENT)
Dept: OBGYN | Facility: CLINIC | Age: 78
End: 2023-04-11
Payer: MEDICARE

## 2023-04-11 VITALS
SYSTOLIC BLOOD PRESSURE: 155 MMHG | WEIGHT: 140 LBS | HEART RATE: 59 BPM | DIASTOLIC BLOOD PRESSURE: 77 MMHG | HEIGHT: 58 IN | BODY MASS INDEX: 29.39 KG/M2

## 2023-04-11 DIAGNOSIS — D21.9 BENIGN NEOPLASM OF CONNECTIVE AND OTHER SOFT TISSUE, UNSPECIFIED: ICD-10-CM

## 2023-04-11 DIAGNOSIS — Z01.419 ENCOUNTER FOR GYNECOLOGICAL EXAMINATION (GENERAL) (ROUTINE) W/OUT ABNORMAL FINDINGS: ICD-10-CM

## 2023-04-11 DIAGNOSIS — Z13.820 ENCOUNTER FOR SCREENING FOR OSTEOPOROSIS: ICD-10-CM

## 2023-04-11 DIAGNOSIS — Z12.39 ENCOUNTER FOR OTHER SCREENING FOR MALIGNANT NEOPLASM OF BREAST: ICD-10-CM

## 2023-04-11 PROCEDURE — 99213 OFFICE O/P EST LOW 20 MIN: CPT | Mod: 25

## 2023-04-11 PROCEDURE — 99387 INIT PM E/M NEW PAT 65+ YRS: CPT

## 2023-04-11 RX ORDER — CIPROFLOXACIN HYDROCHLORIDE 250 MG/1
250 TABLET, FILM COATED ORAL
Qty: 14 | Refills: 0 | Status: DISCONTINUED | COMMUNITY
Start: 2021-11-23 | End: 2023-04-11

## 2023-04-11 RX ORDER — FLUTICASONE PROPIONATE AND SALMETEROL 250; 50 UG/1; UG/1
250-50 POWDER RESPIRATORY (INHALATION)
Qty: 180 | Refills: 0 | Status: DISCONTINUED | COMMUNITY
Start: 2020-06-25 | End: 2023-04-11

## 2023-04-11 NOTE — PHYSICAL EXAM
[Chaperone Present] : A chaperone was present in the examining room during all aspects of the physical examination [Appropriately responsive] : appropriately responsive [Alert] : alert [No Acute Distress] : no acute distress [No Lymphadenopathy] : no lymphadenopathy [Soft] : soft [Non-tender] : non-tender [Non-distended] : non-distended [No HSM] : No HSM [No Lesions] : no lesions [No Mass] : no mass [Oriented x3] : oriented x3 [Examination Of The Breasts] : a normal appearance [No Masses] : no breast masses were palpable [Labia Majora] : normal [Labia Minora] : normal [Normal] : normal [Uterine Adnexae] : non-palpable

## 2023-04-12 LAB — HPV HIGH+LOW RISK DNA PNL CVX: NOT DETECTED

## 2023-04-16 LAB — CYTOLOGY CVX/VAG DOC THIN PREP: ABNORMAL

## 2023-05-09 ENCOUNTER — APPOINTMENT (OUTPATIENT)
Dept: ORTHOPEDIC SURGERY | Facility: CLINIC | Age: 78
End: 2023-05-09

## 2023-07-19 ENCOUNTER — APPOINTMENT (OUTPATIENT)
Dept: ORTHOPEDIC SURGERY | Facility: CLINIC | Age: 78
End: 2023-07-19
Payer: MEDICARE

## 2023-07-19 VITALS — HEART RATE: 54 BPM | SYSTOLIC BLOOD PRESSURE: 129 MMHG | DIASTOLIC BLOOD PRESSURE: 75 MMHG

## 2023-07-19 PROCEDURE — 99214 OFFICE O/P EST MOD 30 MIN: CPT | Mod: 25

## 2023-07-19 PROCEDURE — 20611 DRAIN/INJ JOINT/BURSA W/US: CPT | Mod: RT

## 2023-07-19 RX ORDER — METHYLPRED ACET/NACL,ISO-OS/PF 40 MG/ML
40 VIAL (ML) INJECTION
Qty: 1 | Refills: 0 | Status: COMPLETED | OUTPATIENT
Start: 2023-07-19

## 2023-07-19 RX ORDER — LIDOCAINE HYDROCHLORIDE 5 MG/ML
0.5 INJECTION, SOLUTION INFILTRATION; PERINEURAL
Refills: 0 | Status: COMPLETED | OUTPATIENT
Start: 2023-07-19

## 2023-07-19 RX ADMIN — METHYLPREDNISOLONE ACETATE 1 MG/ML: 40 INJECTION, SUSPENSION INTRA-ARTICULAR; INTRALESIONAL; INTRAMUSCULAR; SOFT TISSUE at 00:00

## 2023-07-19 RX ADMIN — LIDOCAINE HYDROCHLORIDE 3 %: 5 INJECTION, SOLUTION INFILTRATION; PERINEURAL at 00:00

## 2023-07-19 NOTE — PROCEDURE
[de-identified] : Using sterile technique, 2cc of depomedrol (40mg/ml) and 3cc of 1% plain lidocaine was drawn up into a sterile 5cc syringe.  The posterior lateral corner of the right shoulder was then sterilely prepped with chlorhexidine and ethylene chloride spray was used as an anesthetic prior to injection.  The subacromial space was imaged with the Hic[employer] Lumify ultrasound probe.  The depomedrol/lidocaine mixture was injected into the subacromial space under ultrasound guidance.  A spot image of the injection was saved..  The patient tolerated the procedure well without difficulty.  The patient was given instructions on the use of ice and anti-inflammatories post injection site soreness.

## 2023-07-19 NOTE — REASON FOR VISIT
[Follow-Up Visit] : a follow-up visit for [FreeTextEntry2] : Right rotator cuff arthropathy and tear of left rotator cuff

## 2023-07-19 NOTE — HISTORY OF PRESENT ILLNESS
[Worsening] : worsening [10] : a current pain level of 10/10 [NSAIDs] : relieved by nonsteroidal anti-inflammatory drugs [de-identified] : This patient presents today for follow-up regarding right shoulder rotator cuff arthropathy and left shoulder rotator cuff tear.  Pain level is worsening especially in the right shoulder.  Pain level 10 out of 10 and constant.  Pain is worse with activity such as reaching pulling pushing and lifting.  Pain worse when she lays on the side as well.  Pain is improved with rest.  She is also complaining of some pain about the left paraspinal area as she been using the left shoulder quite a bit more that the right one is bothering her now.  On last office visit we injected the left shoulder with Depo-Medrol lidocaine.  She presents today for follow-up and reevaluation regarding worsening pain right worse than the left.  Takes Motrin or naproxen intermittently.

## 2023-07-19 NOTE — DISCUSSION/SUMMARY
[de-identified] : This patient presents today for follow-up regarding rotator cuff tear arthropathy right shoulder and left shoulder rotator cuff tear.  Patient is having recurrence of symptoms especially the right shoulder.  Physical exam reveals evidence of rotator cuff tear arthropathy right shoulder.  I discussed the diagnosis and treatment recommendations.  Due to patient's worsening recurrent pain about the right shoulder I recommended performed a steroid injection to the right shoulder.  Instructions are given postinjection for ice analgesics and modification of activities.  I will see the patient back in the office if she has recurrence of symptoms.  If the left shoulder worsens I would recommend a follow-up injection into the left shoulder as well.\par \par At least 30 minutes was spent performing the evaluation and management on today's office visit.  This includes but is not limited to preparing to see patient including review of any test results or outside medical records, obtaining and/or reviewing separately obtained history, performing examination and evaluation, counseling and educating the patient on their diagnosis and treatment recommendations, ordering medications, tests, or procedures, documenting clinical information in the electronic health record, independently interpreting results (not separately reported) and communicating results to the patient, and coordination of care.

## 2023-07-19 NOTE — PHYSICAL EXAM
[de-identified] : The patient appears well nourished  and in no apparent distress.  The patient is alert and oriented to person, place, and time.   Affect and mood appear normal.    The head is normocephalic and atraumatic.  The eyes reveal normal sclera and extra ocular muscles are intact.   The neck appears normal with no jugular venous distention or masses noted.   Skin shows normal turgor with no evidence of eczema or psoriasis.  No respiratory distress noted.  The patient ambulates with a normal gait.\par \par The right and left shoulders have moderate loss of range of motion.  There is weakness of the rotator cuff.  There is pain with terminal range of motion.  There is tenderness about the glenohumeral joint anteriorly..  There is weakness about the rotator cuff.    There is a positive impingement sign and a positive Bhatt sign.  There is no soft tissue swelling.  There is no tenderness to palpation. There is no eccyhmosis.  There is no erythema or warmth.     There is no instability  No lymphadenopathy or edema is noted.  Pulses and capillary refill are normal.  Sensation is normal.    \par \par

## 2023-08-01 ENCOUNTER — APPOINTMENT (OUTPATIENT)
Dept: ORTHOPEDIC SURGERY | Facility: CLINIC | Age: 78
End: 2023-08-01

## 2023-08-02 ENCOUNTER — APPOINTMENT (OUTPATIENT)
Dept: ORTHOPEDIC SURGERY | Facility: CLINIC | Age: 78
End: 2023-08-02
Payer: MEDICARE

## 2023-08-02 VITALS — DIASTOLIC BLOOD PRESSURE: 77 MMHG | HEART RATE: 52 BPM | SYSTOLIC BLOOD PRESSURE: 147 MMHG

## 2023-08-02 PROCEDURE — 99214 OFFICE O/P EST MOD 30 MIN: CPT | Mod: 25

## 2023-08-02 PROCEDURE — 20611 DRAIN/INJ JOINT/BURSA W/US: CPT | Mod: LT

## 2023-08-02 RX ORDER — METHYLPRED ACET/NACL,ISO-OS/PF 40 MG/ML
40 VIAL (ML) INJECTION
Qty: 1 | Refills: 0 | Status: COMPLETED | OUTPATIENT
Start: 2023-08-02

## 2023-08-02 RX ORDER — LIDOCAINE HYDROCHLORIDE 10 MG/ML
1 INJECTION, SOLUTION INFILTRATION; PERINEURAL
Refills: 0 | Status: COMPLETED | OUTPATIENT
Start: 2023-08-02

## 2023-08-02 RX ADMIN — METHYLPREDNISOLONE ACETATE MG/ML: 40 INJECTION, SUSPENSION INTRA-ARTICULAR; INTRALESIONAL; INTRAMUSCULAR; SOFT TISSUE at 00:00

## 2023-08-02 RX ADMIN — Medication %: at 00:00

## 2023-08-04 ENCOUNTER — EMERGENCY (EMERGENCY)
Facility: HOSPITAL | Age: 78
LOS: 1 days | Discharge: HOME | End: 2023-08-04
Attending: EMERGENCY MEDICINE | Admitting: EMERGENCY MEDICINE
Payer: MEDICARE

## 2023-08-04 VITALS
WEIGHT: 130.07 LBS | RESPIRATION RATE: 18 BRPM | TEMPERATURE: 98 F | SYSTOLIC BLOOD PRESSURE: 130 MMHG | HEART RATE: 56 BPM | DIASTOLIC BLOOD PRESSURE: 70 MMHG | OXYGEN SATURATION: 99 % | HEIGHT: 58 IN

## 2023-08-04 DIAGNOSIS — Z98.890 OTHER SPECIFIED POSTPROCEDURAL STATES: Chronic | ICD-10-CM

## 2023-08-04 DIAGNOSIS — Z90.89 ACQUIRED ABSENCE OF OTHER ORGANS: Chronic | ICD-10-CM

## 2023-08-04 DIAGNOSIS — Z90.12 ACQUIRED ABSENCE OF LEFT BREAST AND NIPPLE: Chronic | ICD-10-CM

## 2023-08-04 DIAGNOSIS — Z87.19 PERSONAL HISTORY OF OTHER DISEASES OF THE DIGESTIVE SYSTEM: Chronic | ICD-10-CM

## 2023-08-04 DIAGNOSIS — Z90.49 ACQUIRED ABSENCE OF OTHER SPECIFIED PARTS OF DIGESTIVE TRACT: Chronic | ICD-10-CM

## 2023-08-04 DIAGNOSIS — Z96.653 PRESENCE OF ARTIFICIAL KNEE JOINT, BILATERAL: Chronic | ICD-10-CM

## 2023-08-04 PROCEDURE — 99284 EMERGENCY DEPT VISIT MOD MDM: CPT

## 2023-08-04 PROCEDURE — 73030 X-RAY EXAM OF SHOULDER: CPT | Mod: 26,RT

## 2023-08-04 PROCEDURE — 90715 TDAP VACCINE 7 YRS/> IM: CPT

## 2023-08-04 PROCEDURE — 73030 X-RAY EXAM OF SHOULDER: CPT

## 2023-08-04 PROCEDURE — 99284 EMERGENCY DEPT VISIT MOD MDM: CPT | Mod: 25

## 2023-08-04 PROCEDURE — 90471 IMMUNIZATION ADMIN: CPT

## 2023-08-04 PROCEDURE — 72100 X-RAY EXAM L-S SPINE 2/3 VWS: CPT | Mod: 26

## 2023-08-04 PROCEDURE — 73060 X-RAY EXAM OF HUMERUS: CPT | Mod: 26,RT

## 2023-08-04 PROCEDURE — 73562 X-RAY EXAM OF KNEE 3: CPT | Mod: 26,RT

## 2023-08-04 PROCEDURE — 73562 X-RAY EXAM OF KNEE 3: CPT

## 2023-08-04 PROCEDURE — 73060 X-RAY EXAM OF HUMERUS: CPT

## 2023-08-04 PROCEDURE — 72100 X-RAY EXAM L-S SPINE 2/3 VWS: CPT

## 2023-08-04 PROCEDURE — 70450 CT HEAD/BRAIN W/O DYE: CPT | Mod: MA

## 2023-08-04 PROCEDURE — 72125 CT NECK SPINE W/O DYE: CPT | Mod: 26,MA

## 2023-08-04 PROCEDURE — 72125 CT NECK SPINE W/O DYE: CPT | Mod: MA

## 2023-08-04 PROCEDURE — 70450 CT HEAD/BRAIN W/O DYE: CPT | Mod: 26,MA

## 2023-08-04 RX ORDER — TETANUS TOXOID, REDUCED DIPHTHERIA TOXOID AND ACELLULAR PERTUSSIS VACCINE, ADSORBED 5; 2.5; 8; 8; 2.5 [IU]/.5ML; [IU]/.5ML; UG/.5ML; UG/.5ML; UG/.5ML
0.5 SUSPENSION INTRAMUSCULAR ONCE
Refills: 0 | Status: COMPLETED | OUTPATIENT
Start: 2023-08-04 | End: 2023-08-04

## 2023-08-04 RX ORDER — IBUPROFEN 200 MG
600 TABLET ORAL ONCE
Refills: 0 | Status: COMPLETED | OUTPATIENT
Start: 2023-08-04 | End: 2023-08-04

## 2023-08-04 RX ADMIN — Medication 600 MILLIGRAM(S): at 12:13

## 2023-08-04 RX ADMIN — TETANUS TOXOID, REDUCED DIPHTHERIA TOXOID AND ACELLULAR PERTUSSIS VACCINE, ADSORBED 0.5 MILLILITER(S): 5; 2.5; 8; 8; 2.5 SUSPENSION INTRAMUSCULAR at 12:13

## 2023-08-04 NOTE — ED PROVIDER NOTE - CARDIAC, MLM
Normal rate, regular rhythm.  Heart sounds S1, S2.  No murmurs, rubs or gallops. Normal rate, regular rhythm.  Heart sounds S1, S2.  No chest wall TTP

## 2023-08-04 NOTE — ED ADULT NURSE NOTE - NEURO MENTATION
ANTICOAGULATION FOLLOW-UP CLINIC VISIT    Patient Name:  Barry Herrera  Date:  2/3/2017  Contact Type:  Face to Face    SUBJECTIVE:     Patient Findings     Positives No Problem Findings           OBJECTIVE    INR PROTIME   Date Value Ref Range Status   02/03/2017 2.5* 0.86 - 1.14 Final       ASSESSMENT / PLAN  INR assessment THER    Recheck INR In: 6 WEEKS    INR Location Clinic      Anticoagulation Summary as of 2/3/2017     INR goal 2.0-3.0   Selected INR 2.5 (2/3/2017)   Maintenance plan 10 mg (5 mg x 2) on Mon; 7.5 mg (5 mg x 1.5) all other days   Full instructions 10 mg on Mon; 7.5 mg all other days   Weekly total 55 mg   No change documented Lois Claudio RN   Plan last modified Lois Claudio RN (9/7/2016)   Next INR check 3/17/2017   Target end date     Indications   Long-term (current) use of anticoagulants [Z79.01] [Z79.01]  DVT (deep venous thrombosis) (H) (Resolved) [I82.409]         Anticoagulation Episode Summary     INR check location [facility]    Preferred lab     Send INR reminders to [facility]    Comments       Anticoagulation Care Providers     Provider Role Specialty Phone number    Josette Sarmiento MD  [facility] [phone]            See the Encounter Report to view Anticoagulation Flowsheet and Dosing Calendar (Go to Encounters tab in chart review, and find the Anticoagulation Therapy Visit)    2.5 today.  Continue with 10 mg on Mon;  7.5 mg all other days.  Recheck in 6 weeks.   Dosage adjustment made based on physician directed care plan.    Lois Claudio RN                 
normal

## 2023-08-04 NOTE — ED PROVIDER NOTE - CARE PROVIDERS DIRECT ADDRESSES
[email or web address]

## 2023-08-04 NOTE — ED PROVIDER NOTE - DIFFERENTIAL DIAGNOSIS
Patient presenting to the emergency room with pain status post mechanical fall.  No LOC but unclear if there was head injury.  Will obtain CT imaging of the head and cervical spine to exclude underlying pathology.  For back shoulder and knee pain will obtain x-ray imaging to exclude bony pathology.  Medicate as needed for pain and monitor.  Patient remains neurovascular intact. Differential Diagnosis Patient presenting to the emergency room with pain status post mechanical fall.  No LOC but unclear if there was head injury.  Will obtain CT imaging of the head and cervical spine to exclude underlying pathology.  For back shoulder and knee pain will obtain x-ray imaging to exclude bony pathology.  Medicate as needed for pain and monitor.  Patient remains neurovascular intact. Unsure of date of last Tetanus will update

## 2023-08-04 NOTE — ED PROVIDER NOTE - NSFOLLOWUPINSTRUCTIONS_ED_ALL_ED_FT
Return to the ED for any new or worsening symptoms  Take your medication as prescribed  Arm sling for comfort   Follow up with your orthopedist   Advance activity as tolerated      Shoulder Pain  Many things can cause shoulder pain, including:  An injury to the shoulder.  Overuse of the shoulder.  Arthritis.  The source of the pain can be:  Inflammation.  An injury to the shoulder joint.  An injury to a tendon, ligament, or bone.  Follow these instructions at home:  Pay attention to changes in your symptoms. Let your health care provider know about them. Follow these instructions to relieve your pain.    If you have a sling:    Wear the sling as told by your health care provider. Remove it only as told by your health care provider.  Loosen the sling if your fingers tingle, become numb, or turn cold and blue.  Keep the sling clean.  If the sling is not waterproof:  Do not let it get wet. Remove it to shower or bathe.  Move your arm as little as possible, but keep your hand moving to prevent swelling.  Managing pain, stiffness, and swelling    Bag of ice on a towel on the skin.  If directed, put ice on the painful area:  Put ice in a plastic bag.  Place a towel between your skin and the bag.  Leave the ice on for 20 minutes, 2–3 times per day. Stop applying ice if it does not help with the pain.  Squeeze a soft ball or a foam pad as much as possible. This helps to keep the shoulder from swelling. It also helps to strengthen the arm.  General instructions    Take over-the-counter and prescription medicines only as told by your health care provider.  Keep all follow-up visits as told by your health care provider. This is important.  Contact a health care provider if:  Your pain gets worse.  Your pain is not relieved with medicines.  New pain develops in your arm, hand, or fingers.  Get help right away if:  Your arm, hand, or fingers:  Tingle.  Become numb.  Become swollen.  Become painful.  Turn white or blue.  Summary  Shoulder pain can be caused by an injury, overuse, or arthritis.  Pay attention to changes in your symptoms. Let your health care provider know about them.  This condition may be treated with a sling, ice, and pain medicines.  Contact your health care provider if the pain gets worse or new pain develops. Get help right away if your arm, hand, or fingers tingle or become numb, swollen, or painful.  Keep all follow-up visits as told by your health care provider. This is important.  This information is not intended to replace advice given to you by your health care provider. Make sure you discuss any questions you have with your health care provider.

## 2023-08-04 NOTE — ED PROVIDER NOTE - OBJECTIVE STATEMENT
Patient is a 78-year-old female presents to the emergency room with right shoulder pain status post mechanical fall.  Past medical history of osteoarthritis history of breast cancer migraines GERD hypertension hyperlipidemia seasonal allergies.  Patient reports that she was ambulating into pharmacy when she tripped on uneven pavement fell landing on her shoulder seeing an abrasion to the elbow fifth digit and injuring her right knee.  She has been up and ambulating since reports that she is having difficulty ranging her right shoulder secondary to pain.  Patient endorses that she has been following with orthopedics outpatient for chronic right shoulder pain and recently had an injection.  Spoke with the orthopedic office and was instructed to come to the emergency room for further workup and management.  Denies striking her head denies LOC.  Denies any extremity numbness or weakness.  Denies chest pain shortness of breath abdominal pain.  Denies any neck pain although does note some low back discomfort. Patient is a 78-year-old female presents to the emergency room with right shoulder pain status post mechanical fall.  Past medical history of osteoarthritis history of breast cancer migraines GERD hypertension hyperlipidemia seasonal allergies.  Patient reports that she was ambulating into pharmacy when she tripped on uneven pavement fell landing on her right shoulder sustaining an abrasion to the right elbow fifth digit and injuring her right knee.  She has been up and ambulating since. Pt reports continued severe pain to the right shoulder with limited ROM secondary to pain.    Patient endorses that she has been following with orthopedics outpatient for chronic right shoulder pain/ rotator cuff tear and recently had an injection.  Spoke with the orthopedic office and was instructed to come to the emergency room for further workup and management.  Unsure if there was head injury but denies LOC.  Denies any extremity numbness.  Denies chest pain shortness of breath abdominal pain.  Denies any neck pain although does note some low back discomfort. No loss of bowel or bladder control.

## 2023-08-04 NOTE — ED PROVIDER NOTE - PATIENT PORTAL LINK FT
You can access the FollowMyHealth Patient Portal offered by [facility] by registering at the following website: [email or web address]. By joining [employer]’s FollowMyHealth portal, you will also be able to view your health information using other applications (apps) compatible with our system.

## 2023-08-04 NOTE — ED ADULT NURSE NOTE - CAS DISC DELAYS
S/p R ovary removed   Ordered US Pelvis for further evaluation given similar symptoms and L ovary/uterus intact   Has GYN appointment upcoming    Waiting private transportation

## 2023-08-04 NOTE — ED PROVIDER NOTE - CLINICAL SUMMARY MEDICAL DECISION MAKING FREE TEXT BOX
Results of labs reviewed there is no intracranial hemorrhage no evidence for cervical fracture.  Independent review of x-ray reveals no humerus fracture degenerative changes of the lumbar spine anterograde slippage of L4 on L5.  No acute fracture of the right knee no acute fracture of the right shoulder.  Patient still with limited range of motion of the right shoulder.  Will place in sling for comfort.  Attempts to reach her orthopedist to no avail yet requesting discharge. Patient is a 78-year-old female presents to the emergency room with right shoulder pain status post mechanical fall.  Past medical history of osteoarthritis history of breast cancer migraines GERD hypertension hyperlipidemia seasonal allergies.  Patient reports that she was ambulating into pharmacy when she tripped on uneven pavement fell landing on her shoulder seeing an abrasion to the elbow fifth digit and injuring her right knee.  She has been up and ambulating since reports that she is having difficulty ranging her right shoulder secondary to pain.  Patient endorses that she has been following with orthopedics outpatient for chronic right shoulder pain and recently had an injection.  Spoke with the orthopedic office and was instructed to come to the emergency room for further workup and management.  Denies striking her head denies LOC.  Denies any extremity numbness or weakness.  Denies chest pain shortness of breath abdominal pain.  Denies any neck pain although does note some low back discomfort.      Results of labs reviewed there is no intracranial hemorrhage no evidence for cervical fracture.  Independent review of x-ray reveals no humerus fracture degenerative changes of the lumbar spine anterograde slippage of L4 on L5.  No acute fracture of the right knee no acute fracture of the right shoulder.  Patient still with limited range of motion of the right shoulder.  Will place in sling for comfort.  Attempts to reach her orthopedist to no avail yet requesting discharge. Patient is a 78-year-old female presents to the emergency room with right shoulder pain status post mechanical fall.  Past medical history of osteoarthritis history of breast cancer migraines GERD hypertension hyperlipidemia seasonal allergies.  Patient reports that she was ambulating into pharmacy when she tripped on uneven pavement fell landing on her right shoulder sustaining an abrasion to the right elbow fifth digit and injuring her right knee.  She has been up and ambulating since. Pt reports continued severe pain to the right shoulder with limited ROM secondary to pain.    Patient endorses that she has been following with orthopedics outpatient for chronic right shoulder pain/ rotator cuff tear and recently had an injection.  Spoke with the orthopedic office and was instructed to come to the emergency room for further workup and management.  Unsure if there was head injury but denies LOC.  Denies any extremity numbness.  Denies chest pain shortness of breath abdominal pain.  Denies any neck pain although does note some low back discomfort. No loss of bowel or bladder control.  Patient presenting to the emergency room with pain status post mechanical fall.  No LOC but unclear if there was head injury.  Will obtain CT imaging of the head and cervical spine to exclude underlying pathology.  For back shoulder and knee pain will obtain x-ray imaging to exclude bony pathology.  Medicate as needed for pain and monitor.  Patient remains neurovascular intact. Results of CT imaging reviewed there is no intracranial hemorrhage no evidence for cervical fracture.  Independent review of x-ray reveals no humerus fracture degenerative changes of the lumbar spine anterograde slippage of L4 on L5.  No acute fracture of the right knee no acute fracture of the right shoulder.  Patient still with limited range of motion of the right shoulder.  Will place in sling for comfort.  Attempts to reach her orthopedist to no avail yet requesting discharge. Patient refusing sling. Remains NVI with limited ROM. Does not want to wait to hear back from her orthopedist. Advised that sometimes fractures cannot be seen on initial imaging.  Also advised that x-rays do not exclude underlying rotator cuff pathology or additional ligament or tendon issues.  Prior to patient leaving was able to contact her orthopedist.  No further acute intervention needed patient can follow-up early next week for further workup and management. Patient is a 78-year-old female presents to the emergency room with right shoulder pain status post mechanical fall.  Past medical history of osteoarthritis history of breast cancer migraines GERD hypertension hyperlipidemia seasonal allergies.  Patient reports that she was ambulating into pharmacy when she tripped on uneven pavement fell landing on her right shoulder sustaining an abrasion to the right elbow fifth digit and injuring her right knee.  She has been up and ambulating since. Pt reports continued severe pain to the right shoulder with limited ROM secondary to pain.    Patient endorses that she has been following with orthopedics outpatient for chronic right shoulder pain/ rotator cuff tear and recently had an injection.  Spoke with the orthopedic office and was instructed to come to the emergency room for further workup and management.  Unsure if there was head injury but denies LOC.  Denies any extremity numbness.  Denies chest pain shortness of breath abdominal pain.  Denies any neck pain although does note some low back discomfort. No loss of bowel or bladder control.  Patient presenting to the emergency room with pain status post mechanical fall.  No LOC but unclear if there was head injury.  Will obtain CT imaging of the head and cervical spine to exclude underlying pathology.  For back shoulder and knee pain will obtain x-ray imaging to exclude bony pathology.  Medicate as needed for pain and monitor.  Patient remains neurovascular intact. Unsure of date of last Tetanus will update. Results of CT imaging reviewed there is no intracranial hemorrhage no evidence for cervical fracture.  Independent review of x-ray reveals no humerus fracture degenerative changes of the lumbar spine anterograde slippage of L4 on L5.  No acute fracture of the right knee no acute fracture of the right shoulder.  Patient still with limited range of motion of the right shoulder.  Will place in sling for comfort.  Attempts to reach her orthopedist to no avail yet requesting discharge. Patient refusing sling. Remains NVI with limited ROM. Does not want to wait to hear back from her orthopedist. Advised that sometimes fractures cannot be seen on initial imaging.  Also advised that x-rays do not exclude underlying rotator cuff pathology or additional ligament or tendon issues.  Prior to patient leaving was able to contact her orthopedist.  No further acute intervention needed patient can follow-up early next week for further workup and management.

## 2023-08-04 NOTE — ED PROVIDER NOTE - NSICDXPASTSURGICALHX_GEN_ALL_CORE_FT
PAST SURGICAL HISTORY:  H/O hiatal hernia surgery approx 2002    History of cataract surgery     S/P appendectomy     S/P carpal tunnel release right    S/P lumpectomy, left breast approx 2008    S/P ovarian cystectomy     S/P tonsillectomy     S/p total knee replacement, bilateral 2017

## 2023-08-04 NOTE — ED PROVIDER NOTE - PHYSICAL EXAMINATION
no midline C/T/L TTP. TTP to right paraspinal cervical muscle region, bilateral lumbar paraspinal muscle TTP  No TTP to LUE or LLE NVI x 2  no pelvic TTP  Diffuse TTP to right shoulder with no acute abnormality limited ROM secondary to pain, abrasion noted to right elbow with no betty TTP FROM, no forearm TTP, no wrist TTP, no TTP to hand or fingers abrasion noted to left digit NVI  No TTP to right femur, tib/fib, ankle, foot. Mild TTP to anterior right patellar no significant ligament instability, FROM of knee NVI

## 2023-08-04 NOTE — ED PROVIDER NOTE - NSICDXPASTMEDICALHX_GEN_ALL_CORE_FT
PAST MEDICAL HISTORY:  GERD (gastroesophageal reflux disease)     History of breast cancer     Hypercholesteremia     Hypertension     Migraines     OA (osteoarthritis)     Osteoarthritis of right shoulder     Seasonal allergies

## 2023-08-04 NOTE — ED ADULT TRIAGE NOTE - CHIEF COMPLAINT QUOTE
" I tripped and fell while going into a Pharmacy, my right shoulder/ arm hurts, I scraped my right elbow and right 5th finger, my right knee hurts "

## 2023-08-04 NOTE — ED ADULT NURSE NOTE - NSFALLUNIVINTERV_ED_ALL_ED
Bed/Stretcher in lowest position, wheels locked, appropriate side rails in place/Call bell, personal items and telephone in reach/Instruct patient to call for assistance before getting out of bed/chair/stretcher/Non-slip footwear applied when patient is off stretcher/[place] to call system/Physically safe environment - no spills, clutter or unnecessary equipment/Purposeful proactive rounding/Room/bathroom lighting operational, light cord in reach

## 2023-08-08 ENCOUNTER — APPOINTMENT (OUTPATIENT)
Dept: ORTHOPEDIC SURGERY | Facility: CLINIC | Age: 78
End: 2023-08-08
Payer: MEDICARE

## 2023-08-08 VITALS — HEART RATE: 56 BPM | SYSTOLIC BLOOD PRESSURE: 123 MMHG | DIASTOLIC BLOOD PRESSURE: 77 MMHG

## 2023-08-08 DIAGNOSIS — M75.102 UNSPECIFIED ROTATOR CUFF TEAR OR RUPTURE OF LEFT SHOULDER, NOT SPECIFIED AS TRAUMATIC: ICD-10-CM

## 2023-08-08 DIAGNOSIS — M12.811 OTHER SPECIFIC ARTHROPATHIES, NOT ELSEWHERE CLASSIFIED, RIGHT SHOULDER: ICD-10-CM

## 2023-08-08 DIAGNOSIS — S43.492S OTHER SPRAIN OF LEFT SHOULDER JOINT, SEQUELA: ICD-10-CM

## 2023-08-08 DIAGNOSIS — S43.491S OTHER SPRAIN OF RIGHT SHOULDER JOINT, SEQUELA: ICD-10-CM

## 2023-08-08 PROCEDURE — 99214 OFFICE O/P EST MOD 30 MIN: CPT

## 2023-08-08 PROCEDURE — 73030 X-RAY EXAM OF SHOULDER: CPT | Mod: 50

## 2023-08-10 ENCOUNTER — OUTPATIENT (OUTPATIENT)
Dept: OUTPATIENT SERVICES | Facility: HOSPITAL | Age: 78
LOS: 1 days | End: 2023-08-10
Payer: MEDICARE

## 2023-08-10 VITALS
TEMPERATURE: 99 F | OXYGEN SATURATION: 99 % | WEIGHT: 132.94 LBS | DIASTOLIC BLOOD PRESSURE: 80 MMHG | HEIGHT: 57 IN | RESPIRATION RATE: 14 BRPM | SYSTOLIC BLOOD PRESSURE: 136 MMHG | HEART RATE: 55 BPM

## 2023-08-10 DIAGNOSIS — M12.811 OTHER SPECIFIC ARTHROPATHIES, NOT ELSEWHERE CLASSIFIED, RIGHT SHOULDER: ICD-10-CM

## 2023-08-10 DIAGNOSIS — Z87.19 PERSONAL HISTORY OF OTHER DISEASES OF THE DIGESTIVE SYSTEM: Chronic | ICD-10-CM

## 2023-08-10 DIAGNOSIS — Z90.12 ACQUIRED ABSENCE OF LEFT BREAST AND NIPPLE: Chronic | ICD-10-CM

## 2023-08-10 DIAGNOSIS — Z98.49 CATARACT EXTRACTION STATUS, UNSPECIFIED EYE: Chronic | ICD-10-CM

## 2023-08-10 DIAGNOSIS — Z98.890 OTHER SPECIFIED POSTPROCEDURAL STATES: Chronic | ICD-10-CM

## 2023-08-10 DIAGNOSIS — Z01.818 ENCOUNTER FOR OTHER PREPROCEDURAL EXAMINATION: ICD-10-CM

## 2023-08-10 DIAGNOSIS — Z90.49 ACQUIRED ABSENCE OF OTHER SPECIFIED PARTS OF DIGESTIVE TRACT: Chronic | ICD-10-CM

## 2023-08-10 DIAGNOSIS — Z90.89 ACQUIRED ABSENCE OF OTHER ORGANS: Chronic | ICD-10-CM

## 2023-08-10 DIAGNOSIS — Z96.653 PRESENCE OF ARTIFICIAL KNEE JOINT, BILATERAL: Chronic | ICD-10-CM

## 2023-08-10 DIAGNOSIS — M19.011 PRIMARY OSTEOARTHRITIS, RIGHT SHOULDER: ICD-10-CM

## 2023-08-10 LAB
A1C WITH ESTIMATED AVERAGE GLUCOSE RESULT: 5.9 % — HIGH (ref 4–5.6)
ALBUMIN SERPL ELPH-MCNC: 4.2 G/DL — SIGNIFICANT CHANGE UP (ref 3.3–5)
ALP SERPL-CCNC: 78 U/L — SIGNIFICANT CHANGE UP (ref 30–120)
ALT FLD-CCNC: 34 U/L DA — SIGNIFICANT CHANGE UP (ref 10–60)
ANION GAP SERPL CALC-SCNC: 5 MMOL/L — SIGNIFICANT CHANGE UP (ref 5–17)
APTT BLD: 25.5 SEC — SIGNIFICANT CHANGE UP (ref 24.5–35.6)
AST SERPL-CCNC: 24 U/L — SIGNIFICANT CHANGE UP (ref 10–40)
BILIRUB SERPL-MCNC: 0.6 MG/DL — SIGNIFICANT CHANGE UP (ref 0.2–1.2)
BLD GP AB SCN SERPL QL: SIGNIFICANT CHANGE UP
BUN SERPL-MCNC: 31 MG/DL — HIGH (ref 7–23)
CALCIUM SERPL-MCNC: 10.2 MG/DL — SIGNIFICANT CHANGE UP (ref 8.4–10.5)
CHLORIDE SERPL-SCNC: 105 MMOL/L — SIGNIFICANT CHANGE UP (ref 96–108)
CO2 SERPL-SCNC: 29 MMOL/L — SIGNIFICANT CHANGE UP (ref 22–31)
CREAT SERPL-MCNC: 0.9 MG/DL — SIGNIFICANT CHANGE UP (ref 0.5–1.3)
EGFR: 65 ML/MIN/1.73M2 — SIGNIFICANT CHANGE UP
ESTIMATED AVERAGE GLUCOSE: 123 MG/DL — HIGH (ref 68–114)
GLUCOSE SERPL-MCNC: 100 MG/DL — HIGH (ref 70–99)
HCT VFR BLD CALC: 42.3 % — SIGNIFICANT CHANGE UP (ref 34.5–45)
HGB BLD-MCNC: 13.7 G/DL — SIGNIFICANT CHANGE UP (ref 11.5–15.5)
INR BLD: 0.9 RATIO — SIGNIFICANT CHANGE UP (ref 0.85–1.18)
MCHC RBC-ENTMCNC: 30.4 PG — SIGNIFICANT CHANGE UP (ref 27–34)
MCHC RBC-ENTMCNC: 32.4 GM/DL — SIGNIFICANT CHANGE UP (ref 32–36)
MCV RBC AUTO: 94 FL — SIGNIFICANT CHANGE UP (ref 80–100)
MRSA PCR RESULT.: SIGNIFICANT CHANGE UP
NRBC # BLD: 0 /100 WBCS — SIGNIFICANT CHANGE UP (ref 0–0)
PLATELET # BLD AUTO: 201 K/UL — SIGNIFICANT CHANGE UP (ref 150–400)
POTASSIUM SERPL-MCNC: 4.5 MMOL/L — SIGNIFICANT CHANGE UP (ref 3.5–5.3)
POTASSIUM SERPL-SCNC: 4.5 MMOL/L — SIGNIFICANT CHANGE UP (ref 3.5–5.3)
PROT SERPL-MCNC: 7.2 G/DL — SIGNIFICANT CHANGE UP (ref 6–8.3)
PROTHROM AB SERPL-ACNC: 10.1 SEC — SIGNIFICANT CHANGE UP (ref 9.5–13)
RBC # BLD: 4.5 M/UL — SIGNIFICANT CHANGE UP (ref 3.8–5.2)
RBC # FLD: 13 % — SIGNIFICANT CHANGE UP (ref 10.3–14.5)
S AUREUS DNA NOSE QL NAA+PROBE: SIGNIFICANT CHANGE UP
SODIUM SERPL-SCNC: 139 MMOL/L — SIGNIFICANT CHANGE UP (ref 135–145)
WBC # BLD: 7.68 K/UL — SIGNIFICANT CHANGE UP (ref 3.8–10.5)
WBC # FLD AUTO: 7.68 K/UL — SIGNIFICANT CHANGE UP (ref 3.8–10.5)

## 2023-08-10 PROCEDURE — 93005 ELECTROCARDIOGRAM TRACING: CPT

## 2023-08-10 PROCEDURE — 93010 ELECTROCARDIOGRAM REPORT: CPT

## 2023-08-10 PROCEDURE — G0463: CPT

## 2023-08-10 RX ORDER — BUTALBITAL/ACETAMINOPHEN 50MG-650MG
1 TABLET ORAL
Qty: 0 | Refills: 0 | DISCHARGE

## 2023-08-10 RX ORDER — ASPIRIN/CALCIUM CARB/MAGNESIUM 324 MG
1 TABLET ORAL
Qty: 0 | Refills: 0 | DISCHARGE

## 2023-08-10 NOTE — H&P PST ADULT - MUSCULOSKELETAL
details… no joint swelling/no joint erythema/no joint warmth/no calf tenderness/decreased ROM due to pain/back exam/extremities exam

## 2023-08-10 NOTE — H&P PST ADULT - PROBLEM SELECTOR PLAN 1
Right reverse total shoulder replacement is planned for 8/17/2023  Diagnostic testing performed  Medical and cardiac clearances requested  The pre op instructions were reviewed with the patient and her son and written instructions were provided  best wishes offered

## 2023-08-10 NOTE — H&P PST ADULT - NEGATIVE ENMT SYMPTOMS
no hearing difficulty/no ear pain/no vertigo/no sinus symptoms/no nasal congestion/no gum bleeding/no dry mouth/no throat pain

## 2023-08-10 NOTE — H&P PST ADULT - HISTORY OF PRESENT ILLNESS
79 yo female reports fall injury 1 week ago with injury to right shoulder.  Her pain is constant and rates 10/10 and she is unable to use her right arm.  She is scheduled right reverse total shoulder replacement on 8/17/2023 @ [facility].

## 2023-08-10 NOTE — H&P PST ADULT - NSICDXPASTSURGICALHX_GEN_ALL_CORE_FT
Spoke to Agnes perez at [facility] and updated on new order. Sign plz.    PAST SURGICAL HISTORY:  H/O hiatal hernia surgery approx 2002    History of cataract surgery     S/P appendectomy     S/P carpal tunnel release right    S/P lumpectomy, left breast approx 2008    S/P ovarian cystectomy     S/P tonsillectomy     S/p total knee replacement, bilateral 2017

## 2023-08-10 NOTE — H&P PST ADULT - NEGATIVE ALLERGY TYPES
[FreeTextEntry1] : 45 year old male, muscle mass, AA\par \par # CKD- with abnormal cr 2-3 years ago, was using creatinine supplements>5years, , exercises frequently and recent covid19 infection end of March\par -obtain cr from 2-3 years ago first PMD Dr WOLFF ; [facility] to establish chronicity\par -recent cr uptrending 1.7 to 1.91 in setting of covid19; repeat renal panel this week ; ?tobi in setting of infection\par  -taking tumeric pills for years- advised to stop\par -advised to stay away from other herbal , otc , creatinine supplements, or NSAID\par creatinine stopped march 2020\par - eats high protein diet\par - UA neg prot/blood in June points less likely to underlying GN, and possible NANCI; ?tobi in setting of covid19 now\par -renal sono smallish kidneys- he is approx 6 ft tall\par -check GN serology\par -check urine, prot/cr\par -may need renal biopsy to establish etiology\par \par #h/o elevated BP recently, no h/o htn- check BP trends at home; advised to buy BP monitor and check BP over the next 1-3 mos at various times and record it\par \par #renal right kidney complex cyst; sono in june-needs urology and follow up q 6mos per urology\par \par  Spent 1hr with patient and more \par \par  plan for labs and urine \par will call when he does labs to go over no reactions to food

## 2023-08-15 PROBLEM — M19.011 PRIMARY OSTEOARTHRITIS, RIGHT SHOULDER: Chronic | Status: ACTIVE | Noted: 2023-08-10

## 2023-08-15 PROBLEM — G43.909 MIGRAINE, UNSPECIFIED, NOT INTRACTABLE, WITHOUT STATUS MIGRAINOSUS: Chronic | Status: ACTIVE | Noted: 2023-08-10

## 2023-08-15 PROBLEM — E78.00 PURE HYPERCHOLESTEROLEMIA, UNSPECIFIED: Chronic | Status: ACTIVE | Noted: 2023-08-10

## 2023-08-15 PROBLEM — K21.9 GASTRO-ESOPHAGEAL REFLUX DISEASE WITHOUT ESOPHAGITIS: Chronic | Status: ACTIVE | Noted: 2023-08-10

## 2023-08-15 PROBLEM — J30.2 OTHER SEASONAL ALLERGIC RHINITIS: Chronic | Status: ACTIVE | Noted: 2023-08-10

## 2023-08-15 PROBLEM — Z85.3 PERSONAL HISTORY OF MALIGNANT NEOPLASM OF BREAST: Chronic | Status: ACTIVE | Noted: 2023-08-10

## 2023-08-15 PROBLEM — I10 ESSENTIAL (PRIMARY) HYPERTENSION: Chronic | Status: ACTIVE | Noted: 2023-08-10

## 2023-08-16 ENCOUNTER — TRANSCRIPTION ENCOUNTER (OUTPATIENT)
Age: 78
End: 2023-08-16

## 2023-08-17 ENCOUNTER — APPOINTMENT (OUTPATIENT)
Dept: ORTHOPEDIC SURGERY | Facility: HOSPITAL | Age: 78
End: 2023-08-17

## 2023-08-17 ENCOUNTER — INPATIENT (INPATIENT)
Facility: HOSPITAL | Age: 78
LOS: 0 days | Discharge: HOME | DRG: 483 | End: 2023-08-18
Attending: ORTHOPAEDIC SURGERY | Admitting: ORTHOPAEDIC SURGERY
Payer: MEDICARE

## 2023-08-17 ENCOUNTER — TRANSCRIPTION ENCOUNTER (OUTPATIENT)
Age: 78
End: 2023-08-17

## 2023-08-17 VITALS
TEMPERATURE: 98 F | HEIGHT: 57 IN | WEIGHT: 134.48 LBS | OXYGEN SATURATION: 100 % | SYSTOLIC BLOOD PRESSURE: 125 MMHG | HEART RATE: 59 BPM | RESPIRATION RATE: 16 BRPM | DIASTOLIC BLOOD PRESSURE: 63 MMHG

## 2023-08-17 DIAGNOSIS — Z87.19 PERSONAL HISTORY OF OTHER DISEASES OF THE DIGESTIVE SYSTEM: Chronic | ICD-10-CM

## 2023-08-17 DIAGNOSIS — Z90.12 ACQUIRED ABSENCE OF LEFT BREAST AND NIPPLE: Chronic | ICD-10-CM

## 2023-08-17 DIAGNOSIS — Z98.49 CATARACT EXTRACTION STATUS, UNSPECIFIED EYE: Chronic | ICD-10-CM

## 2023-08-17 DIAGNOSIS — Z98.890 OTHER SPECIFIED POSTPROCEDURAL STATES: Chronic | ICD-10-CM

## 2023-08-17 DIAGNOSIS — M12.811 OTHER SPECIFIC ARTHROPATHIES, NOT ELSEWHERE CLASSIFIED, RIGHT SHOULDER: ICD-10-CM

## 2023-08-17 DIAGNOSIS — Z90.89 ACQUIRED ABSENCE OF OTHER ORGANS: Chronic | ICD-10-CM

## 2023-08-17 DIAGNOSIS — Z01.818 ENCOUNTER FOR OTHER PREPROCEDURAL EXAMINATION: ICD-10-CM

## 2023-08-17 DIAGNOSIS — Z90.49 ACQUIRED ABSENCE OF OTHER SPECIFIED PARTS OF DIGESTIVE TRACT: Chronic | ICD-10-CM

## 2023-08-17 DIAGNOSIS — Z96.653 PRESENCE OF ARTIFICIAL KNEE JOINT, BILATERAL: Chronic | ICD-10-CM

## 2023-08-17 LAB
ANION GAP SERPL CALC-SCNC: 12 MMOL/L — SIGNIFICANT CHANGE UP (ref 5–17)
BUN SERPL-MCNC: 14 MG/DL — SIGNIFICANT CHANGE UP (ref 7–23)
CALCIUM SERPL-MCNC: 9 MG/DL — SIGNIFICANT CHANGE UP (ref 8.4–10.5)
CHLORIDE SERPL-SCNC: 109 MMOL/L — HIGH (ref 96–108)
CO2 SERPL-SCNC: 21 MMOL/L — LOW (ref 22–31)
CREAT SERPL-MCNC: 0.53 MG/DL — SIGNIFICANT CHANGE UP (ref 0.5–1.3)
EGFR: 95 ML/MIN/1.73M2 — SIGNIFICANT CHANGE UP
GLUCOSE SERPL-MCNC: 192 MG/DL — HIGH (ref 70–99)
HCT VFR BLD CALC: 37.8 % — SIGNIFICANT CHANGE UP (ref 34.5–45)
HGB BLD-MCNC: 12.5 G/DL — SIGNIFICANT CHANGE UP (ref 11.5–15.5)
MCHC RBC-ENTMCNC: 30.3 PG — SIGNIFICANT CHANGE UP (ref 27–34)
MCHC RBC-ENTMCNC: 33.1 GM/DL — SIGNIFICANT CHANGE UP (ref 32–36)
MCV RBC AUTO: 91.7 FL — SIGNIFICANT CHANGE UP (ref 80–100)
NRBC # BLD: 0 /100 WBCS — SIGNIFICANT CHANGE UP (ref 0–0)
PLATELET # BLD AUTO: 185 K/UL — SIGNIFICANT CHANGE UP (ref 150–400)
POTASSIUM SERPL-MCNC: 3.4 MMOL/L — LOW (ref 3.5–5.3)
POTASSIUM SERPL-SCNC: 3.4 MMOL/L — LOW (ref 3.5–5.3)
RBC # BLD: 4.12 M/UL — SIGNIFICANT CHANGE UP (ref 3.8–5.2)
RBC # FLD: 13 % — SIGNIFICANT CHANGE UP (ref 10.3–14.5)
SODIUM SERPL-SCNC: 142 MMOL/L — SIGNIFICANT CHANGE UP (ref 135–145)
WBC # BLD: 9.83 K/UL — SIGNIFICANT CHANGE UP (ref 3.8–10.5)
WBC # FLD AUTO: 9.83 K/UL — SIGNIFICANT CHANGE UP (ref 3.8–10.5)

## 2023-08-17 PROCEDURE — 23472 RECONSTRUCT SHOULDER JOINT: CPT | Mod: RT

## 2023-08-17 PROCEDURE — 73030 X-RAY EXAM OF SHOULDER: CPT | Mod: 26,RT

## 2023-08-17 PROCEDURE — 99222 1ST HOSP IP/OBS MODERATE 55: CPT

## 2023-08-17 DEVICE — BEARING HUMERAL 36MM STD: Type: IMPLANTABLE DEVICE | Site: RIGHT | Status: FUNCTIONAL

## 2023-08-17 DEVICE — IMPLANTABLE DEVICE: Type: IMPLANTABLE DEVICE | Site: RIGHT | Status: FUNCTIONAL

## 2023-08-17 DEVICE — BASEPLATE GLENOID MINI REV 25MM: Type: IMPLANTABLE DEVICE | Site: RIGHT | Status: FUNCTIONAL

## 2023-08-17 DEVICE — SCREW COMP LOKG 3.5 HEX 4.75X20MM: Type: IMPLANTABLE DEVICE | Site: RIGHT | Status: FUNCTIONAL

## 2023-08-17 DEVICE — PIN REVERSE SHOULDER: Type: IMPLANTABLE DEVICE | Site: RIGHT | Status: FUNCTIONAL

## 2023-08-17 DEVICE — SCREW RVS CNTRL 6.5X25MM ST: Type: IMPLANTABLE DEVICE | Site: RIGHT | Status: FUNCTIONAL

## 2023-08-17 DEVICE — SCREW COMP LOCKING 3.5 HEX 4.75X15MM: Type: IMPLANTABLE DEVICE | Site: RIGHT | Status: FUNCTIONAL

## 2023-08-17 DEVICE — TRAY HUM COMP VERSA-DIA STD: Type: IMPLANTABLE DEVICE | Site: RIGHT | Status: FUNCTIONAL

## 2023-08-17 DEVICE — SCREW LOKG FIXED 3.5 HEX 4.75X30MM: Type: IMPLANTABLE DEVICE | Site: RIGHT | Status: FUNCTIONAL

## 2023-08-17 DEVICE — GLENOSPHERE 36MM DIA OF CURVE: Type: IMPLANTABLE DEVICE | Site: RIGHT | Status: FUNCTIONAL

## 2023-08-17 RX ORDER — TOPIRAMATE 25 MG
50 TABLET ORAL
Refills: 0 | Status: DISCONTINUED | OUTPATIENT
Start: 2023-08-17 | End: 2023-08-17

## 2023-08-17 RX ORDER — TOPIRAMATE 25 MG
75 TABLET ORAL
Refills: 0 | Status: DISCONTINUED | OUTPATIENT
Start: 2023-08-17 | End: 2023-08-18

## 2023-08-17 RX ORDER — MONTELUKAST 4 MG/1
10 TABLET, CHEWABLE ORAL DAILY
Refills: 0 | Status: DISCONTINUED | OUTPATIENT
Start: 2023-08-17 | End: 2023-08-18

## 2023-08-17 RX ORDER — TRANEXAMIC ACID 100 MG/ML
1000 INJECTION, SOLUTION INTRAVENOUS ONCE
Refills: 0 | Status: COMPLETED | OUTPATIENT
Start: 2023-08-17 | End: 2023-08-17

## 2023-08-17 RX ORDER — APREPITANT 80 MG/1
40 CAPSULE ORAL ONCE
Refills: 0 | Status: COMPLETED | OUTPATIENT
Start: 2023-08-17 | End: 2023-08-17

## 2023-08-17 RX ORDER — HYDROMORPHONE HYDROCHLORIDE 2 MG/ML
0.5 INJECTION INTRAMUSCULAR; INTRAVENOUS; SUBCUTANEOUS
Refills: 0 | Status: DISCONTINUED | OUTPATIENT
Start: 2023-08-17 | End: 2023-08-18

## 2023-08-17 RX ORDER — ATORVASTATIN CALCIUM 80 MG/1
1 TABLET, FILM COATED ORAL
Qty: 0 | Refills: 0 | DISCHARGE

## 2023-08-17 RX ORDER — ACETAMINOPHEN 500 MG
1000 TABLET ORAL ONCE
Refills: 0 | Status: COMPLETED | OUTPATIENT
Start: 2023-08-17 | End: 2023-08-17

## 2023-08-17 RX ORDER — ASPIRIN/CALCIUM CARB/MAGNESIUM 324 MG
81 TABLET ORAL DAILY
Refills: 0 | Status: DISCONTINUED | OUTPATIENT
Start: 2023-08-18 | End: 2023-08-18

## 2023-08-17 RX ORDER — POLYETHYLENE GLYCOL 3350 17 G/17G
17 POWDER, FOR SOLUTION ORAL AT BEDTIME
Refills: 0 | Status: DISCONTINUED | OUTPATIENT
Start: 2023-08-17 | End: 2023-08-18

## 2023-08-17 RX ORDER — OXYCODONE HYDROCHLORIDE 5 MG/1
10 TABLET ORAL
Refills: 0 | Status: DISCONTINUED | OUTPATIENT
Start: 2023-08-17 | End: 2023-08-18

## 2023-08-17 RX ORDER — CELECOXIB 200 MG/1
200 CAPSULE ORAL EVERY 12 HOURS
Refills: 0 | Status: DISCONTINUED | OUTPATIENT
Start: 2023-08-17 | End: 2023-08-18

## 2023-08-17 RX ORDER — FLUTICASONE PROPIONATE AND SALMETEROL 50; 250 UG/1; UG/1
1 POWDER ORAL; RESPIRATORY (INHALATION)
Qty: 0 | Refills: 0 | DISCHARGE

## 2023-08-17 RX ORDER — ATENOLOL 25 MG/1
1 TABLET ORAL
Qty: 0 | Refills: 0 | DISCHARGE

## 2023-08-17 RX ORDER — SODIUM CHLORIDE 9 MG/ML
1000 INJECTION, SOLUTION INTRAVENOUS
Refills: 0 | Status: DISCONTINUED | OUTPATIENT
Start: 2023-08-17 | End: 2023-08-18

## 2023-08-17 RX ORDER — FENTANYL CITRATE 50 UG/ML
25 INJECTION INTRAVENOUS
Refills: 0 | Status: DISCONTINUED | OUTPATIENT
Start: 2023-08-17 | End: 2023-08-18

## 2023-08-17 RX ORDER — FLUTICASONE PROPIONATE AND SALMETEROL 50; 250 UG/1; UG/1
1 POWDER ORAL; RESPIRATORY (INHALATION)
Refills: 0 | DISCHARGE

## 2023-08-17 RX ORDER — ONDANSETRON 8 MG/1
4 TABLET, FILM COATED ORAL ONCE
Refills: 0 | Status: DISCONTINUED | OUTPATIENT
Start: 2023-08-17 | End: 2023-08-17

## 2023-08-17 RX ORDER — SODIUM CHLORIDE 9 MG/ML
1000 INJECTION, SOLUTION INTRAVENOUS
Refills: 0 | Status: DISCONTINUED | OUTPATIENT
Start: 2023-08-17 | End: 2023-08-17

## 2023-08-17 RX ORDER — FENTANYL CITRATE 50 UG/ML
50 INJECTION INTRAVENOUS
Refills: 0 | Status: DISCONTINUED | OUTPATIENT
Start: 2023-08-17 | End: 2023-08-18

## 2023-08-17 RX ORDER — OXYCODONE HYDROCHLORIDE 5 MG/1
5 TABLET ORAL
Refills: 0 | Status: DISCONTINUED | OUTPATIENT
Start: 2023-08-17 | End: 2023-08-18

## 2023-08-17 RX ORDER — MONTELUKAST 4 MG/1
1 TABLET, CHEWABLE ORAL
Refills: 0 | DISCHARGE

## 2023-08-17 RX ORDER — ATENOLOL 25 MG/1
50 TABLET ORAL
Refills: 0 | Status: DISCONTINUED | OUTPATIENT
Start: 2023-08-17 | End: 2023-08-18

## 2023-08-17 RX ORDER — ATORVASTATIN CALCIUM 80 MG/1
40 TABLET, FILM COATED ORAL AT BEDTIME
Refills: 0 | Status: DISCONTINUED | OUTPATIENT
Start: 2023-08-17 | End: 2023-08-18

## 2023-08-17 RX ORDER — CEFAZOLIN SODIUM 1 G
2000 VIAL (EA) INJECTION ONCE
Refills: 0 | Status: COMPLETED | OUTPATIENT
Start: 2023-08-17 | End: 2023-08-17

## 2023-08-17 RX ORDER — PANTOPRAZOLE SODIUM 20 MG/1
40 TABLET, DELAYED RELEASE ORAL
Refills: 0 | Status: DISCONTINUED | OUTPATIENT
Start: 2023-08-17 | End: 2023-08-18

## 2023-08-17 RX ORDER — TOPIRAMATE 25 MG
1 TABLET ORAL
Refills: 0 | DISCHARGE

## 2023-08-17 RX ORDER — CHLORHEXIDINE GLUCONATE 213 G/1000ML
1 SOLUTION TOPICAL ONCE
Refills: 0 | Status: COMPLETED | OUTPATIENT
Start: 2023-08-17 | End: 2023-08-17

## 2023-08-17 RX ORDER — TOPIRAMATE 25 MG
1 TABLET ORAL
Qty: 0 | Refills: 0 | DISCHARGE

## 2023-08-17 RX ORDER — SENNA PLUS 8.6 MG/1
2 TABLET ORAL AT BEDTIME
Refills: 0 | Status: DISCONTINUED | OUTPATIENT
Start: 2023-08-17 | End: 2023-08-18

## 2023-08-17 RX ORDER — MAGNESIUM HYDROXIDE 400 MG/1
30 TABLET, CHEWABLE ORAL DAILY
Refills: 0 | Status: DISCONTINUED | OUTPATIENT
Start: 2023-08-17 | End: 2023-08-18

## 2023-08-17 RX ORDER — TOPIRAMATE 25 MG
25 TABLET ORAL DAILY
Refills: 0 | Status: DISCONTINUED | OUTPATIENT
Start: 2023-08-17 | End: 2023-08-17

## 2023-08-17 RX ORDER — ONDANSETRON 8 MG/1
4 TABLET, FILM COATED ORAL EVERY 6 HOURS
Refills: 0 | Status: DISCONTINUED | OUTPATIENT
Start: 2023-08-17 | End: 2023-08-18

## 2023-08-17 RX ORDER — ALBUTEROL 90 UG/1
2 AEROSOL, METERED ORAL EVERY 6 HOURS
Refills: 0 | Status: DISCONTINUED | OUTPATIENT
Start: 2023-08-17 | End: 2023-08-18

## 2023-08-17 RX ORDER — MONTELUKAST 4 MG/1
1 TABLET, CHEWABLE ORAL
Qty: 0 | Refills: 0 | DISCHARGE

## 2023-08-17 RX ORDER — SODIUM CHLORIDE 9 MG/ML
500 INJECTION INTRAMUSCULAR; INTRAVENOUS; SUBCUTANEOUS ONCE
Refills: 0 | Status: COMPLETED | OUTPATIENT
Start: 2023-08-17 | End: 2023-08-17

## 2023-08-17 RX ORDER — ACETAMINOPHEN 500 MG
1000 TABLET ORAL EVERY 8 HOURS
Refills: 0 | Status: DISCONTINUED | OUTPATIENT
Start: 2023-08-17 | End: 2023-08-18

## 2023-08-17 RX ORDER — PANTOPRAZOLE SODIUM 20 MG/1
1 TABLET, DELAYED RELEASE ORAL
Qty: 0 | Refills: 0 | DISCHARGE

## 2023-08-17 RX ORDER — CEFAZOLIN SODIUM 1 G
2000 VIAL (EA) INJECTION EVERY 8 HOURS
Refills: 0 | Status: COMPLETED | OUTPATIENT
Start: 2023-08-17 | End: 2023-08-17

## 2023-08-17 RX ORDER — DEXAMETHASONE 0.5 MG/5ML
8 ELIXIR ORAL ONCE
Refills: 0 | Status: COMPLETED | OUTPATIENT
Start: 2023-08-18 | End: 2023-08-18

## 2023-08-17 RX ORDER — BUDESONIDE AND FORMOTEROL FUMARATE DIHYDRATE 160; 4.5 UG/1; UG/1
2 AEROSOL RESPIRATORY (INHALATION)
Refills: 0 | Status: DISCONTINUED | OUTPATIENT
Start: 2023-08-17 | End: 2023-08-18

## 2023-08-17 RX ORDER — POTASSIUM CHLORIDE 20 MEQ
40 PACKET (EA) ORAL ONCE
Refills: 0 | Status: COMPLETED | OUTPATIENT
Start: 2023-08-17 | End: 2023-08-17

## 2023-08-17 RX ADMIN — Medication 1000 MILLIGRAM(S): at 22:10

## 2023-08-17 RX ADMIN — Medication 40 MILLIEQUIVALENT(S): at 19:04

## 2023-08-17 RX ADMIN — SODIUM CHLORIDE 125 MILLILITER(S): 9 INJECTION, SOLUTION INTRAVENOUS at 22:10

## 2023-08-17 RX ADMIN — CELECOXIB 200 MILLIGRAM(S): 200 CAPSULE ORAL at 22:09

## 2023-08-17 RX ADMIN — BUDESONIDE AND FORMOTEROL FUMARATE DIHYDRATE 2 PUFF(S): 160; 4.5 AEROSOL RESPIRATORY (INHALATION) at 19:04

## 2023-08-17 RX ADMIN — MONTELUKAST 10 MILLIGRAM(S): 4 TABLET, CHEWABLE ORAL at 22:10

## 2023-08-17 RX ADMIN — Medication 400 MILLIGRAM(S): at 15:25

## 2023-08-17 RX ADMIN — Medication 1000 MILLIGRAM(S): at 22:16

## 2023-08-17 RX ADMIN — OXYCODONE HYDROCHLORIDE 5 MILLIGRAM(S): 5 TABLET ORAL at 23:31

## 2023-08-17 RX ADMIN — SODIUM CHLORIDE 500 MILLILITER(S): 9 INJECTION INTRAMUSCULAR; INTRAVENOUS; SUBCUTANEOUS at 14:06

## 2023-08-17 RX ADMIN — CHLORHEXIDINE GLUCONATE 1 APPLICATION(S): 213 SOLUTION TOPICAL at 07:13

## 2023-08-17 RX ADMIN — APREPITANT 40 MILLIGRAM(S): 80 CAPSULE ORAL at 07:13

## 2023-08-17 RX ADMIN — ATORVASTATIN CALCIUM 40 MILLIGRAM(S): 80 TABLET, FILM COATED ORAL at 22:09

## 2023-08-17 RX ADMIN — ATENOLOL 50 MILLIGRAM(S): 25 TABLET ORAL at 17:23

## 2023-08-17 RX ADMIN — CELECOXIB 200 MILLIGRAM(S): 200 CAPSULE ORAL at 22:16

## 2023-08-17 RX ADMIN — SODIUM CHLORIDE 500 MILLILITER(S): 9 INJECTION INTRAMUSCULAR; INTRAVENOUS; SUBCUTANEOUS at 10:29

## 2023-08-17 RX ADMIN — SODIUM CHLORIDE 125 MILLILITER(S): 9 INJECTION, SOLUTION INTRAVENOUS at 14:07

## 2023-08-17 RX ADMIN — Medication 100 MILLIGRAM(S): at 23:32

## 2023-08-17 RX ADMIN — SODIUM CHLORIDE 75 MILLILITER(S): 9 INJECTION, SOLUTION INTRAVENOUS at 10:30

## 2023-08-17 RX ADMIN — Medication 1000 MILLIGRAM(S): at 15:27

## 2023-08-17 RX ADMIN — Medication 75 MILLIGRAM(S): at 17:23

## 2023-08-17 RX ADMIN — Medication 100 MILLIGRAM(S): at 16:24

## 2023-08-17 RX ADMIN — SODIUM CHLORIDE 125 MILLILITER(S): 9 INJECTION, SOLUTION INTRAVENOUS at 15:25

## 2023-08-17 NOTE — PATIENT CHOICE NOTE. - NSPTCHOICESTATE_GEN_ALL_CORE

## 2023-08-17 NOTE — PATIENT PROFILE ADULT - HOW PATIENT ADDRESSED, PROFILE
maira Left LE Active ROM was WFL (within functional limits)/bilateral upper extremity Active ROM was WFL (within functional limits)/right hip ROM limited/not assessed

## 2023-08-17 NOTE — DISCHARGE NOTE PROVIDER - NSDCFUADDINST_GEN_ALL_CORE_FT
For Constipation :   • Increase your water intake. Drink at least 8 glasses of water daily.  • Try adding fiber to your diet by eating fruits, vegetables and foods that are rich in grains.  • If you do experience constipation, you may take an over-the-counter stool softener/laxative such as Julianne Colace, Senekot or Milk of Magnesia.  - Call your doctor if you experience:  • An increase in pain not controlled by pain medication or change in activity or  position.  • Temperature greater than 101° F.  • Redness, increased swelling or foul smelling drainage from or around the  incision.  • Numbness, tingling or a change in color or temperature of the operative leg.  • Call your doctor immediately if you experience chest pain, shortness of breath or calf pain.

## 2023-08-17 NOTE — DISCHARGE NOTE PROVIDER - NSDCHOSPICE_GEN_A_CORE
Patient accepted by [facility].  Spoke with Sasha she reported that they received the referral, and if patient discharges today he will admit on tomorrow.        No

## 2023-08-17 NOTE — PHYSICAL THERAPY INITIAL EVALUATION ADULT - PERTINENT HX OF CURRENT PROBLEM, REHAB EVAL
77 yo female reports fall injury 1 week ago with injury to right shoulder.  Her pain is constant and rates 10/10 and she is unable to use her right arm.  She is scheduled right reverse total shoulder replacement on 8/17/2023 @ [facility].

## 2023-08-17 NOTE — PATIENT PROFILE ADULT - FALL HARM RISK - RISK INTERVENTIONS

## 2023-08-17 NOTE — CARE COORDINATION ASSESSMENT. - OTHER PERTINENT DISCHARGE PLANNING INFORMATION:
Patient is a 78 year old female admitted from home today s/p right shoulder surgery. SW received consult for life challenges pt would like physical therapy at home, lives with son and daughter in law, per pt may be difficult for them to help her get to and from PT appointments. I marked consult as complete after meeting with patient, her son bob [phone] and daughter in law rosenda [phone]. They live together in a private home. Patient also has spouse at home. Patient has 5 steps to enter home. she had no complaints of pain and seems to be coping well with admission. Plan will be for home pending OTE and PTE and medically optimized. I updated  who will follow up after patient is seen by therapy. Her family will be available to assist upon transition home   Patient is a 78 year old female admitted from home today s/p right shoulder surgery. SW received consult for life challenges pt would like physical therapy at home, lives with son and daughter in law, per pt may be difficult for them to help her get to and from PT appointments. I marked consult as complete after meeting with patient, her son bob [phone] and daughter in law rosenda [phone]. They live together in a private home. Patient also has spouse at home. Patient has 5 steps to enter home. she had no complaints of pain and seems to be coping well with admission. Plan will be for home pending OTE and PTE and medically optimized. I updated  who will follow up after patient is seen by therapy. Her family will be available to assist upon transition home. Her PCP Dr Ernandez [phone] pharmacy [facility] [phone]

## 2023-08-17 NOTE — DISCHARGE NOTE PROVIDER - CARE PROVIDERS DIRECT ADDRESSES
[email or web address]

## 2023-08-17 NOTE — PHYSICAL THERAPY INITIAL EVALUATION ADULT - ACTIVE RANGE OF MOTION EXAMINATION, REHAB EVAL
right UE due to block/Left UE Active ROM was WNL (within normal limits)/bilateral lower extremity Active ROM was WNL (within normal limits)/deficits as listed below

## 2023-08-17 NOTE — PATIENT PROFILE ADULT - LIFE CHALLENGES - DETAILS
pt would like physical therapy at home, lives with son and daughter in law, per pt may be difficult for them to help her get to and from PT appointments

## 2023-08-17 NOTE — DISCHARGE NOTE PROVIDER - NSDCMRMEDTOKEN_GEN_ALL_CORE_FT
Advair Diskus 250 mcg-50 mcg inhalation powder: 1 puff(s) inhaled once a day  albuterol 90 mcg/inh inhalation aerosol: 2 puff(s) inhaled every 6 hours, As needed, Shortness of Breath and/or Wheezing  aspirin 81 mg oral capsule: 1 orally once a day  atenolol 50 mg oral tablet: 1 tab(s) orally 2 times a day  fluticasone-salmeterol 250 mcg-50 mcg inhalation powder: 1 inhaled 2 times a day  Lipitor 40 mg oral tablet: 1 tab(s) orally once a day (at bedtime)  montelukast 10 mg oral tablet: 1 orally once a day  Multiple Vitamins oral capsule: 1 orally once a day  pantoprazole 40 mg oral delayed release tablet: 1 tab(s) orally once a day  Topiragen 25 mg oral tablet: 1 orally once a day  topiramate 50 mg oral tablet: 1 tab(s) orally 2 times a day   acetaminophen 500 mg oral tablet: 2 tab(s) orally every 8 hours  Advair Diskus 250 mcg-50 mcg inhalation powder: 1 puff(s) inhaled once a day  albuterol 90 mcg/inh inhalation aerosol: 2 puff(s) inhaled every 6 hours, As needed, Shortness of Breath and/or Wheezing  aspirin 81 mg oral delayed release tablet: 1 tab(s) orally once a day  atenolol 50 mg oral tablet: 1 tab(s) orally 2 times a day  celecoxib 200 mg oral capsule: 1 cap(s) orally every 12 hours Take 2 hours after aspirin  fluticasone-salmeterol 250 mcg-50 mcg inhalation powder: 1 inhaled 2 times a day  Lipitor 40 mg oral tablet: 1 tab(s) orally once a day (at bedtime)  montelukast 10 mg oral tablet: 1 orally once a day  pantoprazole 40 mg oral delayed release tablet: 1 tab(s) orally once a day  senna leaf extract oral tablet: 2 tab(s) orally once a day (at bedtime)  Topiragen 25 mg oral tablet: 1 orally once a day  topiramate 50 mg oral tablet: 1 tab(s) orally 2 times a day   acetaminophen 500 mg oral tablet: 2 tab(s) orally every 8 hours  Advair Diskus 250 mcg-50 mcg inhalation powder: 1 puff(s) inhaled once a day  albuterol 90 mcg/inh inhalation aerosol: 2 puff(s) inhaled every 6 hours, As needed, Shortness of Breath and/or Wheezing  aspirin 81 mg oral delayed release tablet: 1 tab(s) orally once a day  atenolol 50 mg oral tablet: 1 tab(s) orally 2 times a day  celecoxib 200 mg oral capsule: 1 cap(s) orally every 12 hours Take 2 hours after aspirin  Dilaudid 2 mg oral tablet: 1 tab(s) orally every 4 hours as needed for  moderate pain MDD: 6  fluticasone-salmeterol 250 mcg-50 mcg inhalation powder: 1 inhaled 2 times a day  Lipitor 40 mg oral tablet: 1 tab(s) orally once a day (at bedtime)  montelukast 10 mg oral tablet: 1 orally once a day  pantoprazole 40 mg oral delayed release tablet: 1 tab(s) orally once a day  senna leaf extract oral tablet: 2 tab(s) orally once a day (at bedtime)  Topiragen 25 mg oral tablet: 1 orally once a day  topiramate 50 mg oral tablet: 1 tab(s) orally 2 times a day

## 2023-08-17 NOTE — DISCHARGE NOTE PROVIDER - HOSPITAL COURSE
This patient was admitted to [facility] with a history of severe degenerative joint disease of the left .  Patient went to Pre-Surgical Testing at [facility] and was medically cleared to undergo a right reverse total shoulder replacement by Dr. Kelly on 8/17/23.  No operative or patrick-operative complications arose during patients hospital course.  Patient received antibiotic according to SCIP guidelines for infection prevention.  SCD's were worn for DVT prophylaxis.  Anesthesia, Medical Hospitalist, Physical Therapy and Occupational Therapy were consulted. Patient is stable for discharge with a good prognosis.  Appropriate discharge instructions and medications are provided in this document.

## 2023-08-17 NOTE — PHYSICAL THERAPY INITIAL EVALUATION ADULT - ADDITIONAL COMMENTS
Pt lives in house with 5  steps to enter with right handrail, 0 steps inside. Pt's son/daughter will assist at home upon d/c.

## 2023-08-17 NOTE — CONSULT NOTE ADULT - SUBJECTIVE AND OBJECTIVE BOX
Patient is a 78y old  Female who presents with a chief complaint of Right reverse total shoulder replacement (17 Aug 2023 09:32)        HPI: OA shoulder, chronic s/p TSR.  Pain controlled.      PAST MEDICAL & SURGICAL HISTORY:  OA (osteoarthritis)      History of breast cancer      Migraines      GERD (gastroesophageal reflux disease)      Hypercholesteremia      Hypertension      Seasonal allergies      Osteoarthritis of right shoulder      H/O hiatal hernia  surgery approx 2002      S/P lumpectomy, left breast  approx 2008      S/P carpal tunnel release  right/left      S/p total knee replacement, bilateral  2017      S/P appendectomy      S/P tonsillectomy      S/P ovarian cystectomy      History of cataract surgery          REVIEW OF SYSTEMS: bruising left shoulder s/p recent fall    other systems currently negative unless otherwise specified in HPI.      MEDICATIONS  (STANDING):  lactated ringers. 1000 milliLiter(s) (75 mL/Hr) IV Continuous <Continuous>    MEDICATIONS  (PRN):  ondansetron Injectable 4 milliGRAM(s) IV Push once PRN Nausea and/or Vomiting      Allergies    codeine (Hives; Rash)    Intolerances    Demerol HCl (Nausea; Vomiting)      SOCIAL HISTORY: No toxic habits reported currently    FAMILY HISTORY:  Family history of myocardial infarction at age less than 60 (Father)    Family history of acute myocardial infarction (Sibling)        Vital Signs Last 24 Hrs  T(C): 36.1 (17 Aug 2023 10:03), Max: 36.4 (17 Aug 2023 06:31)  T(F): 97 (17 Aug 2023 10:03), Max: 97.6 (17 Aug 2023 06:31)  HR: 62 (17 Aug 2023 11:30) (59 - 81)  BP: 153/68 (17 Aug 2023 11:30) (125/63 - 160/78)  BP(mean): --  RR: 20 (17 Aug 2023 11:30) (16 - 20)  SpO2: 95% (17 Aug 2023 11:30) (95% - 100%)    Parameters below as of 17 Aug 2023 11:30  Patient On (Oxygen Delivery Method): room air        PHYSICAL EXAM:  GENERAL: No apparent distress, appears stated age  EYES: Conjunctiva and sclera clear, no discharge  ENMT: Moist mucous membranes, no nasal or ear discharge  NECK: Supple, no JVD  CHEST/LUNG: Clear to auscultation; no rales, wheeze or rubs  HEART: Regular rhythm, no rubs or gallops  ABDOMEN: Soft, Nontender, Nondistended  EXTREMITIES: RUE jyotsna, TSR         LABS:                                                  IMAGING: imaging reviewed personally    Consultant Notes Reviewed and Care Discussed with relevant Consultants/Other Providers.

## 2023-08-17 NOTE — CARE COORDINATION ASSESSMENT. - REASON FOR CONSULT
needs home care family may not be available to transport her to outpatient PT/discharge planning/psychosocial issues

## 2023-08-17 NOTE — PHYSICAL THERAPY INITIAL EVALUATION ADULT - PRECAUTIONS/LIMITATIONS, REHAB EVAL
Begin PROM in supine after intrascalene is resolved. Forward flexion and elevation to 90 deg. ER scapular plane./fall precautions/surgical precautions

## 2023-08-17 NOTE — DISCHARGE NOTE PROVIDER - NSDCFUSCHEDAPPT_GEN_ALL_CORE_FT
Derrek Kelly  [facility]  ORTHOSURG [address] [facility]  Scheduled Appointment: 08/29/2023

## 2023-08-17 NOTE — CARE COORDINATION ASSESSMENT. - NSCAREPROVIDERS_GEN_ALL_CORE_FT
CARE PROVIDERS:  Admitting: Derrek Kelly  Attending: Derrek Kelly  Consultant: Dilshad Covarrubias  Consultant: Pb Mays  Nurse: Nadya Yusuf  Nurse: Tia Paul  Nurse: Hermelinda Martinez  Nurse: Lizbeth Haskins  Nurse: Fatimah Parks  Override: Tia Paul  Physical Therapy: Abi Christine  Physical Therapy: Landy Goodwin  Physical Therapy: Cesar Walker  Primary Team: Graeme Rivera  Primary Team: Johanne Weems  Primary Team: Jolanta Parkinson  Primary Team: Emy Juares  Primary Team: Rosmery Davidson  : Melva Ireland  Team: EBENEZER BERNARD Hospitalists, Team  UR// Supp. Assoc.: Jessica Vega

## 2023-08-17 NOTE — DISCHARGE NOTE PROVIDER - NSDCCPCAREPLAN_GEN_ALL_CORE_FT
PRINCIPAL DISCHARGE DIAGNOSIS  Diagnosis: DJD of right shoulder  Assessment and Plan of Treatment: DO NOT bear weight on operative extremity.  Sling at all times  Remove sling to exercise elbow and shoulder and during bathing  No External Rotation past 30 degrees;   No resisted internal rotation   Passive ROM OK, May do Codman's and pendulums.  You may shower. Dressing is water resistant, not waterproof. Do not aim shower stream at surgical site.  Pat dry after showering.  Mepilex dressing will be removed 2 weeks post-op at surgeon's office.   Pain medication as needed, take with food and take a stool softener to decrease constipation while taking pain medicine  Call MD for severe pain/fever/chills

## 2023-08-17 NOTE — CONSULT NOTE ADULT - ASSESSMENT
78 year-old female with OA shoulder.    1. OA right shoulder s/p TSR:  opiates prn + bowel regimen.   Physical Therapy evaluation.  DVT prophylaxis as per Orthopedic protocol.     2. HTN:  Blood pressure meds with hold parameters     3. Asthma:  Nebs prn    4. GERD: PPI    5. Dispo: pending PT eval.

## 2023-08-17 NOTE — PROGRESS NOTE ADULT - SUBJECTIVE AND OBJECTIVE BOX
78yFemale    Diagnosis:  S/p Right Reverse Total Shoulder Replacement POD#0    Patient was seen and evaluated at bedside. Patient with no acute complaints.   Pain is  well controlled.  Pain score =    1/10     . s/p interscalene nerve block  Tolerating Pain medication.  Denies CP/SOB, dyspnea, paresthesias, N/V/D, palpitations.     Vital Signs Last 24 Hrs  T(C): 36.7 (17 Aug 2023 15:51), Max: 36.7 (17 Aug 2023 11:40)  T(F): 98 (17 Aug 2023 15:51), Max: 98 (17 Aug 2023 11:40)  HR: 69 (17 Aug 2023 17:26) (59 - 81)  BP: 135/81 (17 Aug 2023 17:26) (125/63 - 160/78)  BP(mean): --  RR: 17 (17 Aug 2023 11:40) (16 - 20)  SpO2: 97% (17 Aug 2023 11:40) (95% - 100%)    Parameters below as of 17 Aug 2023 11:40  Patient On (Oxygen Delivery Method): room air      I&O's Detail    17 Aug 2023 07:01  -  17 Aug 2023 19:15  --------------------------------------------------------  IN:    Lactated Ringers: 1000 mL    Sodium Chloride 0.9% Bolus: 500 mL  Total IN: 1500 mL    OUT:    Blood Loss (mL): 100 mL    Voided (mL): 300 mL  Total OUT: 400 mL    Total NET: 1100 mL    Physical Exam:    General: AAOx3, NAD, resting comfortably in bed.    Right/Left Shoulder:  Dressing is C/D/I. Skin is pink and warm.  No erythema.   Upper extremity:  Compartments soft and NT in forearm B/L. 2+pulses. NVI.rip strength decreased.  Good capillary refill. Sensation decreased  SCDs in place                          12.5   9.83  )-----------( 185      ( 17 Aug 2023 17:15 )             37.8     08-17    142  |  109<H>  |  14  ----------------------------<  192<H>  3.4<L>   |  21<L>  |  0.53    Ca    9.0      17 Aug 2023 17:15        MEDICATIONS  (STANDING):  acetaminophen     Tablet .. 1000 milliGRAM(s) Oral every 8 hours  atenolol  Tablet 50 milliGRAM(s) Oral two times a day  atorvastatin 40 milliGRAM(s) Oral at bedtime  budesonide 160 MICROgram(s)/formoterol 4.5 MICROgram(s) Inhaler 2 Puff(s) Inhalation two times a day  ceFAZolin   IVPB 2000 milliGRAM(s) IV Intermittent every 8 hours  celecoxib 200 milliGRAM(s) Oral every 12 hours  dexAMETHasone  IVPB 8 milliGRAM(s) IV Intermittent once  lactated ringers. 1000 milliLiter(s) (125 mL/Hr) IV Continuous <Continuous>  montelukast 10 milliGRAM(s) Oral daily  pantoprazole    Tablet 40 milliGRAM(s) Oral before breakfast  polyethylene glycol 3350 17 Gram(s) Oral at bedtime  senna 2 Tablet(s) Oral at bedtime  topiramate 75 milliGRAM(s) Oral two times a day    MEDICATIONS  (PRN):  albuterol    90 MICROgram(s) HFA Inhaler 2 Puff(s) Inhalation every 6 hours PRN Shortness of Breath and/or Wheezing  aluminum hydroxide/magnesium hydroxide/simethicone Suspension 30 milliLiter(s) Oral four times a day PRN Indigestion  fentaNYL    Injectable 25 MICROGram(s) IV Push every 5 minutes PRN Moderate Pain (4 - 6)  fentaNYL    Injectable 50 MICROGram(s) IV Push every 15 minutes PRN Severe Pain (7 - 10)  HYDROmorphone  Injectable 0.5 milliGRAM(s) IV Push every 3 hours PRN Breakthrough pain  magnesium hydroxide Suspension 30 milliLiter(s) Oral daily PRN Constipation  ondansetron Injectable 4 milliGRAM(s) IV Push every 6 hours PRN Nausea and/or Vomiting  oxyCODONE    IR 5 milliGRAM(s) Oral every 3 hours PRN Moderate Pain (4 - 6)  oxyCODONE    IR 10 milliGRAM(s) Oral every 3 hours PRN Severe Pain (7 - 10)      Impression:  78yFemale S/p Right/Left Total Shoulder Replacement POD#  Plan:  -  Pain management as needed  -  Dvt prophylaxis:   SCDs  -  Daily PT/OT:   NWB Right Upper extremity, no External rotation past 30 degrees,  Ok to perform Cadmans and pendulums  - sling  - ice  - Follow up Labs   - Hospitalist following  -  Discharge planning: Home tomorrow

## 2023-08-17 NOTE — CARE COORDINATION ASSESSMENT. - NSPASTMEDSURGHISTORY_GEN_ALL_CORE_FT
PAST MEDICAL & SURGICAL HISTORY:  S/P carpal tunnel release  right/left      S/P lumpectomy, left breast  approx 2008      H/O hiatal hernia  surgery approx 2002      OA (osteoarthritis)      S/P ovarian cystectomy      S/P tonsillectomy      S/P appendectomy      S/p total knee replacement, bilateral  2017      Osteoarthritis of right shoulder      Seasonal allergies      Hypertension      Hypercholesteremia      GERD (gastroesophageal reflux disease)      Migraines      History of breast cancer      History of cataract surgery

## 2023-08-18 ENCOUNTER — TRANSCRIPTION ENCOUNTER (OUTPATIENT)
Age: 78
End: 2023-08-18

## 2023-08-18 VITALS
SYSTOLIC BLOOD PRESSURE: 154 MMHG | DIASTOLIC BLOOD PRESSURE: 81 MMHG | RESPIRATION RATE: 18 BRPM | OXYGEN SATURATION: 97 % | TEMPERATURE: 98 F | HEART RATE: 68 BPM

## 2023-08-18 LAB
ANION GAP SERPL CALC-SCNC: 7 MMOL/L — SIGNIFICANT CHANGE UP (ref 5–17)
BUN SERPL-MCNC: 15 MG/DL — SIGNIFICANT CHANGE UP (ref 7–23)
CALCIUM SERPL-MCNC: 9.4 MG/DL — SIGNIFICANT CHANGE UP (ref 8.4–10.5)
CHLORIDE SERPL-SCNC: 107 MMOL/L — SIGNIFICANT CHANGE UP (ref 96–108)
CO2 SERPL-SCNC: 22 MMOL/L — SIGNIFICANT CHANGE UP (ref 22–31)
CREAT SERPL-MCNC: 0.69 MG/DL — SIGNIFICANT CHANGE UP (ref 0.5–1.3)
EGFR: 89 ML/MIN/1.73M2 — SIGNIFICANT CHANGE UP
GLUCOSE SERPL-MCNC: 139 MG/DL — HIGH (ref 70–99)
HCT VFR BLD CALC: 36.5 % — SIGNIFICANT CHANGE UP (ref 34.5–45)
HGB BLD-MCNC: 11.9 G/DL — SIGNIFICANT CHANGE UP (ref 11.5–15.5)
MCHC RBC-ENTMCNC: 30.3 PG — SIGNIFICANT CHANGE UP (ref 27–34)
MCHC RBC-ENTMCNC: 32.6 GM/DL — SIGNIFICANT CHANGE UP (ref 32–36)
MCV RBC AUTO: 92.9 FL — SIGNIFICANT CHANGE UP (ref 80–100)
NRBC # BLD: 0 /100 WBCS — SIGNIFICANT CHANGE UP (ref 0–0)
PLATELET # BLD AUTO: 185 K/UL — SIGNIFICANT CHANGE UP (ref 150–400)
POTASSIUM SERPL-MCNC: 4.2 MMOL/L — SIGNIFICANT CHANGE UP (ref 3.5–5.3)
POTASSIUM SERPL-SCNC: 4.2 MMOL/L — SIGNIFICANT CHANGE UP (ref 3.5–5.3)
RBC # BLD: 3.93 M/UL — SIGNIFICANT CHANGE UP (ref 3.8–5.2)
RBC # FLD: 13 % — SIGNIFICANT CHANGE UP (ref 10.3–14.5)
SODIUM SERPL-SCNC: 136 MMOL/L — SIGNIFICANT CHANGE UP (ref 135–145)
WBC # BLD: 9.74 K/UL — SIGNIFICANT CHANGE UP (ref 3.8–10.5)
WBC # FLD AUTO: 9.74 K/UL — SIGNIFICANT CHANGE UP (ref 3.8–10.5)

## 2023-08-18 PROCEDURE — 99232 SBSQ HOSP IP/OBS MODERATE 35: CPT

## 2023-08-18 RX ORDER — OXYCODONE HYDROCHLORIDE 5 MG/1
1 TABLET ORAL
Qty: 42 | Refills: 0
Start: 2023-08-18 | End: 2023-08-24

## 2023-08-18 RX ORDER — HYDROMORPHONE HYDROCHLORIDE 2 MG/ML
4 INJECTION INTRAMUSCULAR; INTRAVENOUS; SUBCUTANEOUS
Refills: 0 | Status: DISCONTINUED | OUTPATIENT
Start: 2023-08-18 | End: 2023-08-18

## 2023-08-18 RX ORDER — CELECOXIB 200 MG/1
1 CAPSULE ORAL
Qty: 60 | Refills: 0
Start: 2023-08-18 | End: 2023-09-16

## 2023-08-18 RX ORDER — HYDROMORPHONE HYDROCHLORIDE 2 MG/ML
2 INJECTION INTRAMUSCULAR; INTRAVENOUS; SUBCUTANEOUS
Refills: 0 | Status: DISCONTINUED | OUTPATIENT
Start: 2023-08-18 | End: 2023-08-18

## 2023-08-18 RX ORDER — SENNA PLUS 8.6 MG/1
2 TABLET ORAL
Qty: 0 | Refills: 0 | DISCHARGE
Start: 2023-08-18

## 2023-08-18 RX ORDER — ASPIRIN/CALCIUM CARB/MAGNESIUM 324 MG
1 TABLET ORAL
Qty: 0 | Refills: 0 | DISCHARGE
Start: 2023-08-18

## 2023-08-18 RX ORDER — HYDROMORPHONE HYDROCHLORIDE 2 MG/ML
1 INJECTION INTRAMUSCULAR; INTRAVENOUS; SUBCUTANEOUS
Qty: 42 | Refills: 0
Start: 2023-08-18 | End: 2023-08-24

## 2023-08-18 RX ORDER — ACETAMINOPHEN 500 MG
2 TABLET ORAL
Qty: 0 | Refills: 0 | DISCHARGE
Start: 2023-08-18

## 2023-08-18 RX ORDER — ASPIRIN/CALCIUM CARB/MAGNESIUM 324 MG
1 TABLET ORAL
Refills: 0 | DISCHARGE

## 2023-08-18 RX ADMIN — Medication 1000 MILLIGRAM(S): at 05:24

## 2023-08-18 RX ADMIN — Medication 101.6 MILLIGRAM(S): at 05:15

## 2023-08-18 RX ADMIN — CELECOXIB 200 MILLIGRAM(S): 200 CAPSULE ORAL at 08:44

## 2023-08-18 RX ADMIN — HYDROMORPHONE HYDROCHLORIDE 2 MILLIGRAM(S): 2 INJECTION INTRAMUSCULAR; INTRAVENOUS; SUBCUTANEOUS at 08:44

## 2023-08-18 RX ADMIN — CELECOXIB 200 MILLIGRAM(S): 200 CAPSULE ORAL at 08:45

## 2023-08-18 RX ADMIN — Medication 1000 MILLIGRAM(S): at 05:16

## 2023-08-18 RX ADMIN — SODIUM CHLORIDE 125 MILLILITER(S): 9 INJECTION, SOLUTION INTRAVENOUS at 05:15

## 2023-08-18 RX ADMIN — HYDROMORPHONE HYDROCHLORIDE 2 MILLIGRAM(S): 2 INJECTION INTRAMUSCULAR; INTRAVENOUS; SUBCUTANEOUS at 09:30

## 2023-08-18 RX ADMIN — OXYCODONE HYDROCHLORIDE 5 MILLIGRAM(S): 5 TABLET ORAL at 04:30

## 2023-08-18 RX ADMIN — PANTOPRAZOLE SODIUM 40 MILLIGRAM(S): 20 TABLET, DELAYED RELEASE ORAL at 05:16

## 2023-08-18 RX ADMIN — Medication 81 MILLIGRAM(S): at 05:16

## 2023-08-18 RX ADMIN — OXYCODONE HYDROCHLORIDE 5 MILLIGRAM(S): 5 TABLET ORAL at 03:58

## 2023-08-18 RX ADMIN — ATENOLOL 50 MILLIGRAM(S): 25 TABLET ORAL at 05:19

## 2023-08-18 RX ADMIN — Medication 1000 MILLIGRAM(S): at 13:42

## 2023-08-18 RX ADMIN — Medication 75 MILLIGRAM(S): at 05:16

## 2023-08-18 RX ADMIN — OXYCODONE HYDROCHLORIDE 5 MILLIGRAM(S): 5 TABLET ORAL at 00:15

## 2023-08-18 RX ADMIN — Medication 1000 MILLIGRAM(S): at 13:52

## 2023-08-18 NOTE — DISCHARGE NOTE NURSING/CASE MANAGEMENT/SOCIAL WORK - NSDCPEFALRISK_GEN_ALL_CORE
For information on Fall & Injury Prevention, visit: [email or web address] OR  [email or web address] OR  https://www.cdc.gov/steadi/patient.html

## 2023-08-18 NOTE — PROGRESS NOTE ADULT - SUBJECTIVE AND OBJECTIVE BOX
ORTHOPEDIC PA PROGRESS NOTE  ALIYA MCNEILL      78y Female                                                                                                                               POD # 1    STATUS POST:               Pre-Op Dx: DJD of shoulder      Post-Op Dx:  DJD of shoulder      Procedure: Total shoulder arthroplasty                                                  Pain (0-10): 8  Current Pain Management:  [ ] PCA   [x ] Po Analgesics [ ] IM /IV Anagesics     T(F): 98  HR: 68  BP: 154/81  RR: 18  SpO2: 97%                        11.9   9.74  )-----------( 185      ( 18 Aug 2023 06:30 )             36.5                     08-18    136  |  107  |  15  ----------------------------<  139<H>  4.2   |  22  |  0.69    Ca    9.4      18 Aug 2023 06:30      Physical Exam :    -   Dressing sterile.    -  (+)  strength 5/5  -   Distal Neurvascular status intact grossly.   -   Warm well perfused; capillary refill <3 seconds   -   (+) Sensation to light touch  -   (-) Calf tenderness Bilaterally    A/P: 78y Female s/p DJD of shoulder  -   Ortho Stable  -   Pain control   -   Medicine to follow  -   DVT ppx:     [x ]SCDs     [ ] ASA     [ ] Eliquis     [ ] Lovenox  -   Weight bearing status:  WBAT [ ]        PWB    [ ]     TTWB  [ ]      NWB  [x]   -  Dispo:     Home [x ]     Acute Rehab [ ]     [facility] [ ]     TBD [ ]                                                                                ORTHOPEDIC PA PROGRESS NOTE  ALIYA MCNEILL      78y Female                                                                                                                               POD # 1    STATUS POST:               Pre-Op Dx: DJD of shoulder      Post-Op Dx:  DJD of shoulder      Procedure: Total shoulder arthroplasty                                                  Pain (0-10): 8  Current Pain Management:  [ ] PCA   [x ] Po Analgesics [ ] IM /IV Anagesics     T(F): 98  HR: 68  BP: 154/81  RR: 18  SpO2: 97%                        11.9   9.74  )-----------( 185      ( 18 Aug 2023 06:30 )             36.5                     08-18    136  |  107  |  15  ----------------------------<  139<H>  4.2   |  22  |  0.69    Ca    9.4      18 Aug 2023 06:30      Physical Exam :    -   Dressing sterile.    -  (+)  strength 5/5  -   Distal Neurvascular status intact grossly.   -   Warm well perfused; capillary refill <3 seconds   -   (+) Sensation to light touch  -   (-) Calf tenderness Bilaterally    A/P: 78y Female s/p DJD of shoulder  -   Ortho Stable  -   Pain control- patient complaining that oxycodone is not relieving her pain.  Dilaudid ordered, will hold prescription until patient tries dilaudid this morning  -   Medicine to follow  -   DVT ppx:     [x ]SCDs     [ ] ASA     [ ] Eliquis     [ ] Lovenox  -   Weight bearing status:  WBAT [ ]        PWB    [ ]     TTWB  [ ]      NWB  [x]   -  Dispo:     Home [x ]     Acute Rehab [ ]     [facility] [ ]     TBD [ ]

## 2023-08-18 NOTE — PROGRESS NOTE ADULT - ASSESSMENT
78 year-old female with OA shoulder.    1. OA right shoulder s/p TSR:  Opiates prn + bowel regimen.   Physical Therapy evaluation.  DVT prophylaxis as per Orthopedic protocol.   Tylenol q8    2. HTN:  Blood pressure meds with hold parameters     3. Asthma:  Nebs prn    4. GERD: PPI    5. Dispo: home pending PT/OT eval.

## 2023-08-18 NOTE — DISCHARGE NOTE NURSING/CASE MANAGEMENT/SOCIAL WORK - NSSCTYPOFSERV_GEN_ALL_CORE
[facility]/ Home Care Services with [facility] ( / [phone] )  Home Care RN will call within 24/48 hrs of DC from the hospital to set up Initial Assessment.

## 2023-08-18 NOTE — DISCHARGE NOTE NURSING/CASE MANAGEMENT/SOCIAL WORK - PATIENT PORTAL LINK FT
You can access the FollowMyHealth Patient Portal offered by [facility] by registering at the following website: [email or web address]. By joining [employer]’s FollowMyHealth portal, you will also be able to view your health information using other applications (apps) compatible with our system.

## 2023-08-18 NOTE — DISCHARGE NOTE NURSING/CASE MANAGEMENT/SOCIAL WORK - NSDCVIVACCINE_GEN_ALL_CORE_FT
Tdap; 04-Aug-2023 12:13; Sherine Sosa (RN); [employer]; U (Exp. Date: 04-Aug-2023); IntraMuscular; Deltoid Right.; 0.5 milliLiter(s); VIS (VIS Published: 09-May-2013, VIS Presented: 04-Aug-2023);

## 2023-08-18 NOTE — CASE MANAGEMENT PROGRESS NOTE - NSCMPROGRESSNOTE_GEN_ALL_CORE
FINAL DISCHARGE NOTE:   Patient's case was declined by pt's first hc choice; referral was sent to pt's other choices, patient was accepted by [facility] [place]; [phone]; Patient chose [facility] [place];  S  instructed to call patient and or son, Clement [phone] to arrange Initial Eval visit;  Clement was furnished by this [facility] Main number [phone];  ASantaromana RN  FINAL DISCHARGE NOTE:   Patient's case was declined by pt's first hc choice; referral was sent to pt's other choices, patient was accepted by [facility] [place]; [phone]; Patient chose [facility] [place];  [facility]  instructed to call patient and or son, Clement [phone] to arrange Initial Eval visit;  [facility] [phone]; Transition of Care Plan: DC to home with [facility];  to f/u with MD post DC; Family to transport patient home;   ASantaromana RN CM

## 2023-08-18 NOTE — PROGRESS NOTE ADULT - ASSESSMENT
Neurovascular status in tact Neurovascular status in tact.  Home today when cleared/pain managed appropriately

## 2023-08-18 NOTE — PROGRESS NOTE ADULT - SUBJECTIVE AND OBJECTIVE BOX
Patient is a 78y old  Female who presents with a chief complaint of Right reverse total shoulder replacement (18 Aug 2023 08:18)      INTERVAL History of Present Illness/OVERNIGHT EVENTS: some pain overnight, but currently tolerable    MEDICATIONS  (STANDING):  acetaminophen     Tablet .. 1000 milliGRAM(s) Oral every 8 hours  aspirin enteric coated 81 milliGRAM(s) Oral daily  atenolol  Tablet 50 milliGRAM(s) Oral two times a day  atorvastatin 40 milliGRAM(s) Oral at bedtime  budesonide 160 MICROgram(s)/formoterol 4.5 MICROgram(s) Inhaler 2 Puff(s) Inhalation two times a day  celecoxib 200 milliGRAM(s) Oral every 12 hours  lactated ringers. 1000 milliLiter(s) (125 mL/Hr) IV Continuous <Continuous>  montelukast 10 milliGRAM(s) Oral daily  pantoprazole    Tablet 40 milliGRAM(s) Oral before breakfast  polyethylene glycol 3350 17 Gram(s) Oral at bedtime  senna 2 Tablet(s) Oral at bedtime  topiramate 75 milliGRAM(s) Oral two times a day    MEDICATIONS  (PRN):  albuterol    90 MICROgram(s) HFA Inhaler 2 Puff(s) Inhalation every 6 hours PRN Shortness of Breath and/or Wheezing  aluminum hydroxide/magnesium hydroxide/simethicone Suspension 30 milliLiter(s) Oral four times a day PRN Indigestion  fentaNYL    Injectable 25 MICROGram(s) IV Push every 5 minutes PRN Moderate Pain (4 - 6)  fentaNYL    Injectable 50 MICROGram(s) IV Push every 15 minutes PRN Severe Pain (7 - 10)  HYDROmorphone   Tablet 2 milliGRAM(s) Oral every 3 hours PRN Moderate Pain (4 - 6)  HYDROmorphone   Tablet 4 milliGRAM(s) Oral every 3 hours PRN Severe Pain (7 - 10)  HYDROmorphone  Injectable 0.5 milliGRAM(s) IV Push every 3 hours PRN Breakthrough pain  magnesium hydroxide Suspension 30 milliLiter(s) Oral daily PRN Constipation  ondansetron Injectable 4 milliGRAM(s) IV Push every 6 hours PRN Nausea and/or Vomiting      Allergies    codeine (Hives; Rash)    Intolerances    Demerol HCl (Nausea; Vomiting)      REVIEW OF SYSTEMS:  Other systems currently negative unless otherwise specified above.    Vital Signs Last 24 Hrs  T(C): 36.7 (18 Aug 2023 08:02), Max: 36.7 (17 Aug 2023 11:40)  T(F): 98 (18 Aug 2023 08:02), Max: 98.1 (17 Aug 2023 20:14)  HR: 68 (18 Aug 2023 08:02) (60 - 81)  BP: 154/81 (18 Aug 2023 08:02) (126/76 - 160/78)  BP(mean): --  RR: 18 (18 Aug 2023 08:02) (16 - 20)  SpO2: 97% (18 Aug 2023 08:02) (95% - 100%)    Parameters below as of 18 Aug 2023 08:02  Patient On (Oxygen Delivery Method): room air            PHYSICAL EXAM:  GENERAL: No apparent distress, appears stated age  EYES: Conjunctiva and sclera clear, no discharge  ENMT: Moist mucous membranes, no nasal or ear discharge  NECK: Supple, no JVD  CHEST/LUNG: Clear to auscultation; no rales, wheeze or rubs  HEART: Regular rhythm, no rubs or gallops  ABDOMEN: Soft, Nontender, Nondistended  EXTREMITIES: RUE sling, TSR       LABS:                        11.9   9.74  )-----------( 185      ( 18 Aug 2023 06:30 )             36.5     18 Aug 2023 06:30    136    |  107    |  15     ----------------------------<  139    4.2     |  22     |  0.69     Ca    9.4        18 Aug 2023 06:30        Urinalysis Basic - ( 18 Aug 2023 06:30 )    Color: x / Appearance: x / SG: x / pH: x  Gluc: 139 mg/dL / Ketone: x  / Bili: x / Urobili: x   Blood: x / Protein: x / Nitrite: x   Leuk Esterase: x / RBC: x / WBC x   Sq Epi: x / Non Sq Epi: x / Bacteria: x      CAPILLARY BLOOD GLUCOSE            RADIOLOGY & ADDITIONAL TESTS:      Images reviewed personally    Consultant Notes Reviewed and Care Discussed with relevant Consultants.

## 2023-08-29 ENCOUNTER — APPOINTMENT (OUTPATIENT)
Dept: ORTHOPEDIC SURGERY | Facility: CLINIC | Age: 78
End: 2023-08-29
Payer: MEDICARE

## 2023-08-29 VITALS — HEART RATE: 70 BPM | SYSTOLIC BLOOD PRESSURE: 118 MMHG | DIASTOLIC BLOOD PRESSURE: 77 MMHG

## 2023-08-29 DIAGNOSIS — Z96.611 PRESENCE OF RIGHT ARTIFICIAL SHOULDER JOINT: ICD-10-CM

## 2023-08-29 DIAGNOSIS — Z96.612 PRESENCE OF LEFT ARTIFICIAL SHOULDER JOINT: ICD-10-CM

## 2023-08-29 DIAGNOSIS — Z98.890 OTHER SPECIFIED POSTPROCEDURAL STATES: ICD-10-CM

## 2023-08-29 PROCEDURE — 73030 X-RAY EXAM OF SHOULDER: CPT | Mod: RT

## 2023-08-29 PROCEDURE — 99024 POSTOP FOLLOW-UP VISIT: CPT

## 2023-08-29 NOTE — HISTORY OF PRESENT ILLNESS
[___ Weeks Post Op] : [unfilled] weeks post op [de-identified] : S/P Right TSR     [de-identified] : This patient presents for the first postop visit status post right reverse total shoulder replacement performed on 17 August.  She notes pain level 6 out of 10.  Pain at nighttime is worse with she can increase to 10 out of 10.  She denies any drainage from the incision.  Denies any warmth erythema.  Denies fever or chills.     She requests a new prescription to continue home therapy.  She is taking hydromorphone for pain.She did start therapy at home but would like a prescription to continue physical  [de-identified] : Exam of the right shoulder reveals the incision is healing well.  The Dermabond tape is intact.  There is no warmth erythema.  There is mild ecchymosis.  There is no significant soft tissue swelling.  There is no drainage.  Her range of motion shows 100 degrees of active assisted forward flexion with some discomfort.  Strength not tested secondary to the procedure.  Pulses are intact distally.  Capillary refill is normal. There is no edema or lymphadenopathy. [de-identified] : AP, outlet, and glenoid views of the right shoulder  were obtained.  The patient is status post reverese total shoulder replacement.  The components are well fixed with good alignment.  No evidence of loosening or periprosthetic fracture is noted. [de-identified] : This patient presents for the first postoperative visit status post right reverse total shoulder replacement.  Patient is going to continue home therapy.  I gave her prescription for therapy.  We will renew her hydromorphone to the pharmacy.  Instructions were given regarding taking the hydromorphone.  I like to see her back in 4 weeks for follow-up and reevaluation.  She was given instructions on activity restrictions and as well to remove the Dermabond tape at the end of this week.

## 2023-08-30 RX ORDER — HYDROMORPHONE HYDROCHLORIDE 2 MG/1
2 TABLET ORAL
Qty: 28 | Refills: 0 | Status: ACTIVE | COMMUNITY
Start: 2023-08-30 | End: 1900-01-01

## 2023-09-26 ENCOUNTER — APPOINTMENT (OUTPATIENT)
Dept: ORTHOPEDIC SURGERY | Facility: CLINIC | Age: 78
End: 2023-09-26
Payer: MEDICARE

## 2023-09-26 VITALS — SYSTOLIC BLOOD PRESSURE: 126 MMHG | DIASTOLIC BLOOD PRESSURE: 76 MMHG | HEART RATE: 78 BPM

## 2023-09-26 DIAGNOSIS — M25.519 PAIN IN UNSPECIFIED SHOULDER: ICD-10-CM

## 2023-09-26 PROCEDURE — 73030 X-RAY EXAM OF SHOULDER: CPT | Mod: RT

## 2023-09-26 PROCEDURE — 99024 POSTOP FOLLOW-UP VISIT: CPT

## 2023-11-07 ENCOUNTER — APPOINTMENT (OUTPATIENT)
Dept: ORTHOPEDIC SURGERY | Facility: CLINIC | Age: 78
End: 2023-11-07
Payer: MEDICARE

## 2023-11-07 VITALS — HEART RATE: 55 BPM | SYSTOLIC BLOOD PRESSURE: 144 MMHG | DIASTOLIC BLOOD PRESSURE: 83 MMHG

## 2023-11-07 PROCEDURE — 73030 X-RAY EXAM OF SHOULDER: CPT | Mod: RT

## 2023-11-07 PROCEDURE — 99024 POSTOP FOLLOW-UP VISIT: CPT

## 2023-11-13 PROCEDURE — C1776: CPT

## 2023-11-13 PROCEDURE — 97535 SELF CARE MNGMENT TRAINING: CPT

## 2023-11-13 PROCEDURE — 97165 OT EVAL LOW COMPLEX 30 MIN: CPT

## 2023-11-13 PROCEDURE — 73030 X-RAY EXAM OF SHOULDER: CPT

## 2023-11-13 PROCEDURE — 36415 COLL VENOUS BLD VENIPUNCTURE: CPT

## 2023-11-13 PROCEDURE — 97116 GAIT TRAINING THERAPY: CPT

## 2023-11-13 PROCEDURE — 80048 BASIC METABOLIC PNL TOTAL CA: CPT

## 2023-11-13 PROCEDURE — 97161 PT EVAL LOW COMPLEX 20 MIN: CPT

## 2023-11-13 PROCEDURE — 85027 COMPLETE CBC AUTOMATED: CPT

## 2023-11-13 PROCEDURE — 94664 DEMO&/EVAL PT USE INHALER: CPT

## 2023-11-13 PROCEDURE — 94640 AIRWAY INHALATION TREATMENT: CPT

## 2023-11-13 PROCEDURE — 97530 THERAPEUTIC ACTIVITIES: CPT

## 2023-11-13 PROCEDURE — C1713: CPT

## 2023-12-19 ENCOUNTER — APPOINTMENT (OUTPATIENT)
Dept: ORTHOPEDIC SURGERY | Facility: CLINIC | Age: 78
End: 2023-12-19

## 2023-12-29 NOTE — PATIENT PROFILE ADULT. - COMFORT LEVEL, ACCEPTABLE
Medication(s) to Refill:   Requested Prescriptions     Pending Prescriptions Disp Refills    METFORMIN  MG Oral Tablet 24 Hr [Pharmacy Med Name: Metformin Hydrochloride Er 24hr 750 Mg Tab Asce] 60 tablet 0     Sig: Take 1 tablet by mouth two times daily with meals. AMLODIPINE 5 MG Oral Tab [Pharmacy Med Name: Amlodipine Besylate 5 Mg Tab Unic] 30 tablet 0     Sig: Take 1 tablet (5 mg total) by mouth daily. Reason for Medication Refill being sent to Provider / Reason Protocol Failed:  [] 90 day refill has already been granted  [] Blood Pressure out of range  [] Labs Abnormal/over due  [] Medication not previously prescribed by Provider  [] Non-Protocol Medication  [] Controlled Substance   [] Due for appointment- no future appointment scheduled  [] No Follow up specified      Last Time Medication was Filled:  11/20/23      Last Office Visit with PCP: 11/30/23    When Patient was Due Back to the Office:    (from when PCP last addressed condition)    Future Appointments:  No future appointments.       Last Blood Pressures:  BP Readings from Last 2 Encounters:   11/30/23 136/82   04/15/23 120/80         Action taken:  [] Refill approved per protocol  [] Routing to provider for approval 4

## 2024-01-10 ENCOUNTER — APPOINTMENT (OUTPATIENT)
Dept: ORTHOPEDIC SURGERY | Facility: CLINIC | Age: 79
End: 2024-01-10

## 2024-01-24 ENCOUNTER — APPOINTMENT (OUTPATIENT)
Dept: ORTHOPEDIC SURGERY | Facility: CLINIC | Age: 79
End: 2024-01-24
Payer: MEDICARE

## 2024-01-24 VITALS — HEART RATE: 58 BPM | DIASTOLIC BLOOD PRESSURE: 77 MMHG | SYSTOLIC BLOOD PRESSURE: 133 MMHG

## 2024-01-24 PROCEDURE — 99213 OFFICE O/P EST LOW 20 MIN: CPT

## 2024-01-24 PROCEDURE — 73030 X-RAY EXAM OF SHOULDER: CPT | Mod: RT

## 2024-01-24 NOTE — DISCUSSION/SUMMARY
[de-identified] : The patient presents today for follow-up status post right reverse total shoulder.  She doing very well except she complains that she cannot do the hair in the back of her head.  I explained to her that her strength should continue to improve over the next 6 to 9 months.  Hopefully she will get some improved elevation of the right shoulder and we will do her hair.  She finished a formal course of therapy.  I recommended continuing some home exercises and I will see her back in August for her 1 year checkup and x-rays.  At least 20 minutes was spent performing the evaluation and management on today's office visit.  This includes but is not limited to preparing to see patient including review of any test results or outside medical records, obtaining and/or reviewing separately obtained history, performing examination and evaluation, counseling and educating the patient on their diagnosis and treatment recommendations, ordering medications, tests, or procedures, documenting clinical information in the electronic health record, independently interpreting results (not separately reported) and communicating results to the patient, and coordination of care.

## 2024-01-24 NOTE — HISTORY OF PRESENT ILLNESS
[de-identified] : This patient presents for follow-up regarding right reverse total shoulder placement performed August of last year.  Patient notes discomfort intermittently which is 4 out of 10.  She takes naproxen intermittently.  She finished a course formal course of therapy.  She is having problems reaching overhead and doing the hair behind her head.  Otherwise she is doing all activities of daily living without restriction.

## 2024-01-24 NOTE — REASON FOR VISIT
[Follow-Up Visit] : a follow-up visit for [Family Member] : family member [FreeTextEntry2] : s/p right reverse total shoulder replacement 8/17/23.

## 2024-01-24 NOTE — PHYSICAL EXAM
[de-identified] : Exam of the right shoulder reveals incisions well-healed.  There is no warmth or erythema.  There is no ecchymosis.  There is no soft tissue swelling.  Her range of motion shows 120 degrees of active forward flexion, passive forward flexion to 140 degrees, external rotation 30 degrees, abduction 80 degrees, internal rotation to the lumbosacral junction.  There is weakness of the rotator cuff secondary to rotator cuff insufficiency.  Pulses are intact distally.  Capillary refill is normal. There is no edema or lymphadenopathy.  Strength and sensation are intact distally. [de-identified] : AP, outlet, and glenoid views of the right shoulder  were obtained.  The patient is status post reverese total shoulder replacement.  The components are well fixed with good alignment.  No evidence of loosening or periprosthetic fracture is noted.

## 2024-01-24 NOTE — REVIEW OF SYSTEMS
[Joint Pain] : joint pain [Joint Stiffness] : joint stiffness [Negative] : Heme/Lymph [Joint Swelling] : no joint swelling ,DirectAddress_Unknown

## 2024-03-06 NOTE — H&P PST ADULT - ALLERGY TYPES
Based on patient insurance [employer], the automated system stated that cpt code 88110 does not require authorization per members plan. Confirmation number: [id number]  
outdoor environmental allergies/reactions to medicines

## 2024-06-10 ENCOUNTER — APPOINTMENT (OUTPATIENT)
Dept: ORTHOPEDIC SURGERY | Facility: CLINIC | Age: 79
End: 2024-06-10
Payer: MEDICARE

## 2024-06-10 VITALS — HEART RATE: 61 BPM | SYSTOLIC BLOOD PRESSURE: 126 MMHG | DIASTOLIC BLOOD PRESSURE: 75 MMHG

## 2024-06-10 DIAGNOSIS — Z96.611 PRESENCE OF RIGHT ARTIFICIAL SHOULDER JOINT: ICD-10-CM

## 2024-06-10 DIAGNOSIS — M75.42 IMPINGEMENT SYNDROME OF LEFT SHOULDER: ICD-10-CM

## 2024-06-10 PROCEDURE — 99214 OFFICE O/P EST MOD 30 MIN: CPT

## 2024-06-10 PROCEDURE — 73030 X-RAY EXAM OF SHOULDER: CPT | Mod: 50

## 2024-06-10 NOTE — HISTORY OF PRESENT ILLNESS
[de-identified] : This patient presents with complaints of worsening left shoulder pain.  She notes pain with activity such as reaching pulling pushing and lifting.  The pain level varies from 6-8 out of 10.  She takes naproxen for the pain which seems to help.  She also is complaining of some pain in the right shoulder.  Status post right reverse total shoulder placement in August of last year.  She has discomfort with extremes of motion and has problems putting her arm behind her back and behind her head to do her hair.  She notes decreased range of motion as well.

## 2024-06-10 NOTE — REASON FOR VISIT
[Follow-Up Visit] : a follow-up visit for [Shoulder Pain] : shoulder pain [FreeTextEntry2] : S/p reverse right total shoulder replacement 8/17/23.

## 2024-06-10 NOTE — DISCUSSION/SUMMARY
[de-identified] : This patient presents for evaluation regarding left shoulder pain which has been bothering her recently.  She is also having some pain in the right shoulder with activity and notes stiffness in the right shoulder after her reverse shoulder replacement.  Her left shoulder Veals evidence of mild impingement.  I recommend a course of meloxicam for 2 weeks.  Instructions were given regarding taking meloxicam.  In regards to her right shoulder, I explained to her that she has no rotator cuff and she has reverse replacement so reaching behind her back may be difficult.  If the symptoms in the left shoulder do not improve with the meloxicam I would see him back in the office at that time for reevaluation and possible cortisone injection.  At least 30 minutes was spent performing the evaluation and management on today's office visit.  This includes but is not limited to preparing to see patient including review of any test results or outside medical records, obtaining and/or reviewing separately obtained history, performing examination and evaluation, counseling and educating the patient on their diagnosis and treatment recommendations, ordering medications, tests, or procedures, documenting clinical information in the electronic health record, independently interpreting results (not separately reported) and communicating results to the patient, and coordination of care.

## 2024-06-10 NOTE — PHYSICAL EXAM
[de-identified] : The patient appears well nourished  and in no apparent distress.  The patient is alert and oriented to person, place, and time.   Affect and mood appear normal.    The head is normocephalic and atraumatic.  The eyes reveal normal sclera and extra ocular muscles are intact.   The neck appears normal with no jugular venous distention or masses noted.   Skin shows normal turgor with no evidence of eczema or psoriasis.  No respiratory distress noted.  The patient ambulates with a normal gait.  Exam of the right shoulder reveals incisions well-healed.  There is no warmth or erythema.  There is no ecchymosis.  There is no soft tissue swelling.  Her range of motion shows 130 degrees of active forward flexion, passive forward flexion to 160 degrees, external rotation 40 degrees, abduction 80 degrees, internal rotation to the lumbosacral junction.  There is weakness of the rotator cuff secondary to rotator cuff insufficiency.  Pulses are intact distally.  Capillary refill is normal. There is no edema or lymphadenopathy.  Strength and sensation are intact distally.  The left shoulder has full range of motion.  There is mild discomfort with terminal range of motion.  There is no tenderness to palpation.  There is a negative impingement sign.  There is a negative Bhatt sign.   Rotator cuff strength is normal.    There is no soft tissue swelling.  There is no eccyhmosis.  There is no warmth or erythema.    There is no instabililty on exam.  No lymphadenopathy or edema is noted.  Pulses and capillary refill are normal.  There is no muscular atrophy.  Strength and sensation are intact distally.   [de-identified] : AP, outlet,  and glenoid and axillary views of the left shoulder were obtained.  The glenohumeral and acromioclavicular joints are well maintained without evidence of degenerative arthritis.   There is no fracture, dislocation, or subluxation.    AP, outlet, and glenoid views of the right shoulder  were obtained.  The patient is status post reverese total shoulder replacement.  The components are well fixed with good alignment.  No evidence of loosening or periprosthetic fracture is noted.

## 2024-06-12 RX ORDER — MELOXICAM 15 MG/1
15 TABLET ORAL
Qty: 21 | Refills: 1 | Status: ACTIVE | COMMUNITY
Start: 2024-06-12 | End: 1900-01-01

## 2024-08-22 ENCOUNTER — RX RENEWAL (OUTPATIENT)
Age: 79
End: 2024-08-22

## 2024-10-23 ENCOUNTER — APPOINTMENT (OUTPATIENT)
Dept: ORTHOPEDIC SURGERY | Facility: CLINIC | Age: 79
End: 2024-10-23

## 2024-10-23 PROCEDURE — 99203 OFFICE O/P NEW LOW 30 MIN: CPT

## 2024-10-23 PROCEDURE — 73030 X-RAY EXAM OF SHOULDER: CPT | Mod: RT

## 2024-10-23 RX ORDER — MONTELUKAST 10 MG/1
TABLET, FILM COATED ORAL
Refills: 0 | Status: ACTIVE | COMMUNITY

## 2024-10-23 RX ORDER — TOPIRAMATE 50 MG/1
TABLET, FILM COATED ORAL
Refills: 0 | Status: ACTIVE | COMMUNITY

## 2025-01-21 ENCOUNTER — APPOINTMENT (OUTPATIENT)
Dept: ORTHOPEDIC SURGERY | Facility: CLINIC | Age: 80
End: 2025-01-21

## 2025-01-27 ENCOUNTER — APPOINTMENT (OUTPATIENT)
Dept: ORTHOPEDIC SURGERY | Facility: CLINIC | Age: 80
End: 2025-01-27
Payer: MEDICARE

## 2025-01-27 DIAGNOSIS — Z96.611 PRESENCE OF RIGHT ARTIFICIAL SHOULDER JOINT: ICD-10-CM

## 2025-01-27 DIAGNOSIS — M25.551 PAIN IN RIGHT HIP: ICD-10-CM

## 2025-01-27 PROCEDURE — 73030 X-RAY EXAM OF SHOULDER: CPT | Mod: RT

## 2025-01-27 PROCEDURE — 99214 OFFICE O/P EST MOD 30 MIN: CPT

## 2025-01-27 PROCEDURE — 73502 X-RAY EXAM HIP UNI 2-3 VIEWS: CPT | Mod: RT

## 2025-01-27 RX ORDER — MELOXICAM 15 MG/1
15 TABLET ORAL DAILY
Qty: 30 | Refills: 0 | Status: ACTIVE | COMMUNITY
Start: 2025-01-27 | End: 1900-01-01

## 2025-02-04 ENCOUNTER — APPOINTMENT (OUTPATIENT)
Dept: DERMATOLOGY | Facility: CLINIC | Age: 80
End: 2025-02-04

## 2025-02-19 ENCOUNTER — APPOINTMENT (OUTPATIENT)
Dept: ORTHOPEDIC SURGERY | Facility: CLINIC | Age: 80
End: 2025-02-19

## 2025-03-20 ENCOUNTER — APPOINTMENT (OUTPATIENT)
Dept: DERMATOLOGY | Facility: CLINIC | Age: 80
End: 2025-03-20
Payer: MEDICARE

## 2025-03-20 DIAGNOSIS — L60.3 NAIL DYSTROPHY: ICD-10-CM

## 2025-03-20 DIAGNOSIS — B35.3 TINEA PEDIS: ICD-10-CM

## 2025-03-20 PROCEDURE — 99203 OFFICE O/P NEW LOW 30 MIN: CPT

## 2025-03-20 RX ORDER — KETOCONAZOLE 20 MG/G
2 CREAM TOPICAL
Qty: 1 | Refills: 3 | Status: ACTIVE | COMMUNITY
Start: 2025-03-20 | End: 1900-01-01

## (undated) DEVICE — GLV 7.5 PROTEXIS (WHITE)

## (undated) DEVICE — SOL IRR POUR H2O 1500ML

## (undated) DEVICE — SUT POLYSORB 2-0 30" GS-21 UNDYED

## (undated) DEVICE — POSITIONER STIRRUP STRAP W SLIP RING 19X3.5"

## (undated) DEVICE — CANISTER SUCTION LID GUARD 3000CC

## (undated) DEVICE — NDL HYPO SAFE 22G X 1.5" (BLACK)

## (undated) DEVICE — SUT ETHIBOND 2 30" V37

## (undated) DEVICE — DRAPE INSTRUMENT POUCH 6.75" X 11"

## (undated) DEVICE — SOL IRR POUR NS 0.9% 500ML

## (undated) DEVICE — PACK TOTAL HIP

## (undated) DEVICE — ELCTR AQUAMANTYS BIPOLAR SEALER 6.0

## (undated) DEVICE — SOLIDIFIER CANN EXPRESS 3K

## (undated) DEVICE — SPECIMEN CONTAINER PET

## (undated) DEVICE — WOUND IRR IRRISEPT W 0.5 CHG

## (undated) DEVICE — SAW BLADE STRYKER SAGITTAL AGGRESSIVE 25X86.5X1.32MM

## (undated) DEVICE — SOL IRR BAG NS 0.9% 3000ML

## (undated) DEVICE — CANISTER SUCTION LINER 1500 CC

## (undated) DEVICE — HANDPIECE INTERPULSE W MULTI TIP

## (undated) DEVICE — DRILL BIT BIOMET 2.7MM

## (undated) DEVICE — DRSG TAPE MICROFOAM 3"

## (undated) DEVICE — SYR LUER LOK 20CC

## (undated) DEVICE — DRSG DERMABOND PRINEO 60CM

## (undated) DEVICE — DRILL BIT BIOMET 3.2MM

## (undated) DEVICE — SUT POLYSORB 1 36" GS-21 UNDYED

## (undated) DEVICE — POSITIONER FOAM HEAD CRADLE (PINK)

## (undated) DEVICE — VENODYNE/SCD SLEEVE CALF MEDIUM

## (undated) DEVICE — SPECIMEN CONTAINER 100ML

## (undated) DEVICE — GLV 8 PROTEXIS (WHITE)

## (undated) DEVICE — DRILL BIT MICROAIRE TWIST 2X127MM

## (undated) DEVICE — HOOD FLYTE STRYKER HELMET SHIELD

## (undated) DEVICE — PREP CHLORAPREP HI-LITE ORANGE 26ML

## (undated) DEVICE — Device

## (undated) DEVICE — WARMING BLANKET LOWER ADULT

## (undated) DEVICE — SUT POLYSORB 0 30" GS-21 UNDYED